# Patient Record
Sex: MALE | Race: BLACK OR AFRICAN AMERICAN | Employment: UNEMPLOYED
[De-identification: names, ages, dates, MRNs, and addresses within clinical notes are randomized per-mention and may not be internally consistent; named-entity substitution may affect disease eponyms.]

---

## 2017-01-04 ENCOUNTER — OFFICE VISIT (OUTPATIENT)
Dept: PODIATRY | Facility: CLINIC | Age: 67
End: 2017-01-04

## 2017-01-04 VITALS
SYSTOLIC BLOOD PRESSURE: 142 MMHG | TEMPERATURE: 97.6 F | WEIGHT: 201 LBS | HEART RATE: 65 BPM | DIASTOLIC BLOOD PRESSURE: 66 MMHG | BODY MASS INDEX: 28.14 KG/M2 | HEIGHT: 71 IN

## 2017-01-04 DIAGNOSIS — B35.1 ONYCHOMYCOSIS: ICD-10-CM

## 2017-01-04 DIAGNOSIS — M79.672 PAIN IN BOTH FEET: ICD-10-CM

## 2017-01-04 DIAGNOSIS — M79.671 PAIN IN BOTH FEET: ICD-10-CM

## 2017-01-04 DIAGNOSIS — L84 PRE-ULCERATIVE CORN OR CALLOUS: ICD-10-CM

## 2017-01-04 DIAGNOSIS — E11.40 TYPE 2 DIABETES MELLITUS WITH DIABETIC NEUROPATHY, WITHOUT LONG-TERM CURRENT USE OF INSULIN (HCC): Primary | ICD-10-CM

## 2017-01-04 DIAGNOSIS — Z87.2 HEALED ULCER OF RIGHT FOOT ON EXAMINATION: ICD-10-CM

## 2017-01-04 PROCEDURE — 11056 PARNG/CUTG B9 HYPRKR LES 2-4: CPT | Performed by: PODIATRIST

## 2017-01-04 PROCEDURE — 11721 DEBRIDE NAIL 6 OR MORE: CPT | Performed by: PODIATRIST

## 2017-05-24 ENCOUNTER — PROCEDURE VISIT (OUTPATIENT)
Dept: PODIATRY | Age: 67
End: 2017-05-24
Payer: MEDICARE

## 2017-05-24 VITALS
TEMPERATURE: 97.9 F | WEIGHT: 199 LBS | SYSTOLIC BLOOD PRESSURE: 127 MMHG | HEIGHT: 71 IN | DIASTOLIC BLOOD PRESSURE: 66 MMHG | BODY MASS INDEX: 27.86 KG/M2 | HEART RATE: 69 BPM

## 2017-05-24 DIAGNOSIS — B35.1 ONYCHOMYCOSIS: Primary | ICD-10-CM

## 2017-05-24 DIAGNOSIS — E11.40 TYPE 2 DIABETES MELLITUS WITH DIABETIC NEUROPATHY, WITHOUT LONG-TERM CURRENT USE OF INSULIN (HCC): ICD-10-CM

## 2017-05-24 DIAGNOSIS — I73.9 PAD (PERIPHERAL ARTERY DISEASE) (HCC): ICD-10-CM

## 2017-05-24 DIAGNOSIS — M79.671 PAIN IN BOTH FEET: ICD-10-CM

## 2017-05-24 DIAGNOSIS — M79.672 PAIN IN BOTH FEET: ICD-10-CM

## 2017-05-24 DIAGNOSIS — L84 PRE-ULCERATIVE CORN OR CALLOUS: ICD-10-CM

## 2017-05-24 PROCEDURE — 99213 OFFICE O/P EST LOW 20 MIN: CPT

## 2017-05-24 PROCEDURE — 11056 PARNG/CUTG B9 HYPRKR LES 2-4: CPT | Performed by: PODIATRIST

## 2017-05-24 PROCEDURE — 11721 DEBRIDE NAIL 6 OR MORE: CPT | Performed by: PODIATRIST

## 2017-05-24 PROCEDURE — G0463 HOSPITAL OUTPT CLINIC VISIT: HCPCS

## 2017-05-24 PROCEDURE — 99213 OFFICE O/P EST LOW 20 MIN: CPT | Performed by: PODIATRIST

## 2017-08-30 ENCOUNTER — OFFICE VISIT (OUTPATIENT)
Dept: PODIATRY | Age: 67
End: 2017-08-30
Payer: MEDICARE

## 2017-08-30 VITALS
DIASTOLIC BLOOD PRESSURE: 75 MMHG | WEIGHT: 199 LBS | HEART RATE: 71 BPM | SYSTOLIC BLOOD PRESSURE: 124 MMHG | TEMPERATURE: 97.5 F | HEIGHT: 71 IN | BODY MASS INDEX: 27.86 KG/M2

## 2017-08-30 DIAGNOSIS — L84 CALLUS: ICD-10-CM

## 2017-08-30 DIAGNOSIS — M79.671 PAIN IN BOTH FEET: ICD-10-CM

## 2017-08-30 DIAGNOSIS — B35.1 ONYCHOMYCOSIS: Primary | ICD-10-CM

## 2017-08-30 DIAGNOSIS — M79.672 PAIN IN BOTH FEET: ICD-10-CM

## 2017-08-30 DIAGNOSIS — E11.40 TYPE 2 DIABETES MELLITUS WITH DIABETIC NEUROPATHY, UNSPECIFIED LONG TERM INSULIN USE STATUS: ICD-10-CM

## 2017-08-30 PROCEDURE — 11056 PARNG/CUTG B9 HYPRKR LES 2-4: CPT | Performed by: PODIATRIST

## 2017-08-30 PROCEDURE — 4040F PNEUMOC VAC/ADMIN/RCVD: CPT | Performed by: PODIATRIST

## 2017-08-30 PROCEDURE — G8427 DOCREV CUR MEDS BY ELIG CLIN: HCPCS | Performed by: PODIATRIST

## 2017-08-30 PROCEDURE — 1123F ACP DISCUSS/DSCN MKR DOCD: CPT | Performed by: PODIATRIST

## 2017-08-30 PROCEDURE — 11721 DEBRIDE NAIL 6 OR MORE: CPT | Performed by: PODIATRIST

## 2017-08-30 PROCEDURE — 99213 OFFICE O/P EST LOW 20 MIN: CPT

## 2017-08-30 PROCEDURE — 3017F COLORECTAL CA SCREEN DOC REV: CPT | Performed by: PODIATRIST

## 2017-08-30 PROCEDURE — 1036F TOBACCO NON-USER: CPT | Performed by: PODIATRIST

## 2017-08-30 PROCEDURE — 3046F HEMOGLOBIN A1C LEVEL >9.0%: CPT | Performed by: PODIATRIST

## 2017-08-30 PROCEDURE — G8419 CALC BMI OUT NRM PARAM NOF/U: HCPCS | Performed by: PODIATRIST

## 2017-12-06 ENCOUNTER — OFFICE VISIT (OUTPATIENT)
Dept: PODIATRY | Age: 67
End: 2017-12-06
Payer: COMMERCIAL

## 2017-12-06 VITALS
SYSTOLIC BLOOD PRESSURE: 138 MMHG | DIASTOLIC BLOOD PRESSURE: 68 MMHG | BODY MASS INDEX: 27.58 KG/M2 | HEART RATE: 66 BPM | WEIGHT: 197 LBS | HEIGHT: 71 IN

## 2017-12-06 DIAGNOSIS — E11.40 TYPE 2 DIABETES MELLITUS WITH DIABETIC NEUROPATHY, UNSPECIFIED LONG TERM INSULIN USE STATUS: Primary | ICD-10-CM

## 2017-12-06 DIAGNOSIS — E11.21 CONTROLLED TYPE 2 DIABETES MELLITUS WITH DIABETIC NEPHROPATHY, WITHOUT LONG-TERM CURRENT USE OF INSULIN (HCC): ICD-10-CM

## 2017-12-06 DIAGNOSIS — I73.9 PAD (PERIPHERAL ARTERY DISEASE) (HCC): ICD-10-CM

## 2017-12-06 DIAGNOSIS — L97.512 TOE ULCER, RIGHT, WITH FAT LAYER EXPOSED (HCC): ICD-10-CM

## 2017-12-06 PROCEDURE — 99213 OFFICE O/P EST LOW 20 MIN: CPT | Performed by: PODIATRIST

## 2017-12-06 RX ORDER — DOXYCYCLINE HYCLATE 100 MG
100 TABLET ORAL 2 TIMES DAILY
Qty: 20 TABLET | Refills: 0 | Status: ON HOLD | OUTPATIENT
Start: 2017-12-06 | End: 2017-12-20 | Stop reason: HOSPADM

## 2017-12-06 RX ORDER — DOXYCYCLINE HYCLATE 100 MG
100 TABLET ORAL 2 TIMES DAILY
Qty: 20 TABLET | Refills: 0 | Status: SHIPPED | OUTPATIENT
Start: 2017-12-06 | End: 2017-12-06 | Stop reason: SDUPTHER

## 2017-12-06 NOTE — PROGRESS NOTES
Patient instructed to remove shoes and socks, instructed to sit in exam chair. Current PCP name is Krystal Cruz and date of last visit 9/2017. Do you have a follow up visit scheduled? Yes or no    If yes the date is 12/12/2017    Diabetic visit information    Blood pressure (Control is BP <140/90)  BP Readings from Last 3 Encounters:   08/30/17 124/75   05/24/17 127/66   01/04/17 (!) 142/66       BP taken with correct size cuff? - Yes   Repeated if > 140/90 Yes      Tobacco use:  Patient  reports that he has never smoked. He has never used smokeless tobacco.  If Smoker - Cessation materials given? - Yes       Diabetic Health Maintenance Items due  Diabetes Management   Topic Date Due    Diabetic retinal exam  07/16/1960    Diabetic hemoglobin A1C test  08/23/2013    Diabetic microalbuminuria test  12/15/2016    Lipid screen  12/15/2016       Diabetic retinal exam done in last year? - Yes   If No: remind patient that it is due and they should schedule an exam    Medications  Is patient taking any medications for diabetes? -   Yes  Have blood sugars been controlled? Fasting blood sugars under 120   -   Yes   Random home sugars or today's POCT glucose is under 180 -   No   []  If No to the above then patient should schedule appt with PCP.      Diabetic Plan    A1C Plan  Lab Results   Component Value Date    LABA1C 8.0 (H) 08/23/2012      []  If A1C over 8 and last result >3 months ago - Order A1C and refer to PCP   []  If last A1C over 6 months ago - Order A1C and refer to PCP for follow up   []  If elevated blood sugars > 180 - refer to PCP for follow up    []  Blood sugar controlled - A1C under 8 and last check was < 6 months      Cholesterol Plan   Lab Results   Component Value Date    LDLCHOLESTEROL 46 12/15/2015      []  If LDL > 100 and last result >3 months ago - order Fasting lipids and refer to PCP for follow up   []  If LDL < 100 and over 1 year ago - Order Fasting lipids and refer to PCP for follow up   [] LDL is controlled. LDL < 100 and checked within the last year     Blood Pressure  BP Readings from Last 3 Encounters:   08/30/17 124/75   05/24/17 127/66   01/04/17 (!) 142/66      []  If SBP >140 mmhg - refer to PCP for follow up   []  If DBP > 90 mmhg - refer to PCP for follow up   [] BP is controlled <140/90     Order labs as PCP ordered.   (ie: Lipids, A1C, CMP)

## 2017-12-06 NOTE — PROGRESS NOTES
Martha Campuzano is a 69 y.o. male presenting for diabetic foot exam. Pt states that he started a new job two weeks ago. He states that he noticed a few weeks ago that he got wounds on his toes on the right foot. He states that he has been having drainage in his shoes. Denies n/v/f/c. No other complaints at this time. Objective:   Vascular: DP and PT pulses non palpable bilaterally. Monophasic DP and PT on right. Biphasic DP and PT on left. Hair growth absent to the level of the digits, jeanne. CFT <5 seconds digits 1-5, jeanne. PVR/PPG 9/26/16: Left MEI: 1.1 Right MEI: 1.09    Neurological: Light touch sensation decreased to the digits, Bilateral.       Musculoskeletal: Muscle strength 5/5, Right. Prior partial amputation tuft of right distal phalanx of great toe. Hammer digit deformity of 2-5 bilaterally.       Integument:  Open wound noted on right 2nd and 3rd digits. The wounds probe to periosteum of digits. The wounds are covered with black necrotic/fibrotic tissue. No purulent drainage noted. There is mild diffuse edema of the right foot and ankle. Assessment:     1. Type 2 diabetes mellitus with diabetic neuropathy, unspecified long term insulin use status [facility])  [facility] Vascular Surgery, Silke Cali MD, [facility]    External Referral To Home Health   2. PAD (peripheral artery disease) [facility])  [facility] Vascular Surgery, Silke Cali MD, [facility]    doxycycline hyclate (VIBRA-TABS) 100 MG tablet    External Referral To Home Health    DISCONTINUED: doxycycline hyclate (VIBRA-TABS) 100 MG tablet   3. Controlled type 2 diabetes mellitus with diabetic nephropathy, without long-term current use of insulin [facility])  External Referral To Home Health   4.  Toe ulcer, right, with fat layer exposed ([address]  XR FOOT RIGHT (MIN 3 VIEWS)    [facility], Silke Cali MD, [facility]    doxycycline hyclate (VIBRA-TABS) 100 MG tablet    External Referral To Home Health    DISCONTINUED: doxycycline hyclate (VIBRA-TABS) 100 MG tablet     Plan:   · Patient seen and evaluated. · No debridement of wounds. · Instructed on the importance of good control of blood glucose levels for vascular, neurologic, and overall health. · Dressing of betadine and DSD applied. · surg shoe dispensed  · RXDoxy 10 days  · Urgent vasc surg referral and order for PVRs  · XR of right foot before next visit. · Home care ordered for betadine and DSD daily. · Discussed the severity of the situation and that he may loose these toes and may require amputations. · Patient mentions he has to go to work but he will try to wear the surgical shoe at home. · Offered a work note. He states he is going to work and doesn't need a work note. · RTC 1 week for follow up. Electronically signed by Clif Valerio DPM on 12/6/2017 at 2:55 PM  I performed a history and physical examination of the patient and discussed management with the resident. I reviewed the residents note and agree with the documented findings and plan of care. Any areas of disagreement are noted on the chart. I was personally present for the key portions of any procedures. I have documented in the chart those procedures where I was not present during the key portions. I have reviewed the Podiatry Resident progress note. I agree with the chief complaint, past medical history, past surgical history, allergies, medications, social and family history as documented unless otherwise noted below. Documentation of the HPI, Physical Exam and Medical Decision Making performed by medical students or scribes is based on my personal performance of the HPI, PE and MDM. I have personally evaluated this patient and have completed at least one if not all key elements of the E/M (history, physical exam, and MDM). Additional findings are as noted. Chaitanya Shakeel Morin D.P.M.

## 2017-12-11 ENCOUNTER — HOSPITAL ENCOUNTER (OUTPATIENT)
Dept: VASCULAR LAB | Age: 67
Discharge: HOME | DRG: 256 | End: 2017-12-11
Payer: MEDICARE

## 2017-12-11 ENCOUNTER — HOSPITAL ENCOUNTER (OUTPATIENT)
Dept: GENERAL RADIOLOGY | Age: 67
Discharge: HOME | DRG: 256 | End: 2017-12-11
Payer: MEDICARE

## 2017-12-11 ENCOUNTER — HOSPITAL ENCOUNTER (OUTPATIENT)
Age: 67
Discharge: HOME | End: 2017-12-11
Payer: COMMERCIAL

## 2017-12-11 DIAGNOSIS — L97.512 TOE ULCER, RIGHT, WITH FAT LAYER EXPOSED (HCC): ICD-10-CM

## 2017-12-11 DIAGNOSIS — L97.521 TOE ULCER, LEFT, LIMITED TO BREAKDOWN OF SKIN (HCC): ICD-10-CM

## 2017-12-11 DIAGNOSIS — E11.21 CONTROLLED TYPE 2 DIABETES MELLITUS WITH DIABETIC NEPHROPATHY, WITHOUT LONG-TERM CURRENT USE OF INSULIN (HCC): ICD-10-CM

## 2017-12-11 DIAGNOSIS — I73.9 PAD (PERIPHERAL ARTERY DISEASE) (HCC): ICD-10-CM

## 2017-12-11 DIAGNOSIS — E11.40 TYPE 2 DIABETES MELLITUS WITH DIABETIC NEUROPATHY, UNSPECIFIED LONG TERM INSULIN USE STATUS: ICD-10-CM

## 2017-12-11 DIAGNOSIS — E11.40 TYPE 2 DIABETES MELLITUS WITH DIABETIC NEUROPATHY, UNSPECIFIED LONG TERM INSULIN USE STATUS: Primary | ICD-10-CM

## 2017-12-11 PROCEDURE — 93923 UPR/LXTR ART STDY 3+ LVLS: CPT

## 2017-12-11 PROCEDURE — 73630 X-RAY EXAM OF FOOT: CPT

## 2017-12-12 ENCOUNTER — TELEPHONE (OUTPATIENT)
Dept: PODIATRY | Age: 67
End: 2017-12-12

## 2017-12-12 NOTE — TELEPHONE ENCOUNTER
Lolly Cardozo's called states that she needs more information regarding the pt's wounds and if they Diabetic . Please up date office note.

## 2017-12-13 ENCOUNTER — OFFICE VISIT (OUTPATIENT)
Dept: PODIATRY | Age: 67
End: 2017-12-13
Payer: COMMERCIAL

## 2017-12-13 ENCOUNTER — HOSPITAL ENCOUNTER (INPATIENT)
Age: 67
LOS: 8 days | Discharge: HOME HEALTH | DRG: 256 | End: 2017-12-21
Attending: EMERGENCY MEDICINE | Admitting: INTERNAL MEDICINE
Payer: MEDICARE

## 2017-12-13 VITALS
DIASTOLIC BLOOD PRESSURE: 82 MMHG | BODY MASS INDEX: 27.16 KG/M2 | SYSTOLIC BLOOD PRESSURE: 172 MMHG | HEIGHT: 71 IN | HEART RATE: 98 BPM | WEIGHT: 194 LBS | TEMPERATURE: 99.6 F

## 2017-12-13 DIAGNOSIS — E11.40 TYPE 2 DIABETES MELLITUS WITH DIABETIC NEUROPATHY, UNSPECIFIED LONG TERM INSULIN USE STATUS: Primary | ICD-10-CM

## 2017-12-13 DIAGNOSIS — I73.9 PAD (PERIPHERAL ARTERY DISEASE) (HCC): ICD-10-CM

## 2017-12-13 DIAGNOSIS — M86.9 OSTEOMYELITIS OF RIGHT FOOT, UNSPECIFIED TYPE (HCC): ICD-10-CM

## 2017-12-13 DIAGNOSIS — I96 WET GANGRENE (HCC): ICD-10-CM

## 2017-12-13 DIAGNOSIS — L97.512 TOE ULCER, RIGHT, WITH FAT LAYER EXPOSED (HCC): ICD-10-CM

## 2017-12-13 DIAGNOSIS — I96 GANGRENE OF RIGHT FOOT (HCC): Primary | ICD-10-CM

## 2017-12-13 PROBLEM — I10 HYPERTENSION: Status: ACTIVE | Noted: 2017-12-13

## 2017-12-13 PROBLEM — E11.52 DIABETIC GANGRENE (HCC): Status: ACTIVE | Noted: 2017-12-13

## 2017-12-13 PROBLEM — E11.59 TYPE 2 DIABETES MELLITUS WITH CIRCULATORY DISORDER (HCC): Status: ACTIVE | Noted: 2017-12-13

## 2017-12-13 PROBLEM — E87.1 HYPONATREMIA: Status: ACTIVE | Noted: 2017-12-13

## 2017-12-13 PROBLEM — D50.9 MICROCYTIC ANEMIA: Status: ACTIVE | Noted: 2017-12-13

## 2017-12-13 LAB
ABSOLUTE EOS #: <0.03 K/UL (ref 0–0.44)
ABSOLUTE IMMATURE GRANULOCYTE: 0.13 K/UL (ref 0–0.3)
ABSOLUTE LYMPH #: 1.2 K/UL (ref 1.1–3.7)
ABSOLUTE MONO #: 1.16 K/UL (ref 0.1–1.2)
ALBUMIN SERPL-MCNC: 3.7 G/DL (ref 3.5–5.2)
ALBUMIN/GLOBULIN RATIO: 0.8 (ref 1–2.5)
ALP BLD-CCNC: 81 U/L (ref 40–129)
ALT SERPL-CCNC: 38 U/L (ref 5–41)
ANION GAP SERPL CALCULATED.3IONS-SCNC: 15 MMOL/L (ref 9–17)
AST SERPL-CCNC: 29 U/L
BASOPHILS # BLD: 0 % (ref 0–2)
BASOPHILS ABSOLUTE: 0.03 K/UL (ref 0–0.2)
BILIRUB SERPL-MCNC: 0.43 MG/DL (ref 0.3–1.2)
BILIRUBIN DIRECT: 0.14 MG/DL
BILIRUBIN, INDIRECT: 0.29 MG/DL (ref 0–1)
BUN BLDV-MCNC: 16 MG/DL (ref 8–23)
BUN/CREAT BLD: ABNORMAL (ref 9–20)
C-REACTIVE PROTEIN: 169.2 MG/L (ref 0–5)
CALCIUM SERPL-MCNC: 8.9 MG/DL (ref 8.6–10.4)
CHLORIDE BLD-SCNC: 92 MMOL/L (ref 98–107)
CO2: 24 MMOL/L (ref 20–31)
CREAT SERPL-MCNC: 1.09 MG/DL (ref 0.7–1.2)
DIFFERENTIAL TYPE: ABNORMAL
EOSINOPHILS RELATIVE PERCENT: 0 % (ref 1–4)
FERRITIN: 121 UG/L (ref 30–400)
GFR AFRICAN AMERICAN: >60 ML/MIN
GFR NON-AFRICAN AMERICAN: >60 ML/MIN
GFR SERPL CREATININE-BSD FRML MDRD: ABNORMAL ML/MIN/{1.73_M2}
GFR SERPL CREATININE-BSD FRML MDRD: ABNORMAL ML/MIN/{1.73_M2}
GLOBULIN: ABNORMAL G/DL (ref 1.5–3.8)
GLUCOSE BLD-MCNC: 106 MG/DL (ref 75–110)
GLUCOSE BLD-MCNC: 63 MG/DL (ref 75–110)
GLUCOSE BLD-MCNC: 66 MG/DL (ref 70–99)
GLUCOSE BLD-MCNC: 98 MG/DL (ref 75–110)
HCT VFR BLD CALC: 36.8 % (ref 40.7–50.3)
HEMOGLOBIN: 11.6 G/DL (ref 13–17)
IMMATURE GRANULOCYTES: 1 %
IRON SATURATION: 6 % (ref 20–55)
IRON: 15 UG/DL (ref 59–158)
LACTIC ACID, WHOLE BLOOD: 2.5 MMOL/L (ref 0.7–2.1)
LACTIC ACID: ABNORMAL MMOL/L
LYMPHOCYTES # BLD: 7 % (ref 24–43)
MCH RBC QN AUTO: 24.6 PG (ref 25.2–33.5)
MCHC RBC AUTO-ENTMCNC: 31.5 G/DL (ref 28.4–34.8)
MCV RBC AUTO: 78.1 FL (ref 82.6–102.9)
MONOCYTES # BLD: 7 % (ref 3–12)
PDW BLD-RTO: 15 % (ref 11.8–14.4)
PLATELET # BLD: 518 K/UL (ref 138–453)
PLATELET ESTIMATE: ABNORMAL
PMV BLD AUTO: 8.7 FL (ref 8.1–13.5)
POTASSIUM SERPL-SCNC: 3.8 MMOL/L (ref 3.7–5.3)
RBC # BLD: 4.71 M/UL (ref 4.21–5.77)
RBC # BLD: ABNORMAL 10*6/UL
SEDIMENTATION RATE, ERYTHROCYTE: 44 MM (ref 0–10)
SEG NEUTROPHILS: 85 % (ref 36–65)
SEGMENTED NEUTROPHILS ABSOLUTE COUNT: 15.36 K/UL (ref 1.5–8.1)
SODIUM BLD-SCNC: 131 MMOL/L (ref 135–144)
TOTAL IRON BINDING CAPACITY: 250 UG/DL (ref 250–450)
TOTAL PROTEIN: 8.2 G/DL (ref 6.4–8.3)
TSH SERPL DL<=0.05 MIU/L-ACNC: 2.03 MIU/L (ref 0.3–5)
UNSATURATED IRON BINDING CAPACITY: 235 UG/DL (ref 112–347)
WBC # BLD: 17.9 K/UL (ref 3.5–11.3)
WBC # BLD: ABNORMAL 10*3/UL

## 2017-12-13 PROCEDURE — 2580000003 HC RX 258: Performed by: STUDENT IN AN ORGANIZED HEALTH CARE EDUCATION/TRAINING PROGRAM

## 2017-12-13 PROCEDURE — 83605 ASSAY OF LACTIC ACID: CPT

## 2017-12-13 PROCEDURE — 85651 RBC SED RATE NONAUTOMATED: CPT

## 2017-12-13 PROCEDURE — 83540 ASSAY OF IRON: CPT

## 2017-12-13 PROCEDURE — 80048 BASIC METABOLIC PNL TOTAL CA: CPT

## 2017-12-13 PROCEDURE — 85025 COMPLETE CBC W/AUTO DIFF WBC: CPT

## 2017-12-13 PROCEDURE — 84443 ASSAY THYROID STIM HORMONE: CPT

## 2017-12-13 PROCEDURE — 80076 HEPATIC FUNCTION PANEL: CPT

## 2017-12-13 PROCEDURE — 99213 OFFICE O/P EST LOW 20 MIN: CPT | Performed by: PODIATRIST

## 2017-12-13 PROCEDURE — 83550 IRON BINDING TEST: CPT

## 2017-12-13 PROCEDURE — 6370000000 HC RX 637 (ALT 250 FOR IP): Performed by: STUDENT IN AN ORGANIZED HEALTH CARE EDUCATION/TRAINING PROGRAM

## 2017-12-13 PROCEDURE — 82947 ASSAY GLUCOSE BLOOD QUANT: CPT

## 2017-12-13 PROCEDURE — 86140 C-REACTIVE PROTEIN: CPT

## 2017-12-13 PROCEDURE — 2580000003 HC RX 258: Performed by: EMERGENCY MEDICINE

## 2017-12-13 PROCEDURE — 82728 ASSAY OF FERRITIN: CPT

## 2017-12-13 PROCEDURE — 6360000002 HC RX W HCPCS: Performed by: STUDENT IN AN ORGANIZED HEALTH CARE EDUCATION/TRAINING PROGRAM

## 2017-12-13 PROCEDURE — 2060000000 HC ICU INTERMEDIATE R&B

## 2017-12-13 PROCEDURE — 6360000002 HC RX W HCPCS: Performed by: EMERGENCY MEDICINE

## 2017-12-13 PROCEDURE — 99284 EMERGENCY DEPT VISIT MOD MDM: CPT

## 2017-12-13 PROCEDURE — 83036 HEMOGLOBIN GLYCOSYLATED A1C: CPT

## 2017-12-13 PROCEDURE — 36415 COLL VENOUS BLD VENIPUNCTURE: CPT

## 2017-12-13 RX ORDER — INSULIN GLARGINE 100 [IU]/ML
10 INJECTION, SOLUTION SUBCUTANEOUS NIGHTLY
Status: DISCONTINUED | OUTPATIENT
Start: 2017-12-13 | End: 2017-12-13

## 2017-12-13 RX ORDER — HALOPERIDOL DECANOATE 100 MG/ML
25 INJECTION INTRAMUSCULAR
Status: DISCONTINUED | OUTPATIENT
Start: 2017-12-13 | End: 2017-12-13 | Stop reason: ALTCHOICE

## 2017-12-13 RX ORDER — SODIUM CHLORIDE 0.9 % (FLUSH) 0.9 %
10 SYRINGE (ML) INJECTION EVERY 12 HOURS SCHEDULED
Status: DISCONTINUED | OUTPATIENT
Start: 2017-12-13 | End: 2017-12-21 | Stop reason: HOSPADM

## 2017-12-13 RX ORDER — ATORVASTATIN CALCIUM 20 MG/1
20 TABLET, FILM COATED ORAL DAILY
Status: DISCONTINUED | OUTPATIENT
Start: 2017-12-13 | End: 2017-12-19

## 2017-12-13 RX ORDER — POTASSIUM CHLORIDE 20 MEQ/1
40 TABLET, EXTENDED RELEASE ORAL PRN
Status: DISCONTINUED | OUTPATIENT
Start: 2017-12-13 | End: 2017-12-16

## 2017-12-13 RX ORDER — POTASSIUM CHLORIDE 7.45 MG/ML
10 INJECTION INTRAVENOUS PRN
Status: DISCONTINUED | OUTPATIENT
Start: 2017-12-13 | End: 2017-12-16

## 2017-12-13 RX ORDER — SODIUM CHLORIDE 0.9 % (FLUSH) 0.9 %
10 SYRINGE (ML) INJECTION PRN
Status: CANCELLED | OUTPATIENT
Start: 2017-12-13

## 2017-12-13 RX ORDER — ACETAMINOPHEN 325 MG/1
650 TABLET ORAL EVERY 4 HOURS PRN
Status: CANCELLED | OUTPATIENT
Start: 2017-12-13

## 2017-12-13 RX ORDER — ONDANSETRON 4 MG/1
4 TABLET, FILM COATED ORAL EVERY 8 HOURS PRN
Status: DISCONTINUED | OUTPATIENT
Start: 2017-12-13 | End: 2017-12-21 | Stop reason: HOSPADM

## 2017-12-13 RX ORDER — ONDANSETRON 2 MG/ML
4 INJECTION INTRAMUSCULAR; INTRAVENOUS EVERY 6 HOURS PRN
Status: DISCONTINUED | OUTPATIENT
Start: 2017-12-13 | End: 2017-12-13

## 2017-12-13 RX ORDER — DEXTROSE MONOHYDRATE 25 G/50ML
12.5 INJECTION, SOLUTION INTRAVENOUS PRN
Status: DISCONTINUED | OUTPATIENT
Start: 2017-12-13 | End: 2017-12-21 | Stop reason: HOSPADM

## 2017-12-13 RX ORDER — ACETAMINOPHEN 650 MG
TABLET, EXTENDED RELEASE ORAL PRN
Status: DISCONTINUED | OUTPATIENT
Start: 2017-12-13 | End: 2017-12-21 | Stop reason: HOSPADM

## 2017-12-13 RX ORDER — DEXTROSE MONOHYDRATE 50 MG/ML
100 INJECTION, SOLUTION INTRAVENOUS PRN
Status: DISCONTINUED | OUTPATIENT
Start: 2017-12-13 | End: 2017-12-21 | Stop reason: HOSPADM

## 2017-12-13 RX ORDER — POTASSIUM CHLORIDE 20MEQ/15ML
40 LIQUID (ML) ORAL PRN
Status: DISCONTINUED | OUTPATIENT
Start: 2017-12-13 | End: 2017-12-16

## 2017-12-13 RX ORDER — SODIUM CHLORIDE 0.9 % (FLUSH) 0.9 %
10 SYRINGE (ML) INJECTION PRN
Status: DISCONTINUED | OUTPATIENT
Start: 2017-12-13 | End: 2017-12-21 | Stop reason: HOSPADM

## 2017-12-13 RX ORDER — ACETAMINOPHEN 325 MG/1
650 TABLET ORAL EVERY 4 HOURS PRN
Status: DISCONTINUED | OUTPATIENT
Start: 2017-12-13 | End: 2017-12-21 | Stop reason: HOSPADM

## 2017-12-13 RX ORDER — AMMONIUM LACTATE 12 G/100G
CREAM TOPICAL PRN
Status: DISCONTINUED | OUTPATIENT
Start: 2017-12-13 | End: 2017-12-21 | Stop reason: HOSPADM

## 2017-12-13 RX ORDER — NICOTINE POLACRILEX 4 MG
15 LOZENGE BUCCAL PRN
Status: DISCONTINUED | OUTPATIENT
Start: 2017-12-13 | End: 2017-12-21 | Stop reason: HOSPADM

## 2017-12-13 RX ORDER — ASPIRIN 81 MG/1
81 TABLET ORAL DAILY
Status: DISCONTINUED | OUTPATIENT
Start: 2017-12-13 | End: 2017-12-21 | Stop reason: HOSPADM

## 2017-12-13 RX ORDER — SODIUM CHLORIDE 9 MG/ML
INJECTION, SOLUTION INTRAVENOUS CONTINUOUS
Status: DISCONTINUED | OUTPATIENT
Start: 2017-12-13 | End: 2017-12-19

## 2017-12-13 RX ORDER — DOCUSATE SODIUM 100 MG/1
100 CAPSULE, LIQUID FILLED ORAL 2 TIMES DAILY PRN
Status: DISCONTINUED | OUTPATIENT
Start: 2017-12-13 | End: 2017-12-21 | Stop reason: HOSPADM

## 2017-12-13 RX ORDER — SODIUM CHLORIDE 0.9 % (FLUSH) 0.9 %
10 SYRINGE (ML) INJECTION EVERY 12 HOURS SCHEDULED
Status: CANCELLED | OUTPATIENT
Start: 2017-12-13

## 2017-12-13 RX ADMIN — DEXTROSE 15 G: 15 GEL ORAL at 21:13

## 2017-12-13 RX ADMIN — ASPIRIN 81 MG: 81 TABLET, COATED ORAL at 21:01

## 2017-12-13 RX ADMIN — TAZOBACTAM SODIUM AND PIPERACILLIN SODIUM 3.38 G: 375; 3 INJECTION, SOLUTION INTRAVENOUS at 21:01

## 2017-12-13 RX ADMIN — SODIUM CHLORIDE: 9 INJECTION, SOLUTION INTRAVENOUS at 21:01

## 2017-12-13 RX ADMIN — VANCOMYCIN HYDROCHLORIDE 1250 MG: 1 INJECTION, POWDER, LYOPHILIZED, FOR SOLUTION INTRAVENOUS at 16:04

## 2017-12-13 RX ADMIN — TAZOBACTAM SODIUM AND PIPERACILLIN SODIUM 3.38 G: 375; 3 INJECTION, SOLUTION INTRAVENOUS at 15:31

## 2017-12-13 RX ADMIN — ATORVASTATIN CALCIUM 20 MG: 20 TABLET, FILM COATED ORAL at 21:01

## 2017-12-13 ASSESSMENT — ENCOUNTER SYMPTOMS
CONSTIPATION: 0
DIARRHEA: 0
VOMITING: 0
EYE PAIN: 0
ABDOMINAL PAIN: 0
RHINORRHEA: 0
SHORTNESS OF BREATH: 0
COUGH: 0
NAUSEA: 0

## 2017-12-13 ASSESSMENT — PAIN SCALES - GENERAL: PAINLEVEL_OUTOF10: 8

## 2017-12-13 NOTE — ED NOTES
Pt resting on cot respirations even and unlabored, vanco infusing, waiting for bed placement on the floor, will continue to monitor     Daphney Zurita RN  12/13/17 0308

## 2017-12-13 NOTE — H&P
[facility]     Department of Internal Medicine - Staff Internal Medicine Service          ADMISSION NOTE/HISTORY AND PHYSICAL EXAMINATION       CHIEF COMPLAINT:    Chief Complaint   Patient presents with    Foot Pain     Pt to ED with c/o right foot wound, pt home nurse came by yesterday and changed dressing, today pt was seen in podiatry clinic and told to go to ED for gangrene    Wound Check       HISTORY OBTAIN FROM:  Patient     HISTORY OF PRESENT ILLNESS:      The patient is a pleasant 69 y.o. male with significant past medical history of DM2, HTN presented with gangrenous digits of the right foot and ischemic changes to digit on the left foot. Patient states he was sent to the ED after visit in podiatry clinic for worsening of wounds on his right foot. Patient was placed on doxycycline last week per podiatry. Patient was having wounds dressed per home care. Patient denies any nausea, vomiting, fever, chills, shortness of breath, or chest pain. Medical History:   Past Medical History:   Diagnosis Date    Hypertension     MRSA (methicillin resistant staph aureus) culture positive 07/01/2016    foot    Type II or unspecified type diabetes mellitus without mention of complication, not stated as uncontrolled        SURGICAL HISTORY:  History reviewed. No pertinent surgical history. MEDS:  Prior to Admission medications    Medication Sig Start Date End Date Taking? Authorizing Provider   doxycycline hyclate (VIBRA-TABS) 100 MG tablet Take 1 tablet by mouth 2 times daily for 10 days 12/6/17 12/16/17  Mark Dickerson DPM   ammonium lactate (AMLACTIN) 12 % cream APPLY TOPICALLY TO THE AFFECTED AREA AS NEEDED 11/21/16   Michael Jones DPM   urea (CARMOL) 10 % cream Apply topically as needed. 8/17/16   Diego Clark DPM   Ciclopirox (LOPROX) 0.77 % gel Apply to feet daily.  8/17/16   Diego Clark DPM   Gauze Pads & Dressings (KERLIX GAUZE ROLL LARGE) MIS 1 each by file     Social History Narrative    No narrative on file       FAMILY HISTORY:   Family History   Problem Relation Age of Onset    Arthritis Mother     Diabetes Father        REVIEW OF SYSTEMS:  Constitutional: Negative for Fever, chills  Eyes: Negative for visual changes, diplopia  ENT: Negative for mouth sores, sore throat. Cardiovascular: Negative for lightheadedness ,chest pain, palpitations   Respiratory:Negative for Shortness of breath,cough or wheezing. Gastrointestinal: Negative for nausea/vomiting, change in bowel habits, abdominal pain  Genitourinary:Negative for change in bladder habits, dysuria, hematuria. Musculoskeletal: Negative for joint pain   Neurological: Negative for headache, change in muscle strength numbness/tingling  Psychiatric: negative for change in mood, affect  Skin: Positive for wounds right foot  Hematology oncology complete and negative    PHYSICAL EXAM:  BP (!) 172/77   Pulse 99   Temp 97.9 °F (36.6 °C) (Oral)   Resp 16   Ht 5' 10.87\" (1.8 m)   Wt 195 lb (88.5 kg)   SpO2 97%   BMI 27.30 kg/m²    Wt Readings from Last 3 Encounters:   12/13/17 195 lb (88.5 kg)   12/13/17 194 lb (88 kg)   12/06/17 197 lb (89.4 kg)     General appearance: awake, alert, cooperative  HEENT: Head: Normocephalic, no lesions, without obvious abnormality. Lungs: clear to auscultation bilaterally  Heart: regular rate and rhythm, S1, S2 normal, audible systolic mumor  Abdomen: soft, non-tender; bowel sounds normal; no masses,  no organomegaly  Extremities: Gangrenous digits right digits 2 and 3, left digit 2. Pedal pulses non-palpable   Neurological:  Awake, alert, oriented to name, place and time. Cranial nerves II-XII are grossly intact. Sensation diminished BLE.   Eye no icterus no redness  Psych-normal affect   Skin no rash  Muscular skeletal no synovitis        LABS:  CBC:   Recent Labs      12/13/17   1457   WBC  17.9*   RBC  4.71   HGB  11.6*   HCT  36.8*   MCV  78.1*   RDW  15.0*   PLT and examined the patient and the key elements of all parts of the encounter have been performed by me. I agree with the assessment, plan and orders as documented by the resident with additions . Treatment plan Discussed with nursing staff in detail , all questions answered . Ned Zhu MD   12/14/2017   1:38 PM    Please note that this chart was generated using voice recognition Dragon dictation software. Although every effort was made to ensure the accuracy of this automated transcription, some errors in transcription may have occurred.

## 2017-12-13 NOTE — ED NOTES
Pt given blanket, watching tv, zosyn infusing, denies needs at this time     Daphney Zurita RN  12/13/17 5970

## 2017-12-13 NOTE — CONSULTS
Outpatient Prescriptions:     doxycycline hyclate (VIBRA-TABS) 100 MG tablet, Take 1 tablet by mouth 2 times daily for 10 days, Disp: 20 tablet, Rfl: 0    ammonium lactate (AMLACTIN) 12 % cream, APPLY TOPICALLY TO THE AFFECTED AREA AS NEEDED, Disp: 140 g, Rfl: 0    urea (CARMOL) 10 % cream, Apply topically as needed. , Disp: 1 Tube, Rfl: 3    Ciclopirox (LOPROX) 0.77 % gel, Apply to feet daily. , Disp: 1 Tube, Rfl: 3    Gauze Pads & Dressings (KERLIX GAUZE ROLL LARGE) MISC, 1 each by Does not apply route daily for 7 days, Disp: 7 each, Rfl: 0    Gauze Pads & Dressings (GAUZE DRESSING) 4\"X4\" PADS, 1 each by Does not apply route daily, Disp: 10 each, Rfl: 0    povidone-iodine (BETADINE) 10 % external solution, Apply topically as needed. , Disp: 1 Bottle, Rfl: 0    Offloading Shoe MISC, by Does not apply route Please dispense 1 surgical offloading shoe Right foot, Disp: 1 each, Rfl: 0    ondansetron (ZOFRAN) 4 MG tablet, Take 4 mg by mouth every 8 hours as needed for Nausea or Vomiting, Disp: , Rfl:     haloperidol decanoate (HALDOL DECANOATE) 100 MG/ML injection, , Disp: , Rfl: 0    aspirin EC 81 MG EC tablet, , Disp: , Rfl:     LIPITOR 20 MG tablet, , Disp: , Rfl:     diltiazem (CARDIZEM CD) 300 MG ER capsule, , Disp: , Rfl:     docusate sodium (COLACE) 100 MG capsule, , Disp: , Rfl:     glimepiride (AMARYL) 4 MG tablet, , Disp: , Rfl:     glipiZIDE (GLUCOTROL) 10 MG tablet, , Disp: , Rfl:     TRUETRACK TEST strip, , Disp: , Rfl:     lisinopril-hydrochlorothiazide (PRINZIDE;ZESTORETIC) 20-12.5 MG per tablet, , Disp: , Rfl:     metformin (GLUCOPHAGE) 1000 MG tablet, , Disp: , Rfl:     REVIEW OF SYSTEMS:      General: Energy level normal for pt. HEENT: Denies sore throat  Cardiovascular: Denies chest pain  Pulmonary: Denies cough  GI:  Denies abdominal pain  : Denies polyuria, dysuria, hematuria  Endocrine: Reports diabetes   Hem/Onc: Denies anemia, h/o bleeding or clotting problems.    Neurological: Denies h/o CVA, TIA  Skin: Reports wounds to the right foot that have not healed well  Musculoskeletal: Denies muscle, joint, back pain      PHYSICAL EXAM:    VITALS:  BP (!) 172/77   Pulse 99   Temp 97.9 °F (36.6 °C) (Oral)   Resp 16   Ht 5' 10.87\" (1.8 m)   Wt 195 lb (88.5 kg)   SpO2 97%   BMI 27.30 kg/m²   INTAKE/OUTPUT:   No intake or output data in the 24 hours ending 12/13/17 1834      CONSTITUTIONAL:  Alert and oriented, no acute distress  HEAD: normocephalic, atraumatic  EYES: sclera non icteric  ENT: Mucus membranes moist  NECK:  supple, symmetrical, trachea midline   LUNGS:  Good air movement bilaterally  CARDIOVASCULAR: Regular rate and rhythm  ABDOMEN: soft, non tender, non distended  MUSCULOSKELETAL:  normal muscle tone  SKIN: dry gangrenous 2nd and 3rd digits of the foot without any discharge or abscess noted, surrounding cellulitis of the forefoot and ankle noted that is slightly tender to palpation and warm to the touch, no abscess noted on exam  Extremities: palpable bilateral femoral pulses, doppler signals to the popliteal arteries bilaterally and PT/DP bilaterally but no palpable popliteal, DP or PT bilaterally. NEUROLOGIC:no focal deficits  PSYCH: affect appropriate      Labs:   Lab Results   Component Value Date    WBC 17.9 12/13/2017    HGB 11.6 12/13/2017    HCT 36.8 12/13/2017    MCV 78.1 12/13/2017     12/13/2017     Lab Results   Component Value Date     12/13/2017    K 3.8 12/13/2017    CL 92 12/13/2017    CO2 24 12/13/2017    BUN 16 12/13/2017    CREATININE 1.09 12/13/2017    GLUCOSE 66 12/13/2017    GLUCOSE 132 12/29/2011    CALCIUM 8.9 12/13/2017           Impression:  Patient Active Problem List   Diagnosis    Diabetes mellitus ([facility]    High blood pressure    Onychomycosis    Diabetes ([facility]    Gangrene ([facility]       1.  Chronic peripheral vascular disease as evident with abnormal PVR's and non-palpable pulses in the bilateral lower extremities with non-healing

## 2017-12-13 NOTE — TELEPHONE ENCOUNTER
Tried calling. No answer. In the referral order it mentions the wound care instructions. I believe it was betadine to wounds with dry sterile dressing.

## 2017-12-13 NOTE — ED PROVIDER NOTES
[facility]  ED  Emergency Department Encounter  Emergency Medicine Resident     Pt Name: Irma Solis  MRN: [id number]  [place] 1950  Date of evaluation: 12/13/17  PCP:  Christoper Scheuermann, MD    [address] [place] [address]       Chief Complaint   Patient presents with    Foot Pain     Pt to ED with c/o right foot wound, pt home nurse came by yesterday and changed dressing, today pt was seen in podiatry clinic and told to go to ED for gangrene    Wound Check       HISTORY OF PRESENT ILLNESS  (Location/Symptom, Timing/Onset, Context/Setting, Quality, Duration, Modifying Factors, Severity.)      Irma Solis is a 69 y.o. male who presents with Wound to second and third toe. Patient states he saw his podiatrist today in the office and was sent to the Emergency Department due to concern of possible gangrene of the second and third toe on his right foot. Patient states he has a history of diabetic ulcers of the right foot and has been treated for wounds to the right 2nd and third toe. Patient states the wounds have been ongoing for the past week and he has a home health nurse that has changed the dressings. Patient denies any fevers, chest pain, shortness breath, nausea, or vomiting. Patient does have neuropathy of the right foot and denies any worsening of the numbness. Patient has been able to ambulate. PAST MEDICAL / SURGICAL / SOCIAL / FAMILY HISTORY      has a past medical history of Hypertension; MRSA (methicillin resistant staph aureus) culture positive; and Type II or unspecified type diabetes mellitus without mention of complication, not stated as uncontrolled. has no past surgical history on file. Social History     Social History    Marital status: Single     Spouse name: N/A    Number of children: N/A    Years of education: N/A     Occupational History    Not on file.      Social History Main Topics    Smoking status: Never Smoker    Smokeless tobacco: Never Used   [employer] Provider, MD   glipiZIDE (GLUCOTROL) 10 MG tablet  11/4/11   Historical Provider, MD Bharath Pimentel [address] TEST strip  11/4/11   Historical Provider, MD   lisinopril-hydrochlorothiazide (PRINZIDE;ZESTORETIC) 20-12.5 MG per tablet  9/6/11   Historical Provider, MD   metformin (GLUCOPHAGE) 1000 MG tablet  11/4/11   Historical Provider, MD       REVIEW OF SYSTEMS    (2-9 systems for level 4, 10 or more for level 5)      Review of Systems   Constitutional: Negative for chills, fatigue and fever. HENT: Negative for congestion and rhinorrhea. Eyes: Negative for pain and visual disturbance. Respiratory: Negative for cough and shortness of breath. Cardiovascular: Negative for chest pain and palpitations. Gastrointestinal: Negative for abdominal pain, constipation, diarrhea, nausea and vomiting. Genitourinary: Negative for difficulty urinating and dysuria. Musculoskeletal: Negative for gait problem and neck pain. Skin: Positive for wound. Negative for rash. Right foot toe wounds   Neurological: Negative for weakness and numbness. PHYSICAL EXAM   (up to 7 for level 4, 8 or more for level 5)      INITIAL VITALS:   BP (!) 172/77   Pulse 99   Temp 97.9 °F (36.6 °C) (Oral)   Resp 16   Ht 5' 10.87\" (1.8 m)   Wt 195 lb (88.5 kg)   SpO2 97%   BMI 27.30 kg/m²     Physical Exam   Constitutional: He is oriented to person, place, and time. He appears well-developed and well-nourished. HENT:   Head: Normocephalic and atraumatic. Mouth/Throat: Oropharynx is clear and moist.   Eyes: Conjunctivae and EOM are normal. Pupils are equal, round, and reactive to light. Cardiovascular: Normal rate, regular rhythm, normal heart sounds and intact distal pulses. Exam reveals no gallop and no friction rub. No murmur heard. Pulses:       Dorsalis pedis pulses are 2+ on the right side, and 2+ on the left side. Posterior tibial pulses are 2+ on the right side, and 2+ on the left side.    Pulmonary/Chest: has been paged for admission. 5:28 PM: Spoke to internal medicine resident. Patient comes in for admission. We'll place bridging orders under Dr. Shruthi Jose. 5:35 PM: Vascular surgery resident in ED to assess patient. 5:50 PM: Internal medicine resident in ED to assess patient. PROCEDURES:  None    CONSULTS:  IP CONSULT TO PODIATRY  IP CONSULT TO VASCULAR SURGERY  PHARMACY TO DOSE VANCOMYCIN  IP CONSULT TO PRIMARY CARE PROVIDER  IP CONSULT TO PODIATRY  IP CONSULT TO PRIMARY CARE PROVIDER  IP CONSULT TO INTERNAL MEDICINE    CRITICAL CARE:  None    FINAL IMPRESSION      1. Gangrene of right foot ([address]    2. Osteomyelitis of right foot, unspecified type ([address]          DISPOSITION / PLAN     DISPOSITION Decision to Admit  Patient signed out to Dr. Adriana Key at 6:00 PM    PATIENT REFERRED TO:  No follow-up provider specified.     DISCHARGE MEDICATIONS:  New Prescriptions    No medications on file       Vadim Postin,   Emergency Medicine Resident    (Please note that portions of this note were completed with a voice recognition program.  Efforts were made to edit the dictations but occasionally words are mis-transcribed.)      Vadim Diop DO  12/13/17 [id number]

## 2017-12-13 NOTE — ED NOTES
Pt asking to eat, informed cannot eat at this time, waiting for lab results     Arsh Hudson, RN  12/13/17 4114

## 2017-12-13 NOTE — PROGRESS NOTES
Patient instructed to remove shoes and socks, instructed to sit in exam chair. Current PCP name is Dagmar Justice MD  and date of last visit 12/13/2017. Do you have a follow up visit scheduled?   Yes or no    If yes the date is 1/12/2018
applied. · Reviewed imaging and vascular studies. · Discussed the severity of the situation and that he may loose these toes and may require amputations. · Sent directly to the ER in a wheelchair by our clinic staff. Patient needs evaluation and likely admission. Recommend medical admission for IV antibiotics and STAT vascular consult. · Patient is open to going to the ER and staying in the hospital as he does not want to lose his foot. · RTC 1 week for follow up. Electronically signed by Skyler Styles DPM on 12/13/2017 at 1:46 PM    I performed a history and physical examination of the patient and discussed management with the resident. I reviewed the residents note and agree with the documented findings and plan of care. Any areas of disagreement are noted on the chart. I was personally present for the key portions of any procedures. I have documented in the chart those procedures where I was not present during the key portions. I have reviewed the Podiatry Resident progress note. I agree with the chief complaint, past medical history, past surgical history, allergies, medications, social and family history as documented unless otherwise noted below. Documentation of the HPI, Physical Exam and Medical Decision Making performed by medical students or scribes is based on my personal performance of the HPI, PE and MDM. I have personally evaluated this patient and have completed at least one if not all key elements of the E/M (history, physical exam, and MDM). Additional findings are as noted. Ranker, Chase,D.P.M.

## 2017-12-13 NOTE — ED PROVIDER NOTES
FACULTY SIGN-OUT  ADDENDUM       Patient: Chad Posadas   MRN: [id number]  PCP:  Ayo Narvaez MD  The patient's initial evaluation and plan have been discussed with the prior provider who initially evaluated the patient. Nursing Notes, Past Medical Hx, Past Surgical Hx, Social Hx, Allergies, and Family Hx were all reviewed. Pertinent Comments: The patient is a 69 y.o. male taken in signout with Sent from podiatry clinic with osteomyelitis however they also feel there may be a significant vascular component to this.     They're asking for admission as well as vascular consultation  We are awaiting admission    ED COURSE      The patient was given the following medications:  Orders Placed This Encounter   Medications    piperacillin-tazobactam (ZOSYN) 3.375 g in dextrose 50 mL IVPB (premix)    vancomycin (VANCOCIN) 1,250 mg in dextrose 5 % 250 mL IVPB       RECENT VITALS:   BP: (!) 172/77  Pulse: 99  Resp: 16  Temp: 97.9 °F (36.6 °C) SpO2: 97 %    (Please note that portions of this note were completed with a voice recognition program.  Efforts were made to edit the dictations but occasionally words are mis-transcribed.)    MD Michael Verdugo  Attending Emergency Medicine Physician       Marley Colunga MD  12/13/17 9337

## 2017-12-13 NOTE — ED PROVIDER NOTES
[facility]  ED  Emergency Department  Emergency Medicine Resident Sign-out     Care of Tania Narvaez was assumed from Dr. Jerson Johnston and is being seen for Foot Pain (Pt to ED with c/o right foot wound, pt home nurse came by yesterday and changed dressing, today pt was seen in podiatry clinic and told to go to ED for gangrene) and Wound Check  . The patient's initial evaluation and plan have been discussed with the prior provider who initially evaluated the patient. EMERGENCY DEPARTMENT COURSE / MEDICAL DECISION MAKING:       MEDICATIONS GIVEN:  Orders Placed This Encounter   Medications    piperacillin-tazobactam (ZOSYN) 3.375 g in dextrose 50 mL IVPB (premix)    vancomycin (VANCOCIN) 1,250 mg in dextrose 5 % 250 mL IVPB    vancomycin (VANCOCIN) intermittent dosing (placeholder)    vancomycin (VANCOCIN) 1,500 mg in dextrose 5 % 250 mL IVPB       LABS / RADIOLOGY:     Labs Reviewed   CBC WITH AUTO DIFFERENTIAL - Abnormal; Notable for the following:        Result Value    WBC 17.9 (*)     Hemoglobin 11.6 (*)     Hematocrit 36.8 (*)     MCV 78.1 (*)     MCH 24.6 (*)     RDW 15.0 (*)     Platelets 481 (*)     Seg Neutrophils 85 (*)     Lymphocytes 7 (*)     Eosinophils % 0 (*)     Immature Granulocytes 1 (*)     Segs Absolute 15.36 (*)     All other components within normal limits   BASIC METABOLIC PANEL - Abnormal; Notable for the following:     Glucose 66 (*)     Sodium 131 (*)     Chloride 92 (*)     All other components within normal limits   SEDIMENTATION RATE - Abnormal; Notable for the following:     Sed Rate 44 (*)     All other components within normal limits   C-REACTIVE PROTEIN - Abnormal; Notable for the following:     .2 (*)     All other components within normal limits   LACTIC ACID, PLASMA       Xr Foot Right (min 3 Views)    Result Date: 12/11/2017  EXAMINATION: 3 VIEWS OF THE RIGHT FOOT 12/11/2017 10:33 am COMPARISON: None.  HISTORY: ORDERING SYSTEM PROVIDED HISTORY: Toe ulcer, right, with fat layer exposed [facility]) TECHNOLOGIST PROVIDED HISTORY: Reason for exam:->wounds to digits 2 and 3 FINDINGS: Anatomic alignment. Partial destruction of the 1st distal phalanx is stable. New bony destruction of the 2nd and 3rd distal phalanges. No fracture. No dislocation. 1. New bony destruction involving the 2nd and 3rd distal phalanges suggesting osteomyelitis 2. Stable partial destruction of the 1st distal phalanx The findings were sent to the Radiology Results [address] at 12:03 pm on 12/11/2017to be communicated to a licensed caregiver. RECENT VITALS:     Temp: 97.9 °F (36.6 °C),  Pulse: 99, Resp: 16, BP: (!) 172/77, SpO2: 97 %    This patient is a 69 y.o. Male with R foot wound, here per podiatry recs ? wet gangrene. Vascular surgery consult. IM admit. Vancomycin infusing. OUTSTANDING TASKS / RECOMMENDATIONS:    1. Awaiting bed on floor     FINAL IMPRESSION:     1. Gangrene of right foot ([address]    2.  Osteomyelitis of right foot, unspecified type [facility])        DISPOSITION:         DISPOSITION:  []  Discharge   []  Transfer -    [x]  Admission    []  Against Medical Advice   []  Eloped   FOLLOW-UP: Kit Ramirez MD  [address] [place] [id number] [facility], [address] [place] [id number] [phone]           DISCHARGE MEDICATIONS: New Prescriptions    No medications on file          Madilyn Holter, MD  Emergency Medicine Resident  [facility]       Madilyn Holter, MD  Resident  12/13/17 0951

## 2017-12-14 ENCOUNTER — APPOINTMENT (OUTPATIENT)
Dept: CARDIAC CATH/INVASIVE PROCEDURES | Age: 67
DRG: 256 | End: 2017-12-14
Payer: MEDICARE

## 2017-12-14 LAB
ABSOLUTE EOS #: 0.08 K/UL (ref 0–0.44)
ABSOLUTE IMMATURE GRANULOCYTE: 0.08 K/UL (ref 0–0.3)
ABSOLUTE LYMPH #: 1.11 K/UL (ref 1.1–3.7)
ABSOLUTE MONO #: 1.01 K/UL (ref 0.1–1.2)
ANION GAP SERPL CALCULATED.3IONS-SCNC: 14 MMOL/L (ref 9–17)
BASOPHILS # BLD: 0 % (ref 0–2)
BASOPHILS ABSOLUTE: <0.03 K/UL (ref 0–0.2)
BUN BLDV-MCNC: 17 MG/DL (ref 8–23)
BUN/CREAT BLD: ABNORMAL (ref 9–20)
C-REACTIVE PROTEIN: 158.9 MG/L (ref 0–5)
CALCIUM SERPL-MCNC: 8 MG/DL (ref 8.6–10.4)
CHLORIDE BLD-SCNC: 97 MMOL/L (ref 98–107)
CHOLESTEROL, FASTING: 86 MG/DL
CHOLESTEROL/HDL RATIO: 1.5
CO2: 23 MMOL/L (ref 20–31)
CREAT SERPL-MCNC: 1.18 MG/DL (ref 0.7–1.2)
DIFFERENTIAL TYPE: ABNORMAL
EOSINOPHILS RELATIVE PERCENT: 1 % (ref 1–4)
ESTIMATED AVERAGE GLUCOSE: 189 MG/DL
GFR AFRICAN AMERICAN: >60 ML/MIN
GFR NON-AFRICAN AMERICAN: >60 ML/MIN
GFR SERPL CREATININE-BSD FRML MDRD: ABNORMAL ML/MIN/{1.73_M2}
GFR SERPL CREATININE-BSD FRML MDRD: ABNORMAL ML/MIN/{1.73_M2}
GLUCOSE BLD-MCNC: 114 MG/DL (ref 75–110)
GLUCOSE BLD-MCNC: 130 MG/DL (ref 70–99)
GLUCOSE BLD-MCNC: 179 MG/DL (ref 75–110)
GLUCOSE BLD-MCNC: 48 MG/DL (ref 75–110)
GLUCOSE BLD-MCNC: 52 MG/DL (ref 75–110)
GLUCOSE BLD-MCNC: 57 MG/DL (ref 75–110)
GLUCOSE BLD-MCNC: 73 MG/DL (ref 75–110)
GLUCOSE BLD-MCNC: 75 MG/DL (ref 75–110)
GLUCOSE BLD-MCNC: 75 MG/DL (ref 75–110)
GLUCOSE BLD-MCNC: 84 MG/DL (ref 75–110)
HBA1C MFR BLD: 8.2 % (ref 4–6)
HCT VFR BLD CALC: 31.3 % (ref 40.7–50.3)
HDLC SERPL-MCNC: 58 MG/DL
HEMOGLOBIN: 9.9 G/DL (ref 13–17)
IMMATURE GRANULOCYTES: 1 %
LDL CHOLESTEROL: 22 MG/DL (ref 0–130)
LYMPHOCYTES # BLD: 8 % (ref 24–43)
MCH RBC QN AUTO: 24.7 PG (ref 25.2–33.5)
MCHC RBC AUTO-ENTMCNC: 31.6 G/DL (ref 28.4–34.8)
MCV RBC AUTO: 78.1 FL (ref 82.6–102.9)
MONOCYTES # BLD: 7 % (ref 3–12)
PDW BLD-RTO: 15 % (ref 11.8–14.4)
PLATELET # BLD: 410 K/UL (ref 138–453)
PLATELET ESTIMATE: ABNORMAL
PMV BLD AUTO: 9.2 FL (ref 8.1–13.5)
POTASSIUM SERPL-SCNC: 4 MMOL/L (ref 3.7–5.3)
RBC # BLD: 4.01 M/UL (ref 4.21–5.77)
RBC # BLD: ABNORMAL 10*6/UL
SEG NEUTROPHILS: 83 % (ref 36–65)
SEGMENTED NEUTROPHILS ABSOLUTE COUNT: 11.29 K/UL (ref 1.5–8.1)
SODIUM BLD-SCNC: 134 MMOL/L (ref 135–144)
TRIGLYCERIDE, FASTING: 30 MG/DL
VLDLC SERPL CALC-MCNC: NORMAL MG/DL (ref 1–30)
WBC # BLD: 13.6 K/UL (ref 3.5–11.3)
WBC # BLD: ABNORMAL 10*3/UL

## 2017-12-14 PROCEDURE — 36415 COLL VENOUS BLD VENIPUNCTURE: CPT

## 2017-12-14 PROCEDURE — 85025 COMPLETE CBC W/AUTO DIFF WBC: CPT

## 2017-12-14 PROCEDURE — 2060000000 HC ICU INTERMEDIATE R&B

## 2017-12-14 PROCEDURE — 6360000002 HC RX W HCPCS: Performed by: STUDENT IN AN ORGANIZED HEALTH CARE EDUCATION/TRAINING PROGRAM

## 2017-12-14 PROCEDURE — C1887 CATHETER, GUIDING: HCPCS

## 2017-12-14 PROCEDURE — 36247 INS CATH ABD/L-EXT ART 3RD: CPT

## 2017-12-14 PROCEDURE — B41D1ZZ FLUOROSCOPY OF AORTA AND BILATERAL LOWER EXTREMITY ARTERIES USING LOW OSMOLAR CONTRAST: ICD-10-PCS | Performed by: SURGERY

## 2017-12-14 PROCEDURE — 86140 C-REACTIVE PROTEIN: CPT

## 2017-12-14 PROCEDURE — C1769 GUIDE WIRE: HCPCS

## 2017-12-14 PROCEDURE — 75716 ARTERY X-RAYS ARMS/LEGS: CPT

## 2017-12-14 PROCEDURE — 2580000003 HC RX 258: Performed by: STUDENT IN AN ORGANIZED HEALTH CARE EDUCATION/TRAINING PROGRAM

## 2017-12-14 PROCEDURE — 6370000000 HC RX 637 (ALT 250 FOR IP): Performed by: STUDENT IN AN ORGANIZED HEALTH CARE EDUCATION/TRAINING PROGRAM

## 2017-12-14 PROCEDURE — 6360000002 HC RX W HCPCS: Performed by: EMERGENCY MEDICINE

## 2017-12-14 PROCEDURE — 99223 1ST HOSP IP/OBS HIGH 75: CPT | Performed by: INTERNAL MEDICINE

## 2017-12-14 PROCEDURE — C1760 CLOSURE DEV, VASC: HCPCS

## 2017-12-14 PROCEDURE — 2580000003 HC RX 258: Performed by: EMERGENCY MEDICINE

## 2017-12-14 PROCEDURE — 82947 ASSAY GLUCOSE BLOOD QUANT: CPT

## 2017-12-14 PROCEDURE — C1894 INTRO/SHEATH, NON-LASER: HCPCS

## 2017-12-14 PROCEDURE — 80048 BASIC METABOLIC PNL TOTAL CA: CPT

## 2017-12-14 PROCEDURE — 80061 LIPID PANEL: CPT

## 2017-12-14 PROCEDURE — 75774 ARTERY X-RAY EACH VESSEL: CPT

## 2017-12-14 PROCEDURE — 2580000003 HC RX 258: Performed by: HOSPITALIST

## 2017-12-14 PROCEDURE — 94762 N-INVAS EAR/PLS OXIMTRY CONT: CPT

## 2017-12-14 RX ORDER — DEXTROSE AND SODIUM CHLORIDE 5; .9 G/100ML; G/100ML
INJECTION, SOLUTION INTRAVENOUS CONTINUOUS
Status: DISCONTINUED | OUTPATIENT
Start: 2017-12-14 | End: 2017-12-15

## 2017-12-14 RX ADMIN — DEXTROSE MONOHYDRATE 12.5 G: 25 INJECTION, SOLUTION INTRAVENOUS at 06:16

## 2017-12-14 RX ADMIN — VANCOMYCIN HYDROCHLORIDE 1500 MG: 1 INJECTION, POWDER, LYOPHILIZED, FOR SOLUTION INTRAVENOUS at 22:30

## 2017-12-14 RX ADMIN — TAZOBACTAM SODIUM AND PIPERACILLIN SODIUM 3.38 G: 375; 3 INJECTION, SOLUTION INTRAVENOUS at 12:51

## 2017-12-14 RX ADMIN — DEXTROSE MONOHYDRATE 12.5 G: 25 INJECTION, SOLUTION INTRAVENOUS at 12:45

## 2017-12-14 RX ADMIN — TAZOBACTAM SODIUM AND PIPERACILLIN SODIUM 3.38 G: 375; 3 INJECTION, SOLUTION INTRAVENOUS at 21:03

## 2017-12-14 RX ADMIN — TAZOBACTAM SODIUM AND PIPERACILLIN SODIUM 3.38 G: 375; 3 INJECTION, SOLUTION INTRAVENOUS at 06:19

## 2017-12-14 RX ADMIN — DEXTROSE MONOHYDRATE 12.5 G: 25 INJECTION, SOLUTION INTRAVENOUS at 04:00

## 2017-12-14 RX ADMIN — ASPIRIN 81 MG: 81 TABLET, COATED ORAL at 09:47

## 2017-12-14 RX ADMIN — DEXTROSE AND SODIUM CHLORIDE: 5; 900 INJECTION, SOLUTION INTRAVENOUS at 21:03

## 2017-12-14 RX ADMIN — DEXTROSE MONOHYDRATE 12.5 G: 25 INJECTION, SOLUTION INTRAVENOUS at 08:02

## 2017-12-14 RX ADMIN — DEXTROSE AND SODIUM CHLORIDE: 5; 900 INJECTION, SOLUTION INTRAVENOUS at 07:26

## 2017-12-14 RX ADMIN — ATORVASTATIN CALCIUM 20 MG: 20 TABLET, FILM COATED ORAL at 09:47

## 2017-12-14 ASSESSMENT — PAIN SCALES - GENERAL: PAINLEVEL_OUTOF10: 0

## 2017-12-14 NOTE — CONSULTS
Intravenous Q8H    insulin lispro  0-6 Units Subcutaneous TID WC    insulin lispro  0-3 Units Subcutaneous Nightly       Social History:     Social History     Social History    Marital status: Single     Spouse name: N/A    Number of children: N/A    Years of education: N/A     Occupational History    Not on file. Social History Main Topics    Smoking status: Never Smoker    Smokeless tobacco: Never Used    Alcohol use No    Drug use: No    Sexual activity: Not on file     Other Topics Concern    Not on file     Social History Narrative    No narrative on file       Family History:     Family History   Problem Relation Age of Onset    Arthritis Mother     Diabetes Father         Allergies:   Review of patient's allergies indicates no known allergies. Review of Systems:   Constitutional: No fevers, chills, systemic complaints  Head: No headaches  Eyes: No vision changes  ENT: No sore throat or runny nose. No hearing loss. Cardiovascular: No chest pain or palpitations. No dyspnea on exertion  Lung: No shortness of breath or cough. No sputum production  Abdomen: No nausea, vomiting, diarrhea. No abdominal pain or cramping  Genitourinary: No increased urinary frequency, or dysuria. Musculoskeletal: + Right foot: 2nd and 3rd digits appeared black and malodorous. There was no drainage at the site. No pain on palpation. +Left foot: the tip of the 2nd digit appeared black and malodorous. Negative for drainage. Negative for pain on palpation. Hematologic: No bruising or bleeding  Neurologic: No headache, weakness, numbness, or tingling. Skin: no rashes    Physical Examination :   Patient Vitals for the past 8 hrs:   BP Temp Temp src Pulse Resp SpO2   17 0346 (!) 129/48 98.8 °F (37.1 °C) Oral 78 13 97 %     Temp (24hrs), Av.8 °F (37.1 °C), Min:97.9 °F (36.6 °C), Max:99.7 °F (37.6 °C)      General Appearance: Awake, alert, in no apparent distress. Pleasant.    Mental status: good mentation  Head:  Normocephalic, no trauma  ENT: sclera anicteric. Moist mucosa. No thrush or oral lesions. Poor dentition  Neck: Supple, without lymphadenopathy. Pulmonary: Clear to auscultation, without appreciable wheezes, rales, or rhonchi. Cardiovascular: Regular rate and rhythm without murmurs. Abdomen: Soft, non tender, non distended. Normoactive bowel sounds. Extremities: Warm, well perfused. Right LE edema. + Right foot: 2nd and 3rd digits appeared black and malodorous. There was no drainage at the site. No pain on palpation. +Left foot: the tip of the 2nd digit appeared black and malodorous. Negative for drainage. Negative for pain on palpation. Neurologic: Decreased sensation in distal lower extremity digits. Skin: No rashes or other lesions. IV site w/o significant erythema    Medical Decision Making:   Labs    CBC with Differential: Recent Labs      12/13/17   1457  12/14/17   0828   WBC  17.9*  13.6*   HGB  11.6*  9.9*   HCT  36.8*  31.3*   PLT  518*  410   LYMPHOPCT  7*  8*   MONOPCT  7  7       BMP: Recent Labs      12/13/17   1457  12/14/17   0828   NA  131*  134*   K  3.8  4.0   CL  92*  97*   CO2  24  23   BUN  16  17   CREATININE  1.09  1.18       Hepatic Function Panel: Recent Labs      12/13/17   1457   PROT  8.2   LABALBU  3.7   BILIDIR  0.14   IBILI  0.29   BILITOT  0.43   ALKPHOS  81   ALT  38   AST  29       No results for input(s): RPR in the last 72 hours. No results for input(s): HIV in the last 72 hours. No results for input(s): BC in the last 72 hours. Lab Results   Component Value Date    RBC 4.01 12/14/2017    WBC 13.6 12/14/2017       Lab Results   Component Value Date    CREATININE 1.18 12/14/2017    GLUCOSE 130 12/14/2017    GLUCOSE 132 12/29/2011     XR Right foot 12/11/2017  Impression   1. New bony destruction involving the 2nd and 3rd distal phalanges suggesting   osteomyelitis   2.  Stable partial destruction of the 1st distal phalanx           Lower extremity Doppler 12/11/2017  Summary        Bilateral lower extremity ABIs and PVRs (with toe pressures ) were    performed for the evaluation of peripheral vascular disease. Study    demonstrates:        ABNORMAL        Right:    Mild to moderate vascular insufficiency at rest.        Left:    Mild to moderate vascular insufficiency at rest.     Lower extremity Doppler 9/26/2016   Summary        Normal PVR at rest of the bilateral lower extremity.    Normal PVR with exercise of the bilateral lower extremity.    Normal arterial PPG waveforms of the bilateral digits.    Severe small vessel and/or vasospastic disease of the left 2,3 digits. .................................................................................................................................... Thank you for allowing us to participate in the care of this patient. Please do not hesitate to call with questions.     Silvina Dee MD  12/14/2017

## 2017-12-14 NOTE — PROGRESS NOTES
Pt A&O. Answering questions appropriately. Pt's blood glucose 63. Pt given 8 oz of orange juice and 1 tube of glutose gel. Will continue to monitor. 2031: Pt's blood glucose 106 at this time. Pt A&O. Will recheck at 0000.    0406: Pt's blood glucose 46. Pt alert and oriented. 12.5 grams  Dextrose 50% solution given IV.  Will recheck blood glucose

## 2017-12-14 NOTE — PROGRESS NOTES
Nutrition Assessment    Type and Reason for Visit: Initial, Positive Nutrition Screen (Pressure Ulcer/Wound)    Nutrition Recommendations: Continue NPO - as able suggest start of a Carb Controlled diet with oral supplements as/if needed. Monitor for plan of care. Malnutrition Assessment:  · Malnutrition Status: Insufficient data    Nutrition Diagnosis:   · Problem: Increased nutrient needs  · Etiology: related to Increased demand for energy/nutrients due to healing     Signs and symptoms:  as evidenced by Presence of wounds    Nutrition Assessment:  · Subjective Assessment: Admitted with c/o foot pain - pt with dry gangrene and cellulitis to 2nd/3rd digit to right foot & 3rd digit to left foot. Pt NPO currently for angiogram to be completed. · Nutrition-Focused Physical Findings: +distended abdomen. · Wound Type: Dry gangrene to 2nd/3rd toes on right foot  · Current Nutrition Therapies:  · Oral Diet Orders: NPO   · Oral Diet intake: NPO  · Anthropometric Measures:  · Ht: 5' 10.87\" (180 cm)   · Current Body Wt: 195 lb (88.5 kg)  · Admission Body Wt: 195 lb (88.5 kg)  · Ideal Body Wt: 171 lb (77.6 kg), % Ideal Body 114%  · BMI Classification: BMI 25.0 - 29.9 Overweight  · Comparative Standards (Estimated Nutrition Needs):  · Estimated Daily Total Kcal: 5604-5194 kcal  · Estimated Daily Protein (g):  gm protein    Estimated Intake vs Estimated Needs: Intake Less Than Needs    Nutrition Risk Level: Moderate    Nutrition Interventions:   Continue NPO - As able suggest start of a Carb Controlled diet with ONS as/if needed. Continued Inpatient Monitoring, Education Not Indicated    Nutrition Evaluation:   · Evaluation: Goals set   · Goals: Meet % of estimated nutrient needs. · Monitoring: NPO Status, Diet Progression, Skin Integrity, Wound Healing, Fluid Balance, Weight, Pertinent Labs    See Adult Nutrition Doc Flowsheet for more detail.      Electronically signed by Raj Flynn RD, LD on

## 2017-12-14 NOTE — PLAN OF CARE
Problem: Nutrition  Goal: Optimal nutrition therapy  Outcome: Ongoing  Nutrition Problem: Increased nutrient needs  Intervention: Food and/or Nutrient Delivery: Continue NPO - As able suggest start of a Carb Controlled diet with ONS as/if needed. Nutritional Goals: Meet % of estimated nutrient needs.

## 2017-12-14 NOTE — PROGRESS NOTES
Physical Therapy  DATE: 2017    NAME: Rosalino Salvador  MRN: [id number]   : 1950    Patient not seen this date for Physical Therapy due to:  [] Blood transfusion in progress  [] Hemodialysis  []  Patient Declined  [] Spine Precautions   [] Strict Bedrest  [x] Surgery/ Procedure- cath lab today to perform LE angiogram and possible intervention depending on results. Pt currently with dry gangrene of 2nd and 3rd toes on R foot. Will evaluate tomorrow after surgical intervention. [] Testing      [] Other        [] PT being discontinued at this time. Patient independent. No further needs. [] PT being discontinued at this time as the patient has been transferred to palliative care. No further needs.     Danica Bedolla, PT

## 2017-12-14 NOTE — PROGRESS NOTES
[facility]  Occupational Therapy Not Seen Note    Patient not available for Occupational Therapy due to:    [] Testing:    [] Hemodialysis    [] Blood Transfusion in Progress    []Refusal by Patient:    [x] Surgery/Procedure: LE angiogram to be performed today with possible intervention. 2nd and 3rd digit dry gangrene and cellulitis of R foot. [] Strict Bedrest    [] Sedation    [] Spine Precautions     [] Pt being transferred to palliative care at this time. Spoke with pt/family and OT services to be defered. [] Pt independent with functional mobility and functional tasks.  Pt with no OT acute care needs at this time, will defer OT eval.    [] Other    Next Scheduled Treatment: [facility] 12/15    Kye Lopez, S/OT

## 2017-12-14 NOTE — PROGRESS NOTES
Smoking Cessation - topics covered   []  Health Risks  []  Benefits of Quitting   []  Smoking Cessation  [x]  Patient has no history of tobacco use  []  Patient is former smoker. [x]  No need for tobacco cessation education. []  Booklet given  []  Patient verbalizes understanding. []  Patient denies need for tobacco cessation education.   Jewels Denny  8:41 AM

## 2017-12-14 NOTE — PROGRESS NOTES
Results   Component Value Date    ALKPHOS 81 12/13/2017    ALT 38 12/13/2017    AST 29 12/13/2017    PROT 8.2 12/13/2017    BILITOT 0.43 12/13/2017    BILIDIR 0.14 12/13/2017    IBILI 0.29 12/13/2017    LABALBU 3.7 12/13/2017         ASSESSMENT     1. Chronic peripheral vascular disease. Abnormal PVR's and non-palpable b/l DP/TPs with non-healing wounds. Dry gangrene of 2nd and 3rd digit of right foot. Doppler signals present on b/l DP/PT/AT. PLAN    1. Continue supportive care per primary  2. Will proceed with angiogram today, 12/14. Consent to be obtained. 3. Pain control and wound care per primary team.  4. Post op orders to follow      Electronically signed by Ale Serrano  on 12/14/2017 at 6:16 AM       RESIDENT ADDENDUM:    Patient was seen and examined with medical student. I agree with interval history and physical as documented. I agree with the assessment and plan as noted with my revisions. Evaluated with team this morning. Patient reports some pain in the RLE but not worse than yesterday. Afebrile. Hemodynamically stable. NPO since midnight for cath lab today. RLE with significant dry gangrene to right foot 2nd and 3rd digits. The digits are demarcating without any drainage. The forefoot and ankle has evidence cellulitis with associated swelling but no crepitus. Left foot with distal aspect on the dorsal side of the 3rd digit turning black. No palpable pulses b/l but with doppler signals to PT/DP bilaterally. Will plan for cath lab today to perform LE angiogram and possible intervention depending on results. The risks benefits and alternatives were discussed with the patient. Perioperative preparation was discussed and all questions were answered. He expresses understanding of the intervention and consent was obtained with RN as witness. Pain control and wound care per primary team. Post operative orders to follow.        Electronically signed by Maged Hui DO on 12/14/2017 at

## 2017-12-14 NOTE — PROGRESS NOTES
symmetric air entry  Heart[de-identified] normal rate, regular rhythm, normal S1, S2, no murmurs, rubs, clicks or gallops  Abdomen:  soft, nontender, nondistended, no masses or organomegaly  Extremities: Gangrenous digits right digits 2 and 3, left digit 2. Pedal pulses non-palpable       Medications:   Scheduled Medications   vancomycin (VANCOCIN) intermittent dosing (placeholder)   Other RX Placeholder    vancomycin  1,500 mg Intravenous Q24H    aspirin EC  81 mg Oral Daily    atorvastatin  20 mg Oral Daily    sodium chloride flush  10 mL Intravenous 2 times per day    piperacillin-tazobactam  3.375 g Intravenous Q8H    insulin lispro  0-6 Units Subcutaneous TID WC    insulin lispro  0-3 Units Subcutaneous Nightly       PRN Medications  ammonium lactate  PRN   docusate sodium 100 mg BID PRN   ondansetron 4 mg Q8H PRN   povidone-iodine  PRN   sodium chloride flush 10 mL PRN   acetaminophen 650 mg Q4H PRN   magnesium hydroxide 30 mL Daily PRN   glucose 15 g PRN   dextrose 12.5 g PRN   glucagon (rDNA) 1 mg PRN   dextrose 100 mL/hr PRN   potassium chloride 40 mEq PRN   Or     potassium chloride 40 mEq PRN   Or     potassium chloride 10 mEq PRN       Diagnostic Labs and Imaging    CBC:  Recent Labs      12/13/17   1457   WBC  17.9*   HGB  11.6*   PLT  518*     BMP: Recent Labs      12/13/17   1457   NA  131*   K  3.8   CL  92*   CO2  24   BUN  16   CREATININE  1.09   GLUCOSE  66*     Hepatic: Recent Labs      12/13/17   1457   AST  29   ALT  38   BILITOT  0.43   ALKPHOS  81     INR: No results for input(s): INR in the last 72 hours. Troponin: No results for input(s): TROPONINI in the last 72 hours. Assessment and Plan:   Principal Problem:    Diabetic gangrene ([address]  Active Problems:    Type 2 diabetes mellitus with circulatory disorder (HCC)    Hypertension    Hyponatremia    Microcytic anemia    -IV D5W 100ml/hr.  Stop D5 if blood sugar exceeds 250.   - NPO for vascular procedure today   -Low dose insulin sliding scale. Glucose checks [id number].  -Resume home blood pressure medications  -Vascular- Will plan to take patient to the cath lab tomorrow afternoon for angiogram of the RLE and possible endovascular intervention vs surgical options. Recommendations to follow angiogram today. -ID consult-  Vancomycin 1.5gm q24hr, Zosyn 4.5 q6hrs.     Wander Rosas, PGY-1  Internal Medicine Department  [place], [place], [place]

## 2017-12-14 NOTE — FLOWSHEET NOTE
Visit:  Initial rounding visit with patient who is lying in bed in well lit room. Assessment:  Receptive to 's presence, greeted with smile. Anxious about surgery for \"having two toes taken off\". Frustrated with his 3 daughters who do not stay in touch with him very well. Intervention:  Listening presence as patient talked about surgery and his family. Patient said he has a [place] and Orthodox patience tradition.  provided prayer. Conversation about family led to conversation about advance directives. Patient said he would make his sister his MPOA.  explained documents, helped patient complete them and with proper witnesses patient signed documents.  provided copy to patient and placed one in patient's chart. Patient thanked  for the \"help\". Plan:  Remain available for further spiritual or emotional support for patient as needed while hospitalized. 12/14/17 1045   Encounter Summary   Services provided to: Patient   Referral/Consult From: [facility] Family members   Place of [address] [facility]   Continue Visiting (12/14)   Complexity of Encounter Moderate   Length of Encounter 1 hour   Spiritual Assessment Completed Yes   Routine   Type Initial   Assessment Anxious; Approachable   Intervention Explored feelings, thoughts, concerns; Active listening;Discussed illness/injury and it's impact   Outcome Expressed feelings/needs/concerns;Comfort;Expressed gratitude   Spiritual/Druze   Type Spiritual support   Intervention Prayer   Advance Directives (For Healthcare)   Healthcare Directive Yes, patient has an advance directive for healthcare treatment   Copy in Chart Yes, copy in chart   Advance Directives Documents given;Documents explained;Living will completed; Healthcare power of attornery completed   Healthcare Agent Appointed Adult siblings   Healthcare Agent's Name John  Agent's Phone

## 2017-12-15 ENCOUNTER — ANESTHESIA EVENT (OUTPATIENT)
Dept: OPERATING ROOM | Age: 67
DRG: 256 | End: 2017-12-15
Payer: MEDICARE

## 2017-12-15 ENCOUNTER — APPOINTMENT (OUTPATIENT)
Dept: GENERAL RADIOLOGY | Age: 67
DRG: 256 | End: 2017-12-15
Payer: MEDICARE

## 2017-12-15 LAB
ABSOLUTE EOS #: 0.25 K/UL (ref 0–0.44)
ABSOLUTE IMMATURE GRANULOCYTE: 0.05 K/UL (ref 0–0.3)
ABSOLUTE LYMPH #: 1.46 K/UL (ref 1.1–3.7)
ABSOLUTE MONO #: 0.78 K/UL (ref 0.1–1.2)
ANION GAP SERPL CALCULATED.3IONS-SCNC: 14 MMOL/L (ref 9–17)
BASOPHILS # BLD: 1 % (ref 0–2)
BASOPHILS ABSOLUTE: 0.06 K/UL (ref 0–0.2)
BUN BLDV-MCNC: 12 MG/DL (ref 8–23)
BUN/CREAT BLD: ABNORMAL (ref 9–20)
C-REACTIVE PROTEIN: 94.2 MG/L (ref 0–5)
CALCIUM SERPL-MCNC: 7.6 MG/DL (ref 8.6–10.4)
CHLORIDE BLD-SCNC: 99 MMOL/L (ref 98–107)
CO2: 21 MMOL/L (ref 20–31)
CREAT SERPL-MCNC: 1.22 MG/DL (ref 0.7–1.2)
DIFFERENTIAL TYPE: ABNORMAL
EOSINOPHILS RELATIVE PERCENT: 2 % (ref 1–4)
GFR AFRICAN AMERICAN: >60 ML/MIN
GFR NON-AFRICAN AMERICAN: 59 ML/MIN
GFR SERPL CREATININE-BSD FRML MDRD: ABNORMAL ML/MIN/{1.73_M2}
GFR SERPL CREATININE-BSD FRML MDRD: ABNORMAL ML/MIN/{1.73_M2}
GLUCOSE BLD-MCNC: 139 MG/DL (ref 75–110)
GLUCOSE BLD-MCNC: 155 MG/DL (ref 75–110)
GLUCOSE BLD-MCNC: 175 MG/DL (ref 75–110)
GLUCOSE BLD-MCNC: 206 MG/DL (ref 75–110)
GLUCOSE BLD-MCNC: 241 MG/DL (ref 75–110)
GLUCOSE BLD-MCNC: 244 MG/DL (ref 75–110)
GLUCOSE BLD-MCNC: 248 MG/DL (ref 70–99)
GLUCOSE BLD-MCNC: 250 MG/DL (ref 75–110)
GLUCOSE BLD-MCNC: 263 MG/DL (ref 75–110)
HCT VFR BLD CALC: 32.7 % (ref 40.7–50.3)
HEMOGLOBIN: 10.1 G/DL (ref 13–17)
IMMATURE GRANULOCYTES: 0 %
LYMPHOCYTES # BLD: 11 % (ref 24–43)
MCH RBC QN AUTO: 24.6 PG (ref 25.2–33.5)
MCHC RBC AUTO-ENTMCNC: 30.9 G/DL (ref 28.4–34.8)
MCV RBC AUTO: 79.8 FL (ref 82.6–102.9)
MONOCYTES # BLD: 6 % (ref 3–12)
PDW BLD-RTO: 14.9 % (ref 11.8–14.4)
PLATELET # BLD: 452 K/UL (ref 138–453)
PLATELET ESTIMATE: ABNORMAL
PMV BLD AUTO: 9.3 FL (ref 8.1–13.5)
POTASSIUM SERPL-SCNC: 4.3 MMOL/L (ref 3.7–5.3)
RBC # BLD: 4.1 M/UL (ref 4.21–5.77)
RBC # BLD: ABNORMAL 10*6/UL
SEG NEUTROPHILS: 80 % (ref 36–65)
SEGMENTED NEUTROPHILS ABSOLUTE COUNT: 10.26 K/UL (ref 1.5–8.1)
SODIUM BLD-SCNC: 134 MMOL/L (ref 135–144)
WBC # BLD: 12.9 K/UL (ref 3.5–11.3)
WBC # BLD: ABNORMAL 10*3/UL

## 2017-12-15 PROCEDURE — 6360000002 HC RX W HCPCS: Performed by: STUDENT IN AN ORGANIZED HEALTH CARE EDUCATION/TRAINING PROGRAM

## 2017-12-15 PROCEDURE — 36415 COLL VENOUS BLD VENIPUNCTURE: CPT

## 2017-12-15 PROCEDURE — 99232 SBSQ HOSP IP/OBS MODERATE 35: CPT | Performed by: INTERNAL MEDICINE

## 2017-12-15 PROCEDURE — 97530 THERAPEUTIC ACTIVITIES: CPT

## 2017-12-15 PROCEDURE — G8988 SELF CARE GOAL STATUS: HCPCS

## 2017-12-15 PROCEDURE — 80048 BASIC METABOLIC PNL TOTAL CA: CPT

## 2017-12-15 PROCEDURE — 82947 ASSAY GLUCOSE BLOOD QUANT: CPT

## 2017-12-15 PROCEDURE — G8978 MOBILITY CURRENT STATUS: HCPCS

## 2017-12-15 PROCEDURE — 0QBQ0ZZ EXCISION OF RIGHT TOE PHALANX, OPEN APPROACH: ICD-10-PCS | Performed by: PODIATRIST

## 2017-12-15 PROCEDURE — 6370000000 HC RX 637 (ALT 250 FOR IP): Performed by: STUDENT IN AN ORGANIZED HEALTH CARE EDUCATION/TRAINING PROGRAM

## 2017-12-15 PROCEDURE — 99233 SBSQ HOSP IP/OBS HIGH 50: CPT | Performed by: INTERNAL MEDICINE

## 2017-12-15 PROCEDURE — 2060000000 HC ICU INTERMEDIATE R&B

## 2017-12-15 PROCEDURE — G8979 MOBILITY GOAL STATUS: HCPCS

## 2017-12-15 PROCEDURE — G8987 SELF CARE CURRENT STATUS: HCPCS

## 2017-12-15 PROCEDURE — 2580000003 HC RX 258: Performed by: HOSPITALIST

## 2017-12-15 PROCEDURE — 94762 N-INVAS EAR/PLS OXIMTRY CONT: CPT

## 2017-12-15 PROCEDURE — 73630 X-RAY EXAM OF FOOT: CPT

## 2017-12-15 PROCEDURE — 6370000000 HC RX 637 (ALT 250 FOR IP): Performed by: HOSPITALIST

## 2017-12-15 PROCEDURE — 85025 COMPLETE CBC W/AUTO DIFF WBC: CPT

## 2017-12-15 PROCEDURE — 97535 SELF CARE MNGMENT TRAINING: CPT

## 2017-12-15 PROCEDURE — 86140 C-REACTIVE PROTEIN: CPT

## 2017-12-15 PROCEDURE — 97166 OT EVAL MOD COMPLEX 45 MIN: CPT

## 2017-12-15 PROCEDURE — 2580000003 HC RX 258: Performed by: EMERGENCY MEDICINE

## 2017-12-15 PROCEDURE — 6360000002 HC RX W HCPCS: Performed by: EMERGENCY MEDICINE

## 2017-12-15 PROCEDURE — 97162 PT EVAL MOD COMPLEX 30 MIN: CPT

## 2017-12-15 RX ORDER — INSULIN GLARGINE 100 [IU]/ML
10 INJECTION, SOLUTION SUBCUTANEOUS NIGHTLY
Status: CANCELLED | OUTPATIENT
Start: 2017-12-15

## 2017-12-15 RX ADMIN — SODIUM CHLORIDE: 9 INJECTION, SOLUTION INTRAVENOUS at 03:00

## 2017-12-15 RX ADMIN — VANCOMYCIN HYDROCHLORIDE 1500 MG: 1 INJECTION, POWDER, LYOPHILIZED, FOR SOLUTION INTRAVENOUS at 22:27

## 2017-12-15 RX ADMIN — TAZOBACTAM SODIUM AND PIPERACILLIN SODIUM 3.38 G: 375; 3 INJECTION, SOLUTION INTRAVENOUS at 21:43

## 2017-12-15 RX ADMIN — INSULIN LISPRO 2 UNITS: 100 INJECTION, SOLUTION INTRAVENOUS; SUBCUTANEOUS at 21:43

## 2017-12-15 RX ADMIN — TAZOBACTAM SODIUM AND PIPERACILLIN SODIUM 3.38 G: 375; 3 INJECTION, SOLUTION INTRAVENOUS at 05:58

## 2017-12-15 RX ADMIN — INSULIN LISPRO 2 UNITS: 100 INJECTION, SOLUTION INTRAVENOUS; SUBCUTANEOUS at 04:30

## 2017-12-15 RX ADMIN — INSULIN LISPRO 4 UNITS: 100 INJECTION, SOLUTION INTRAVENOUS; SUBCUTANEOUS at 08:55

## 2017-12-15 RX ADMIN — TAZOBACTAM SODIUM AND PIPERACILLIN SODIUM 3.38 G: 375; 3 INJECTION, SOLUTION INTRAVENOUS at 14:09

## 2017-12-15 RX ADMIN — ASPIRIN 81 MG: 81 TABLET, COATED ORAL at 08:54

## 2017-12-15 RX ADMIN — ATORVASTATIN CALCIUM 20 MG: 20 TABLET, FILM COATED ORAL at 08:54

## 2017-12-15 RX ADMIN — INSULIN LISPRO 4 UNITS: 100 INJECTION, SOLUTION INTRAVENOUS; SUBCUTANEOUS at 09:04

## 2017-12-15 ASSESSMENT — PAIN SCALES - GENERAL: PAINLEVEL_OUTOF10: 0

## 2017-12-15 NOTE — PLAN OF CARE
Problem: Risk for Impaired Skin Integrity  Goal: Tissue integrity - skin and mucous membranes  Structural intactness and normal physiological function of skin and  mucous membranes. Intervention: SKIN ASSESSMENT  Pt skin assessed. Wounds documented on doc flowsheet. Pt repositions self and linens remain clean and dry. Will continue to monitor. Problem: Falls - Risk of  Goal: Absence of falls  Outcome: Ongoing  Pt remains free from falls. Pt bed locked in lowest position, fall sign posted, and non skid socks are on. Pt call light within reach and bed alarm on for additional safety. Will continue to monitor.

## 2017-12-15 NOTE — OP NOTE
[address], [facility], [address] [facility] [address] REPORT    Patient: Makeda Potter  : 1950  Sex: Male  Attending: Anderson Easley MD  Dictated by: Gm Garcia D.O. Date Dictated: 2017  Date of Surgery: 2017    SURGEON: Anderson Easley MD    ASSISTANT: Gm Garcia DO, PGY-2    PREOPERATIVE DIAGNOSIS: Dry gangrene to 2nd/3rd digits of right foot     POSTOPERATIVE DIAGNOSIS: Same    PROCEDURE:   1. Ultrasound guided access of the left common femoral artery  2. Selective catheterization of the left superficial femoral artery   3. Left lower extremity angiogram with run-off to the foot  4. Abdominal aortogram  5. Right lower extremity angiogram     ANESTHESIA: Local    ESTIMATED BLOOD LOSS: 10cc    INDICATIONS:    This is a 69year old male admitted to the hospital on 2017 due to pain in his feet and gangrene of his 2nd and 3rd digits on the right foot. The patient states this has been going on for several weeks but denied any history of non-healing wounds on that foot prior to this occurrence. The patient has a long standing history of diabetes with neuropathy. The patient did have PVRs and MEI's performed and ABIs were normal, PVRs with abnormal biphasic waveforms bilaterally. Given the wounds/gangrene to the right foot and non-palpable pulses the decision was made to proceed to the catheterization suite to evaluate for arterial insufficiency and peripheral vascular disease. The patient was agreeable and consent was obtained with RN as a witness. DESCRIPTION OF PROCEDURE:       The patient was brought to the catheterization suite, placed in the supine position. Bilateral groin sites were prepped and draped in the standard sterile fashion. A time-out was performed and agreed upon. The patient was given sedation with Versed and fentanyl. The left common femoral artery was evaluated under ultrasound and was compressible.   It was acssessed using micropuncture technique after local anesthesia had been delivered. At this time we up-sized to a 5-Azerbaijani sheath over a wire a catheter was brought into the abdominal aorta over a wire and over the aortic bifurcation. Contrast was delivered demonstrating a patent common iliac, external iliac, common femoral, superficial femoral and profunda. Next, we selectively catheterized the right superficial femoral artery and parked the catheter there. Using contrast, we completed the right lower extremity angiogram with runoff to the foot. In addition to the patent femoral arteries the patient had patent popliteal arterial flow with in-line flow through the posterior tibial artery to the foot filling the plantar arch. The anterior tibial artery had severe proximal disease but reconstitutes at the ankle with collaterals from the posterior tibial and peroneal arteries. The wire was then removed and in order to limit the amount of contrast and assess the left lower extremity, we directly administered contrast to the sheath in the left common femoral artery and followed the run off to the foot in sequence. On the left side the patient had a patent left common iliac, external iliac, common femoral, superficial femoral, profunda. Additional contrast was administered and we were able to capture a clear view of patent flow through the popliteal to the tibial peroneal trunk which appeared to have adequate flow to the ankle; however due to the patient's kidney function we opted to hold off on any additional contrast. Once the angiogram was completed, the catheter was  removed, and the 5-Azerbaijani sheath was exchanged for the Minx closure device and the artery was successfully closed without any evidence of continued bleeding in the groin. The patient was taken to the recovery room in stable condition. Dr. Rom Asencio was present and participating for the entire case.      Plan:     Although the patient had significant disease to the anterior tibial artery on the right side, the patient had very good flow through the posterior tibial artery and the flow in the distal anterior tibial artery reconstitutes providing adequate blood flow to the right foot. We would recommend proceeding with podiatry surgery as needed and feel he does have adequate blood flow to the foot to heal a surgery. Thank you for involving us in the care of this patient.      -----------------------------------------------    Va Story DO, PGY-2  Pager#: [phone] [address] [place], [place].

## 2017-12-15 NOTE — PROGRESS NOTES
frequency, or dysuria. Musculoskeletal: + Right foot: 2nd and 3rd digits appeared black and malodorous. There was no drainage at the site. No pain on palpation. +Left foot: the tip of the 2nd digit appeared black and malodorous. Negative for drainage. Negative for pain on palpation. Hematologic: No bruising or bleeding  Neurologic: No headache, weakness, numbness, or tingling. Skin: no rashes    Physical Examination : 12/15/2017     Patient Vitals for the past 8 hrs:   BP Temp Temp src Pulse Resp   12/15/17 0425 (!) 137/57 98 °F (36.7 °C) Oral 77 22     Temp (24hrs), Av.9 °F (36.6 °C), Min:97.6 °F (36.4 °C), Max:98.1 °F (36.7 °C)      General Appearance: Awake, alert, in no apparent distress. Mental status: Good mentation  Head:  Normocephalic, no trauma  ENT: sclera anicteric. Moist mucosa. No thrush or oral lesions. Poor dentition  Neck: Supple, no cervical lymphadenopathy  Pulmonary: Clear lung fields on auscultation, No wheezes, rales, or rhonchi. Cardiovascular: RRR, no murmurs  Abdomen: Soft, non tender, non distended. +BS   Extremities: Warm, decreased perfusion. Right LE edema. + Right foot: 2nd and 3rd digits appeared black and malodorous. There was no drainage at the site. No pain on palpation. +Left foot: the tip of the 2nd digit appeared black and malodorous. Negative for drainage. Negative for pain on palpation. Neurologic: Decreased sensation in distal lower extremity digits. Skin: No rashes or other lesions.   IV site w/o significant erythema    Medical Decision Making:   Labs    CBC with Differential:   Recent Labs      17   1457  17   0828   WBC  17.9*  13.6*   HGB  11.6*  9.9*   HCT  36.8*  31.3*   PLT  518*  410   LYMPHOPCT  7*  8*   MONOPCT  7  7       BMP:   Recent Labs      17   1457  17   0828   NA  131*  134*   K  3.8  4.0   CL  92*  97*   CO2  24  23   BUN  16  17   CREATININE  1.09  1.18       Hepatic Function Panel:   Recent Labs      17

## 2017-12-15 NOTE — PROGRESS NOTES
Vascular Surgery Progress Note            PATIENT NAME: Micahel Sadler     TODAY'S DATE: 12/15/2017, 7:02 AM    SUBJECTIVE:    Patient seen and examined. Resting comfortably. Denies F/N/V/C. Denies any distal extremity pain or numbness. Afebrile. VSS. OBJECTIVE:   Vitals:  BP (!) 137/57   Pulse 77   Temp 98 °F (36.7 °C) (Oral)   Resp 22   Ht 5' 10.87\" (1.8 m)   Wt 195 lb (88.5 kg)   SpO2 100%   BMI 27.30 kg/m²      INTAKE/OUTPUT:      Intake/Output Summary (Last 24 hours) at 12/15/17 6302  Last data filed at 12/15/17 7432   Gross per 24 hour   Intake             2529 ml   Output              650 ml   Net             1879 ml        General: AAOx3, well appearing male, appears stated age  Lungs: Patient breathing comfortably  Heart: S1S2 present, normal rate and rhythm  Abdomen: Soft, non-distended, nontender, normal bowel sounds  Extremity: Dry gangrenous 2nd and 3rd digits on right foot. No discharge or abscess observed. Surrounding cellulitis forefoot and ankle. DP/TPs doppler b/l. Popliteal and femoral pulses palpable b/l.  Left 3rd digit - dorsal aspect turning black   Inguinal: Left groin without any hematoma or swelling, palpable femoral pulse    Data:  CBC with Differential:    Lab Results   Component Value Date    WBC 13.6 12/14/2017    RBC 4.01 12/14/2017    HGB 9.9 12/14/2017    HCT 31.3 12/14/2017     12/14/2017    MCV 78.1 12/14/2017    MCH 24.7 12/14/2017    MCHC 31.6 12/14/2017    RDW 15.0 12/14/2017    LYMPHOPCT 8 12/14/2017    MONOPCT 7 12/14/2017    BASOPCT 0 12/14/2017    MONOSABS 1.01 12/14/2017    LYMPHSABS 1.11 12/14/2017    EOSABS 0.08 12/14/2017    BASOSABS <0.03 12/14/2017    DIFFTYPE NOT REPORTED 12/14/2017     BMP:    Lab Results   Component Value Date     12/14/2017    K 4.0 12/14/2017    CL 97 12/14/2017    CO2 23 12/14/2017    BUN 17 12/14/2017    LABALBU 3.7 12/13/2017    CREATININE 1.18 12/14/2017    CALCIUM 8.0 12/14/2017    GFRAA >60 12/14/2017    LABGLOM >60 12/14/2017    GLUCOSE 130 12/14/2017    GLUCOSE 132 12/29/2011     Hepatic Function Panel:    Lab Results   Component Value Date    ALKPHOS 81 12/13/2017    ALT 38 12/13/2017    AST 29 12/13/2017    PROT 8.2 12/13/2017    BILITOT 0.43 12/13/2017    BILIDIR 0.14 12/13/2017    IBILI 0.29 12/13/2017    LABALBU 3.7 12/13/2017         ASSESSMENT     1. Dry gangrene of 2nd and 3rd digit of right foot. 2. 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 Appears to have adequate blood flow to the feet that should assist in healing from any podiatry surgeries in the near future. PLAN    1. Continue supportive care per primary  2. From a vascular standpoint, after evaluating angiogram, it appears the patient has adequate flow to the foot to heal from podiatry procedures/surgeries/amputations. That said it is likely he has small vessel disease secondary to diabetes and would benefit from lifestyle modification moving forward as well as wound care/podiatry surgery. No bypass surgery indicated at this time. Would continue to monitor and told patient to follow up as needed if any concerning symptoms develop. Dr. Miranda Pore information in the follow up information in the chart and we are available should he need anything vascular related. Vascular surgery to sign off. Thank you for involving us in this patient's care.    3. Pain control and wound care per primary team.      Electronically signed by Clifford Jimenes DO  on 12/15/2017 at 7:02 AM

## 2017-12-15 NOTE — PROGRESS NOTES
Time   Individual Concurrent Group Co-treatment   Time In 1020         Time Out 1045         Minutes 25         Timed Code Treatment Minutes: [address] [place], PT

## 2017-12-15 NOTE — CONSULTS
CALCIUM 7.6 (L) 12/15/2017       Lab Results   Component Value Date    CRP 94.2 (H) 12/15/2017         Imaging:   MRI Foot Right W WO Contrast 12/15/17: pending    XR Foot Right 12/15/17:  Impression   1. Extensive soft tissue gas throughout the forefoot primarily about the 2nd   and 3rd digits.  Gas gangrene or necrotizing infection is not excluded on the   basis of this study. 2. Stable partial destruction of the distal phalanx of the 1st digit with   soft tissue swelling of the right great toe an overlying ulceration. Osteomyelitis is difficult to exclude. 3. Interval amputation of the 2nd and 3rd digits at the level of the DIP   joints. 4. Stable mild nonspecific cortical thickening along the 4th metatarsal   diaphysis. ASSESSMENT:     Patient is a 69 y.o. male with    - Wet gangrene Right 2nd and 3rd digits s/p partial toe amputations at bedside   - Early evidence dry gangrene noted to Left 2nd digit   - PAD s/p angio per Dr. Keyshawn Peck (DOS 12/14/17)   - Diabetes mellitus with peripheral neuropathy    Patient Active Problem List   Diagnosis    Diabetes mellitus ([address]    High blood pressure    Onychomycosis    Diabetic gangrene ([address]    Type 2 diabetes mellitus with circulatory disorder (HCC)    Hypertension    Hyponatremia    Microcytic anemia    Gangrene of right foot (HCC)    MRSA (methicillin resistant Staphylococcus aureus) infection    Osteomyelitis of right foot ([address]       PLAN:     Patient examined and evaluated. Discussed with patient signs of wet gangrene to Right 2nd & 3rd digits - patient agreeable to amputation in OR tomorrow   Scheduled for Saturday 12/16 to follow another AM case ? 9AM start time   NPO after midnight    AC held in AM   Writer to julio and consent prior to surgery   Verbal consent obtained from patient to perform bedside debridement of all gangrenous tissue Right 2nd & 3rd digits   No analgesia due to neuropathy   Sharp excisional debridement performed

## 2017-12-15 NOTE — PROGRESS NOTES
auscultation, no wheezes, rales or rhonchi, symmetric air entry  Heart[de-identified] normal rate, regular rhythm, normal S1, S2, no murmurs, rubs, clicks or gallops  Abdomen:  soft, nontender, nondistended, no masses or organomegaly  Extremities: Gangrenous digits right digits 2 and 3, left digit 2. Pedal pulses audible on doppler       Medications:   Scheduled Medications   insulin lispro  0-12 Units Subcutaneous TID WC    insulin lispro  0-6 Units Subcutaneous Nightly    vancomycin (VANCOCIN) intermittent dosing (placeholder)   Other RX Placeholder    vancomycin  1,500 mg Intravenous Q24H    aspirin EC  81 mg Oral Daily    atorvastatin  20 mg Oral Daily    sodium chloride flush  10 mL Intravenous 2 times per day    piperacillin-tazobactam  3.375 g Intravenous Q8H       PRN Medications    ammonium lactate  PRN   docusate sodium 100 mg BID PRN   ondansetron 4 mg Q8H PRN   povidone-iodine  PRN   sodium chloride flush 10 mL PRN   acetaminophen 650 mg Q4H PRN   magnesium hydroxide 30 mL Daily PRN   glucose 15 g PRN   dextrose 12.5 g PRN   glucagon (rDNA) 1 mg PRN   dextrose 100 mL/hr PRN   potassium chloride 40 mEq PRN   Or     potassium chloride 40 mEq PRN   Or     potassium chloride 10 mEq PRN       Diagnostic Labs and Imaging    CBC:  Recent Labs      12/13/17   1457  12/14/17   0828   WBC  17.9*  13.6*   HGB  11.6*  9.9*   PLT  518*  410     BMP:   Recent Labs      12/13/17   1457  12/14/17   0828   NA  131*  134*   K  3.8  4.0   CL  92*  97*   CO2  24  23   BUN  16  17   CREATININE  1.09  1.18   GLUCOSE  66*  130*     Hepatic:   Recent Labs      12/13/17   1457   AST  29   ALT  38   BILITOT  0.43   ALKPHOS  81     INR: No results for input(s): INR in the last 72 hours. Troponin: No results for input(s): TROPONINI in the last 72 hours.     Assessment and Plan:   Principal Problem:    Diabetic gangrene ([facility]  Active Problems:    Type 2 diabetes mellitus with circulatory disorder (HCC)    Hypertension    Hyponatremia

## 2017-12-15 NOTE — PROGRESS NOTES
Occupational Therapy   Occupational Therapy Initial Assessment  Date: 12/15/2017   Patient Name: Thais Calix  MRN: [id number]     : 1950    Patient Diagnosis(es): The primary encounter diagnosis was Gangrene of right foot ([facility] A diagnosis of Osteomyelitis of right foot, unspecified type [facility]) was also pertinent to this visit. has a past medical history of Hypertension; MRSA (methicillin resistant staph aureus) culture positive; and Type II or unspecified type diabetes mellitus without mention of complication, not stated as uncontrolled. has no past surgical history on file. Restrictions  Restrictions/Precautions  Restrictions/Precautions: Fall Risk  Required Braces or Orthoses?: No  Position Activity Restriction  Other position/activity restrictions: Up with assistance    Subjective   General  Chart Reviewed: No  Patient assessed for rehabilitation services?: Yes  Family / Caregiver Present: No  General Comment  Comments: RN ok'd for therapy this date. Pt agreeable to participate in session and pleasant/cooperative throughout.   Pain Assessment  Patient Currently in Pain: Denies  Pain Assessment: 0-10  Pain Level: 0     Social/Functional History  Social/Functional History  Lives With: Alone  Type of Home: House  Home Layout: Two level, Bed/Bath upstairs, Laundry in basement  Home Access: Stairs to enter with rails  Entrance Stairs - Number of Steps: 5 steps  Entrance Stairs - Rails: Right  Bathroom Shower/Tub: Tub/Shower unit  Bathroom Toilet: Standard  Bathroom Equipment: Grab bars in shower, Shower chair  Bathroom Accessibility: Accessible  Home Equipment:  (Pt reports no DME)  ADL Assistance: Independent  Homemaking Assistance: Independent  Homemaking Responsibilities: Yes  Meal Prep Responsibility: Primary  Laundry Responsibility: Primary  Cleaning Responsibility: Primary  Bill Paying/Finance Responsibility: Primary  Shopping Responsibility: Primary  Health Care Management: Primary  Ambulation transfers/functional mobility prior to a safe discharge home. Prognosis: Good  Decision Making: Low Complexity  Patient Education: OT Services/OT POC, Safety Awareness/Fall Prevention, Importance of EOB/OOB Activity and Active Participation in ADLs, Home Safety, good return  REQUIRES OT FOLLOW UP: Yes  Activity Tolerance  Activity Tolerance: Patient Tolerated treatment well  Safety Devices  Safety Devices in place: Yes  Type of devices: Call light within reach;Nurse notified; Left in bed  Restraints  Initially in place: No         Plan   Plan  Times per week: 3-4x/wk  Times per day: Daily    G-Code  OT G-codes  Functional Assessment Tool Used: BARTHEL INDEX  Score: 15/20  Functional Limitation: Self care  Self Care Current Status (): At least 20 percent but less than 40 percent impaired, limited or restricted  Self Care Goal Status (): At least 1 percent but less than 20 percent impaired, limited or restricted    Goals  Short term goals  Time Frame for Short term goals: Pt will, by discharge:  Short term goal 1: perform functional transfers/functional mobility independently  Short term goal 2: demo 20+ minutes standing tolerance for increased participation in ADLs  Short term goal 3: perform ADL tasks independently  Short term goal 4: independently demo safety awareness throughout ADL performance and functional transfers/functional mobility     Therapy Time   Individual Concurrent Group Co-treatment   Time In          Time Out 1428         Minutes 35            Discharge recommendations discussed with patient during initial evaluation.     LEEANN Barakat/L

## 2017-12-16 ENCOUNTER — ANESTHESIA (OUTPATIENT)
Dept: OPERATING ROOM | Age: 67
DRG: 256 | End: 2017-12-16
Payer: MEDICARE

## 2017-12-16 VITALS — SYSTOLIC BLOOD PRESSURE: 128 MMHG | OXYGEN SATURATION: 98 % | TEMPERATURE: 98 F | DIASTOLIC BLOOD PRESSURE: 52 MMHG

## 2017-12-16 LAB
ABSOLUTE EOS #: 0.28 K/UL (ref 0–0.44)
ABSOLUTE IMMATURE GRANULOCYTE: 0.07 K/UL (ref 0–0.3)
ABSOLUTE LYMPH #: 1.18 K/UL (ref 1.1–3.7)
ABSOLUTE MONO #: 0.66 K/UL (ref 0.1–1.2)
ANION GAP SERPL CALCULATED.3IONS-SCNC: 11 MMOL/L (ref 9–17)
ANION GAP SERPL CALCULATED.3IONS-SCNC: 12 MMOL/L (ref 9–17)
BASOPHILS # BLD: 1 % (ref 0–2)
BASOPHILS ABSOLUTE: 0.04 K/UL (ref 0–0.2)
BUN BLDV-MCNC: 12 MG/DL (ref 8–23)
BUN BLDV-MCNC: 8 MG/DL (ref 8–23)
BUN/CREAT BLD: ABNORMAL (ref 9–20)
BUN/CREAT BLD: ABNORMAL (ref 9–20)
CALCIUM SERPL-MCNC: 5.4 MG/DL (ref 8.6–10.4)
CALCIUM SERPL-MCNC: 7.9 MG/DL (ref 8.6–10.4)
CHLORIDE BLD-SCNC: 100 MMOL/L (ref 98–107)
CHLORIDE BLD-SCNC: 111 MMOL/L (ref 98–107)
CO2: 18 MMOL/L (ref 20–31)
CO2: 22 MMOL/L (ref 20–31)
CREAT SERPL-MCNC: 0.73 MG/DL (ref 0.7–1.2)
CREAT SERPL-MCNC: 1.14 MG/DL (ref 0.7–1.2)
DIFFERENTIAL TYPE: ABNORMAL
EOSINOPHILS RELATIVE PERCENT: 3 % (ref 1–4)
GFR AFRICAN AMERICAN: >60 ML/MIN
GFR AFRICAN AMERICAN: >60 ML/MIN
GFR NON-AFRICAN AMERICAN: >60 ML/MIN
GFR NON-AFRICAN AMERICAN: >60 ML/MIN
GFR SERPL CREATININE-BSD FRML MDRD: ABNORMAL ML/MIN/{1.73_M2}
GLUCOSE BLD-MCNC: 103 MG/DL (ref 70–99)
GLUCOSE BLD-MCNC: 124 MG/DL (ref 75–110)
GLUCOSE BLD-MCNC: 124 MG/DL (ref 75–110)
GLUCOSE BLD-MCNC: 136 MG/DL (ref 75–110)
GLUCOSE BLD-MCNC: 157 MG/DL (ref 75–110)
GLUCOSE BLD-MCNC: 175 MG/DL (ref 70–99)
GLUCOSE BLD-MCNC: 185 MG/DL (ref 75–110)
GLUCOSE BLD-MCNC: 198 MG/DL (ref 75–110)
GLUCOSE BLD-MCNC: 198 MG/DL (ref 75–110)
HCT VFR BLD CALC: 24.1 % (ref 40.7–50.3)
HCT VFR BLD CALC: 33.7 % (ref 40.7–50.3)
HEMOGLOBIN: 10.1 G/DL (ref 13–17)
HEMOGLOBIN: 7.8 G/DL (ref 13–17)
IMMATURE GRANULOCYTES: 1 %
LYMPHOCYTES # BLD: 14 % (ref 24–43)
MCH RBC QN AUTO: 24.4 PG (ref 25.2–33.5)
MCHC RBC AUTO-ENTMCNC: 32.4 G/DL (ref 28.4–34.8)
MCV RBC AUTO: 75.3 FL (ref 82.6–102.9)
MONOCYTES # BLD: 8 % (ref 3–12)
PDW BLD-RTO: 14.6 % (ref 11.8–14.4)
PLATELET # BLD: 332 K/UL (ref 138–453)
PLATELET ESTIMATE: ABNORMAL
PMV BLD AUTO: 9.3 FL (ref 8.1–13.5)
POTASSIUM SERPL-SCNC: 3.2 MMOL/L (ref 3.7–5.3)
POTASSIUM SERPL-SCNC: 4.2 MMOL/L (ref 3.7–5.3)
RBC # BLD: 3.2 M/UL (ref 4.21–5.77)
RBC # BLD: ABNORMAL 10*6/UL
SEG NEUTROPHILS: 73 % (ref 36–65)
SEGMENTED NEUTROPHILS ABSOLUTE COUNT: 6.09 K/UL (ref 1.5–8.1)
SODIUM BLD-SCNC: 133 MMOL/L (ref 135–144)
SODIUM BLD-SCNC: 141 MMOL/L (ref 135–144)
VANCOMYCIN TROUGH DATE LAST DOSE: ABNORMAL
VANCOMYCIN TROUGH DOSE AMOUNT: ABNORMAL
VANCOMYCIN TROUGH TIME LAST DOSE: ABNORMAL
VANCOMYCIN TROUGH: 6.6 UG/ML (ref 10–20)
WBC # BLD: 8.3 K/UL (ref 3.5–11.3)
WBC # BLD: ABNORMAL 10*3/UL

## 2017-12-16 PROCEDURE — 99232 SBSQ HOSP IP/OBS MODERATE 35: CPT | Performed by: INTERNAL MEDICINE

## 2017-12-16 PROCEDURE — 6360000002 HC RX W HCPCS: Performed by: STUDENT IN AN ORGANIZED HEALTH CARE EDUCATION/TRAINING PROGRAM

## 2017-12-16 PROCEDURE — 6360000002 HC RX W HCPCS: Performed by: EMERGENCY MEDICINE

## 2017-12-16 PROCEDURE — 85018 HEMOGLOBIN: CPT

## 2017-12-16 PROCEDURE — 2580000003 HC RX 258: Performed by: PODIATRIST

## 2017-12-16 PROCEDURE — 2580000003 HC RX 258: Performed by: STUDENT IN AN ORGANIZED HEALTH CARE EDUCATION/TRAINING PROGRAM

## 2017-12-16 PROCEDURE — 80048 BASIC METABOLIC PNL TOTAL CA: CPT

## 2017-12-16 PROCEDURE — 2580000003 HC RX 258: Performed by: EMERGENCY MEDICINE

## 2017-12-16 PROCEDURE — 3600000004 HC SURGERY LEVEL 4 BASE: Performed by: PODIATRIST

## 2017-12-16 PROCEDURE — 3700000000 HC ANESTHESIA ATTENDED CARE: Performed by: PODIATRIST

## 2017-12-16 PROCEDURE — 6360000002 HC RX W HCPCS: Performed by: NURSE ANESTHETIST, CERTIFIED REGISTERED

## 2017-12-16 PROCEDURE — 80202 ASSAY OF VANCOMYCIN: CPT

## 2017-12-16 PROCEDURE — 36415 COLL VENOUS BLD VENIPUNCTURE: CPT

## 2017-12-16 PROCEDURE — A6266 IMPREG GAUZE NO H20/SAL/YARD: HCPCS | Performed by: PODIATRIST

## 2017-12-16 PROCEDURE — 85014 HEMATOCRIT: CPT

## 2017-12-16 PROCEDURE — 7100000000 HC PACU RECOVERY - FIRST 15 MIN: Performed by: PODIATRIST

## 2017-12-16 PROCEDURE — 87070 CULTURE OTHR SPECIMN AEROBIC: CPT

## 2017-12-16 PROCEDURE — 3700000001 HC ADD 15 MINUTES (ANESTHESIA): Performed by: PODIATRIST

## 2017-12-16 PROCEDURE — 1200000000 HC SEMI PRIVATE

## 2017-12-16 PROCEDURE — 85025 COMPLETE CBC W/AUTO DIFF WBC: CPT

## 2017-12-16 PROCEDURE — A6446 CONFORM BAND S W>=3" <5"/YD: HCPCS | Performed by: PODIATRIST

## 2017-12-16 PROCEDURE — 2500000003 HC RX 250 WO HCPCS: Performed by: PODIATRIST

## 2017-12-16 PROCEDURE — 87076 CULTURE ANAEROBE IDENT EACH: CPT

## 2017-12-16 PROCEDURE — 0Y6T0Z0 DETACHMENT AT RIGHT 3RD TOE, COMPLETE, OPEN APPROACH: ICD-10-PCS | Performed by: PODIATRIST

## 2017-12-16 PROCEDURE — 86403 PARTICLE AGGLUT ANTBDY SCRN: CPT

## 2017-12-16 PROCEDURE — 87075 CULTR BACTERIA EXCEPT BLOOD: CPT

## 2017-12-16 PROCEDURE — 2720000010 HC SURG SUPPLY STERILE: Performed by: PODIATRIST

## 2017-12-16 PROCEDURE — 82947 ASSAY GLUCOSE BLOOD QUANT: CPT

## 2017-12-16 PROCEDURE — 2580000003 HC RX 258: Performed by: NURSE ANESTHETIST, CERTIFIED REGISTERED

## 2017-12-16 PROCEDURE — 7100000001 HC PACU RECOVERY - ADDTL 15 MIN: Performed by: PODIATRIST

## 2017-12-16 PROCEDURE — 99233 SBSQ HOSP IP/OBS HIGH 50: CPT | Performed by: INTERNAL MEDICINE

## 2017-12-16 PROCEDURE — 0Y6R0Z0 DETACHMENT AT RIGHT 2ND TOE, COMPLETE, OPEN APPROACH: ICD-10-PCS | Performed by: PODIATRIST

## 2017-12-16 PROCEDURE — 88311 DECALCIFY TISSUE: CPT

## 2017-12-16 PROCEDURE — 88304 TISSUE EXAM BY PATHOLOGIST: CPT

## 2017-12-16 PROCEDURE — 6360000002 HC RX W HCPCS: Performed by: PODIATRIST

## 2017-12-16 PROCEDURE — 3600000014 HC SURGERY LEVEL 4 ADDTL 15MIN: Performed by: PODIATRIST

## 2017-12-16 PROCEDURE — 87205 SMEAR GRAM STAIN: CPT

## 2017-12-16 PROCEDURE — 6370000000 HC RX 637 (ALT 250 FOR IP): Performed by: STUDENT IN AN ORGANIZED HEALTH CARE EDUCATION/TRAINING PROGRAM

## 2017-12-16 PROCEDURE — 87176 TISSUE HOMOGENIZATION CULTR: CPT

## 2017-12-16 RX ORDER — FENTANYL CITRATE 50 UG/ML
25 INJECTION, SOLUTION INTRAMUSCULAR; INTRAVENOUS EVERY 5 MIN PRN
Status: DISCONTINUED | OUTPATIENT
Start: 2017-12-16 | End: 2017-12-16 | Stop reason: HOSPADM

## 2017-12-16 RX ORDER — MIDAZOLAM HYDROCHLORIDE 1 MG/ML
INJECTION INTRAMUSCULAR; INTRAVENOUS PRN
Status: DISCONTINUED | OUTPATIENT
Start: 2017-12-16 | End: 2017-12-16 | Stop reason: SDUPTHER

## 2017-12-16 RX ORDER — POTASSIUM CHLORIDE 20 MEQ/1
40 TABLET, EXTENDED RELEASE ORAL ONCE
Status: COMPLETED | OUTPATIENT
Start: 2017-12-16 | End: 2017-12-16

## 2017-12-16 RX ORDER — PROPOFOL 10 MG/ML
INJECTION, EMULSION INTRAVENOUS CONTINUOUS PRN
Status: DISCONTINUED | OUTPATIENT
Start: 2017-12-16 | End: 2017-12-16 | Stop reason: SDUPTHER

## 2017-12-16 RX ORDER — FENTANYL CITRATE 50 UG/ML
INJECTION, SOLUTION INTRAMUSCULAR; INTRAVENOUS PRN
Status: DISCONTINUED | OUTPATIENT
Start: 2017-12-16 | End: 2017-12-16 | Stop reason: SDUPTHER

## 2017-12-16 RX ORDER — BUPIVACAINE HYDROCHLORIDE 5 MG/ML
INJECTION, SOLUTION EPIDURAL; INTRACAUDAL PRN
Status: DISCONTINUED | OUTPATIENT
Start: 2017-12-16 | End: 2017-12-16 | Stop reason: HOSPADM

## 2017-12-16 RX ORDER — FENTANYL CITRATE 50 UG/ML
50 INJECTION, SOLUTION INTRAMUSCULAR; INTRAVENOUS EVERY 5 MIN PRN
Status: DISCONTINUED | OUTPATIENT
Start: 2017-12-16 | End: 2017-12-16 | Stop reason: HOSPADM

## 2017-12-16 RX ORDER — SODIUM CHLORIDE 9 MG/ML
INJECTION, SOLUTION INTRAVENOUS CONTINUOUS PRN
Status: DISCONTINUED | OUTPATIENT
Start: 2017-12-16 | End: 2017-12-16 | Stop reason: SDUPTHER

## 2017-12-16 RX ORDER — CALCIUM GLUCONATE 94 MG/ML
2 INJECTION, SOLUTION INTRAVENOUS ONCE
Status: DISCONTINUED | OUTPATIENT
Start: 2017-12-16 | End: 2017-12-16 | Stop reason: CLARIF

## 2017-12-16 RX ADMIN — SODIUM CHLORIDE: 9 INJECTION, SOLUTION INTRAVENOUS at 07:43

## 2017-12-16 RX ADMIN — IRON SUCROSE 500 MG: 20 INJECTION, SOLUTION INTRAVENOUS at 14:07

## 2017-12-16 RX ADMIN — SODIUM CHLORIDE: 9 INJECTION, SOLUTION INTRAVENOUS at 07:59

## 2017-12-16 RX ADMIN — TAZOBACTAM SODIUM AND PIPERACILLIN SODIUM 3.38 G: 375; 3 INJECTION, SOLUTION INTRAVENOUS at 21:24

## 2017-12-16 RX ADMIN — FENTANYL CITRATE 50 MCG: 50 INJECTION INTRAMUSCULAR; INTRAVENOUS at 07:51

## 2017-12-16 RX ADMIN — MIDAZOLAM HYDROCHLORIDE 2 MG: 1 INJECTION, SOLUTION INTRAMUSCULAR; INTRAVENOUS at 07:51

## 2017-12-16 RX ADMIN — TAZOBACTAM SODIUM AND PIPERACILLIN SODIUM 3.38 G: 375; 3 INJECTION, SOLUTION INTRAVENOUS at 05:13

## 2017-12-16 RX ADMIN — CALCIUM GLUCONATE 2 G: 94 INJECTION, SOLUTION INTRAVENOUS at 10:56

## 2017-12-16 RX ADMIN — POTASSIUM CHLORIDE 40 MEQ: 20 TABLET, EXTENDED RELEASE ORAL at 10:55

## 2017-12-16 RX ADMIN — INSULIN LISPRO 2 UNITS: 100 INJECTION, SOLUTION INTRAVENOUS; SUBCUTANEOUS at 16:43

## 2017-12-16 RX ADMIN — FENTANYL CITRATE 50 MCG: 50 INJECTION INTRAMUSCULAR; INTRAVENOUS at 08:00

## 2017-12-16 RX ADMIN — Medication 10 ML: at 20:49

## 2017-12-16 RX ADMIN — TAZOBACTAM SODIUM AND PIPERACILLIN SODIUM 3.38 G: 375; 3 INJECTION, SOLUTION INTRAVENOUS at 13:04

## 2017-12-16 RX ADMIN — PROPOFOL 50 MCG/KG/MIN: 10 INJECTION, EMULSION INTRAVENOUS at 07:52

## 2017-12-16 RX ADMIN — VANCOMYCIN HYDROCHLORIDE 1500 MG: 1 INJECTION, POWDER, LYOPHILIZED, FOR SOLUTION INTRAVENOUS at 21:24

## 2017-12-16 ASSESSMENT — PULMONARY FUNCTION TESTS
PIF_VALUE: 0
PIF_VALUE: 1
PIF_VALUE: 0

## 2017-12-16 ASSESSMENT — PAIN SCALES - GENERAL
PAINLEVEL_OUTOF10: 0
PAINLEVEL_OUTOF10: 0

## 2017-12-16 NOTE — PROGRESS NOTES
oriented, normal speech, no focal findings or movement disorder noted  Lungs:  clear to auscultation, no wheezes, rales or rhonchi, symmetric air entry  Heart[de-identified] normal rate, regular rhythm, normal S1, S2, no murmurs, rubs, clicks or gallops  Abdomen:  soft, nontender, nondistended, no masses or organomegaly  Extremities: Gangrenous digits right digits 2 and 3, left digit 2.  Pedal pulses audible on doppler       Medications:   Scheduled Medications   calcium gluconate IVPB  2 g Intravenous Once    [MAR Hold] insulin lispro  0-12 Units Subcutaneous TID WC    [MAR Hold] insulin lispro  0-6 Units Subcutaneous Nightly    [MAR Hold] sodium hypochlorite   Irrigation Daily    [MAR Hold] vancomycin (VANCOCIN) intermittent dosing (placeholder)   Other RX Placeholder    [MAR Hold] vancomycin  1,500 mg Intravenous Q24H    [MAR Hold] aspirin EC  81 mg Oral Daily    [MAR Hold] atorvastatin  20 mg Oral Daily    [MAR Hold] sodium chloride flush  10 mL Intravenous 2 times per day    [MAR Hold] piperacillin-tazobactam  3.375 g Intravenous Q8H       PRN Medications    fentanNYL 25 mcg Q5 Min PRN   fentanNYL 50 mcg Q5 Min PRN   bupivacaine (PF)  PRN   [MAR Hold] ammonium lactate  PRN   [MAR Hold] docusate sodium 100 mg BID PRN   [MAR Hold] ondansetron 4 mg Q8H PRN   [MAR Hold] povidone-iodine  PRN   [MAR Hold] sodium chloride flush 10 mL PRN   [MAR Hold] acetaminophen 650 mg Q4H PRN   [MAR Hold] magnesium hydroxide 30 mL Daily PRN   [MAR Hold] glucose 15 g PRN   [MAR Hold] dextrose 12.5 g PRN   [MAR Hold] glucagon (rDNA) 1 mg PRN   [MAR Hold] dextrose 100 mL/hr PRN   [MAR Hold] potassium chloride 40 mEq PRN   Or     [MAR Hold] potassium chloride 40 mEq PRN   Or     [MAR Hold] potassium chloride 10 mEq PRN       Diagnostic Labs and Imaging    CBC:  Recent Labs      12/14/17   0828  12/15/17   1650  12/16/17   0633   WBC  13.6*  12.9*  8.3   HGB  9.9*  10.1*  7.8*   PLT  410  452  332     BMP:   Recent Labs      12/14/17 0828 12/15/17   0921  12/16/17   0633   NA  134*  134*  141   K  4.0  4.3  3.2*   CL  97*  99  111*   CO2  23  21  18*   BUN  17  12  8   CREATININE  1.18  1.22*  0.73   GLUCOSE  130*  248*  103*     Hepatic:   Recent Labs      12/13/17   1457   AST  29   ALT  38   BILITOT  0.43   ALKPHOS  81     INR: No results for input(s): INR in the last 72 hours. Troponin: No results for input(s): TROPONINI in the last 72 hours. Assessment and Plan:   Principal Problem:    Diabetic gangrene ([facility]  Active Problems:    Type 2 diabetes mellitus with circulatory disorder (HCC)    Hypertension    Hyponatremia    Microcytic anemia    Gangrene of right foot (HCC)    MRSA (methicillin resistant Staphylococcus aureus) infection    Osteomyelitis of right foot (HCC)    -IV fluids 100ml/hr  -Medium dose insulin sliding scale.   -Continue home blood pressure medications  -Amputation of right digits 2 and 3 right foot today per podiatry   -Potassium 40m/eq replacement, Calcium 2gm replacement. F/up BMP after replacement   -Iron 500mg IV x 2  -ID -continue Vancomycin 1.5gm q24hr, Zosyn 3.3 q8hr. Wander Rosas, PGY-1  Internal Medicine Department  [place], [place], [place]      Attending Physician Statement  I have discussed the care of Alecia Shen, including pertinent history and exam findings with the resident. I have reviewed the key elements of all parts of the encounter with the resident. I have seen and examined the patient with the resident. I agree with the assessment and plan and status of the problem list as documented. He was seen after he returned from amputation of his second and third toes by podiatry. He was easily arousable on room air denies any shortness of breath and/or pain. His blood sugar is 136 this morning and potassium was 3.2 and he was getting replacement he was on IV fluids and cause he was nothing by mouth.   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 Will restart iron IV. We will recheck hemoglobin and if there is drop in hemoglobin and will follow-up hemoglobin and hematocrit. We will continue to monitor blood sugar and for now insulin sliding scale as he had prolonged hypoglycemia from oral hypoglycemic if his blood sugar is on the higher side once he started eating then will resume oral hypoglycemic medication.   On vancomycin and Zosyn and we will continue follow-up infectious disease and follow-up cultures sent with surgery      Ji Borden MD  12/16/2017 2:25 PM

## 2017-12-16 NOTE — PROGRESS NOTES
[facility] - Progress Note  Today's Date and Time: 12/16/2017, 11:53 AM    Impression :   1. Gangrene of 2nd and third toes of the right foot s/p amputation 12/15/17  2. Superficial Gangrene of distal aspect 2nd left toe. 3. MRSA infection  4. DM 2  5. PAD  6. HTN    Recommendations   1. Continue Vancomycin 1.5 gm q 24h  2. Continue Zosyn 3.3 g q8 h    Chief complaint/reason for consultation:   Management for gangrene on right and left toes    History of Present Illness:   INITIAL HISTORY:    Rosalino Salvador is a 69y.o.-year-old  male who was initially admitted on 12/13/2017. The patient presented to the ED with a complaint of leg swelling and gangrene of his toes. Two weeks ago, the patient noticed the swelling and toe changes. Since he already had an appointment scheduled with Podiatry on 12/13/2017 to follow up his right foot wound, he decided to wait until then to get his feet examined. The patient was sent to the ED directly from the clinic because of the extent of the apparent gangrene. On 12/6/17, the patient visited the Podiatric clinic for a Diabetic foot examination. A few days prior, the patient noticed wounds on his right foot and drainage in his shoes. He was given Doxycycline for 10 days and referred to Vascular Surgery for PVRs. A follow up visit was scheduled for 12/13/2017. On 12/11/17, arterial PVR showed mild-moderate vascular insufficiency at rest bilaterally. 12/11 foot XR showed new bony destruction of 2nd and 3rd toes with stable partial destruction of the 1st toe. In related Past HX: In 2015, the patient was admitted for left toe osteomyelitis and foot blisters, which were treated with Doxycycline and Cipro. In 2016, he was admitted under observation for right foot ulcerations. The wounds were debrided and cultured. The Wound cultures were positive for MRSA.  He was started on Doxycycline for 10 days and instructed vision  ENT: No sore throat or runny nose. No hearing loss. Cardiovascular: No chest pain or palpitations. No dyspnea on exertion  Lung: Denied SOB. Abdomen: No nausea, vomiting, diarrhea. No abdominal pain or cramping  Genitourinary: No increased urinary frequency, or dysuria. Musculoskeletal: Negative for drainage. Negative for pain on palpation. S/p amputation  Hematologic: No bruising or bleeding  Neurologic: No headache, weakness, numbness, or tingling. Skin: no rashes    Physical Examination : 2017     Patient Vitals for the past 8 hrs:   BP Temp Temp src Pulse Resp SpO2   17 1000 - - - 58 - -   17 0907 128/72 - - 67 14 99 %   17 0903 - - - 62 14 97 %   17 0900 131/72 98.2 °F (36.8 °C) Tympanic 64 13 98 %   17 0850 - - - 73 - -   17 0847 (!) 118/58 - - 72 15 97 %   17 0845 (!) 118/58 - - 76 16 95 %   17 0837 (!) 110/56 98.2 °F (36.8 °C) Tympanic 88 18 100 %     Temp (24hrs), Av.1 °F (36.7 °C), Min:97.1 °F (36.2 °C), Max:99.1 °F (37.3 °C)      General Appearance: Awake, alert, in no apparent distress. Mental status: Good mentation  Head:  Normocephalic, no trauma  ENT: sclera anicteric. Moist mucosa. No thrush or oral lesions. Poor dentition  Neck: Supple, no cervical lymphadenopathy  Pulmonary: Clear lung fields on auscultation, No wheezes, rales, or rhonchi. Cardiovascular: RRR, no murmurs  Abdomen: Soft, non tender, non distended. +BS   Extremities: Warm, decreased perfusion. Right LE edema. S/p right 2nd/3rd digit amputation- dressing in place. +Left foot: the tip of the 2nd digit appeared black and malodorous. Negative for drainage. Neurologic: Decreased sensation in distal lower extremity digits. Skin: No rashes or other lesions.   IV site w/o significant erythema    Medical Decision Making:   Labs    CBC with Differential:   Recent Labs      12/15/17   1650  17   0633   WBC  12.9*  8.3   HGB  10.1*  7.8*   HCT  32.7*  24.1*

## 2017-12-16 NOTE — ANESTHESIA PRE PROCEDURE
Provider, MD   glipiZIDE (GLUCOTROL) 10 MG tablet  11/4/11   Historical Provider, MD Bharath Long 1213 TEST strip  11/4/11   Historical Provider, MD   lisinopril-hydrochlorothiazide (PRINZIDE;ZESTORETIC) 20-12.5 MG per tablet  9/6/11   Historical Provider, MD   metformin (GLUCOPHAGE) 1000 MG tablet  11/4/11   Historical Provider, MD       Current medications:    Current Facility-Administered Medications   Medication Dose Route Frequency Provider Last Rate Last Dose    insulin lispro (HUMALOG) injection vial 0-12 Units  0-12 Units Subcutaneous TID WC Jordon Fonseca DPM   4 Units at 12/15/17 0855    insulin lispro (HUMALOG) injection vial 0-6 Units  0-6 Units Subcutaneous Nightly Jordon Fonseca DPM   2 Units at 12/15/17 2143    sodium hypochlorite (DAKINS) external solution   Irrigation Daily Al Eastern, DPM        vancomycin [facility]) intermittent dosing (placeholder)   Other RX Placeholder Henrique Diehl DO        vancomycin (VANCOCIN) 1,500 mg in dextrose 5 % 250 mL IVPB  1,500 mg Intravenous Q24H Henrique Diehl DO   Stopped at 12/16/17 0027    ammonium lactate (AMLACTIN) 12 % cream   Topical PRN Jordon Fonseca DPM        aspirin EC tablet 81 mg  81 mg Oral Daily ANGELA BarbaM   81 mg at 12/15/17 0854    docusate sodium (COLACE) capsule 100 mg  100 mg Oral BID PRN Jordon Fonseca DPM        atorvastatin (LIPITOR) tablet 20 mg  20 mg Oral Daily ANGELA BarbaM   20 mg at 12/15/17 0854    ondansetron (ZOFRAN) tablet 4 mg  4 mg Oral Q8H PRN Jordon Fonseca DPM        povidone-iodine (BETADINE) 10 % external solution   Topical PRN Jordon Fonseca DPM        sodium chloride flush 0.9 % injection 10 mL  10 mL Intravenous 2 times per day Jordon Fonseca DPM        sodium chloride flush 0.9 % injection 10 mL  10 mL Intravenous PRN Jordon Fonseca, DPM        acetaminophen (TYLENOL) tablet 650 mg  650 mg Oral Q4H PRN Jordon Fonseca DPM        magnesium hydroxide (MILK OF MAGNESIA) 400 MG/5ML suspension 30 mL 30 mL Oral Daily PRN Malika Ovlera, DPM        piperacillin-tazobactam (ZOSYN) 3.375 g in dextrose 50 mL IVPB (premix)  3.375 g Intravenous Q8H Malika Olvera, DPM   Stopped at 12/16/17 0602    glucose (GLUTOSE) 40 % oral gel 15 g  15 g Oral PRN Malika Olvera, DPM   15 g at 12/13/17 2113    dextrose 50 % solution 12.5 g  12.5 g Intravenous PRN Malika Olvera, DPM   12.5 g at 12/14/17 1245    glucagon (rDNA) injection 1 mg  1 mg Intramuscular PRN Malika Olvera, DPM        dextrose 5 % solution  100 mL/hr Intravenous PRN Malika Olvera DPM        0.9 % sodium chloride infusion   Intravenous Continuous Milton Rahman  mL/hr at 12/15/17 0300      potassium chloride (KLOR-CON M) extended release tablet 40 mEq  40 mEq Oral PRN Milton Rahman MD        Or    potassium chloride 20 MEQ/15ML (10%) oral solution 40 mEq  40 mEq Oral PRN Milton Rahman MD        Or    potassium chloride 10 mEq/100 mL IVPB (Peripheral Line)  10 mEq Intravenous PRN Milton Rahman MD           Allergies:  No Known Allergies    Problem List:    Patient Active Problem List   Diagnosis Code    Diabetes mellitus ([facility] [place] [address] E11.9    High blood pressure I10    Onychomycosis B35.1    Diabetic gangrene ([facility] [place] [address] E11.52    Type 2 diabetes mellitus with circulatory disorder (HCC) E11.59    Hypertension I10    Hyponatremia E87.1    Microcytic anemia D50.9    Gangrene of right foot ([facility]) I96    MRSA (methicillin resistant Staphylococcus aureus) infection A49.02    Osteomyelitis of right foot ([facility] [place] [address] M86.9       Past Medical History:        Diagnosis Date    Hypertension     MRSA (methicillin resistant staph aureus) culture positive 07/01/2016    foot    Type II or unspecified type diabetes mellitus without mention of complication, not stated as uncontrolled        Past Surgical History:  History reviewed. No pertinent surgical history.     Social History:    Social History   Substance Use Topics    Smoking status: Never Smoker    Smokeless tobacco: exam  breath sounds clear to auscultation                             Cardiovascular:    (+) hypertension:,                   Neuro/Psych:   Negative Neuro/Psych ROS              GI/Hepatic/Renal:        GERD: pt denies.        Endo/Other:    (+) Type II DM, , .                 Abdominal:           Vascular:                                        Anesthesia Plan      TIVA, MAC and general     ASA 3                                 Mich Barber CRNA   12/16/2017

## 2017-12-16 NOTE — PROGRESS NOTES
Report called to Carlos Sandoval on [facility]. Pt transferred to room 235 via bed with belongings.

## 2017-12-16 NOTE — PROGRESS NOTES
Dermatologic: Skin is noted to be warm, dry, and xerotic, Bilateral. Black necrotic tissue ensheathing Right 2nd and 3rd digit with brown purulent drainage. Hal mal odor. Probes approximately 2cm to bone beyond level of MPJ. No crepitus noted to midfoot. No proximal streaking or palpable cords. Left 2nd digit with hard dark discolored skin present to tip. Labs:  Lab Results   Component Value Date    WBC 8.3 12/16/2017    HGB 7.8 (L) 12/16/2017    HCT 24.1 (L) 12/16/2017     12/16/2017    LYMPHOPCT 14 (L) 12/16/2017    MONOPCT 8 12/16/2017    BASOPCT 1 12/16/2017     Lab Results   Component Value Date     12/16/2017    K 3.2 (L) 12/16/2017     (H) 12/16/2017    CO2 18 (L) 12/16/2017    BUN 8 12/16/2017    CREATININE 0.73 12/16/2017    GLUCOSE 103 (H) 12/16/2017    CALCIUM 5.4 (LL) 12/16/2017     Lab Results   Component Value Date    CALCIUM 5.4 (LL) 12/16/2017     Imaging:   XR Foot Right 12/15/17:  Impression   1. Extensive soft tissue gas throughout the forefoot primarily about the 2nd   and 3rd digits.  Gas gangrene or necrotizing infection is not excluded on the   basis of this study. 2. Stable partial destruction of the distal phalanx of the 1st digit with   soft tissue swelling of the right great toe an overlying ulceration. Osteomyelitis is difficult to exclude. 3. Interval amputation of the 2nd and 3rd digits at the level of the DIP   joints. 4. Stable mild nonspecific cortical thickening along the 4th metatarsal   diaphysis.        ASSESSMENT:     Patient is a 69 y.o. male with    - Wet gangrene Right 2nd and 3rd digits s/p partial toe amputations at bedside   - Early evidence dry gangrene noted to Left 2nd digit   - PAD s/p angio per Dr. Deepak Pearce (DOS 12/14/17)   - Diabetes mellitus with peripheral neuropathy   - Hypocalcemia    - Hypokalemia    Patient Active Problem List   Diagnosis    Diabetes mellitus ([address]    High blood pressure    Onychomycosis    [facility] [address]    Type 2 diabetes mellitus with circulatory disorder (HCC)    Hypertension    Hyponatremia    Microcytic anemia    Gangrene of right foot (HCC)    MRSA (methicillin resistant Staphylococcus aureus) infection    Osteomyelitis of right foot ([facility] [address]       PLAN:     Patient examined and evaluated. Labs and imaging reviewed  Appreciate vascular recs to proceed with toe amputations  IV vanc/zosyn per ID  Written surgical consent obtained to amputate Right 2nd and 3rd digits along with all remaining infected soft tissue and bone. Surgery scheduled for 7:40AM    AC held this AM   NPO since midnight confirmed   Right foot marked  Dressings left intact to Right foot in anticipation of surgery. Plan to obtain intra-op cultures. Medical management per IM  Discussed with Dr. Vy Ramos.     Electronically signed by Hanna Rhodes DPM on 12/16/2017 at 7:40 AM

## 2017-12-16 NOTE — PROGRESS NOTES
Physical Therapy  DATE: 2017    NAME: Eugenio Webb  MRN: [id number]   : 1950    Patient not seen this date for Physical Therapy due to:  [] Blood transfusion in progress  [] Hemodialysis  []  Patient Declined  [] Spine Precautions   [] Strict Bedrest  [x] Surgery/ Procedure  Rt 2nd and 3rd toe amputation  [] Testing      [] Other        [] PT being discontinued at this time. Patient independent. No further needs. [] PT being discontinued at this time as the patient has been transferred to palliative care. No further needs.     Daniel Miles, PT

## 2017-12-17 LAB
ABSOLUTE EOS #: 0.42 K/UL (ref 0–0.44)
ABSOLUTE IMMATURE GRANULOCYTE: 0.09 K/UL (ref 0–0.3)
ABSOLUTE LYMPH #: 1.7 K/UL (ref 1.1–3.7)
ABSOLUTE MONO #: 0.83 K/UL (ref 0.1–1.2)
ANION GAP SERPL CALCULATED.3IONS-SCNC: 13 MMOL/L (ref 9–17)
BASOPHILS # BLD: 1 % (ref 0–2)
BASOPHILS ABSOLUTE: 0.06 K/UL (ref 0–0.2)
BUN BLDV-MCNC: 14 MG/DL (ref 8–23)
BUN/CREAT BLD: ABNORMAL (ref 9–20)
CALCIUM SERPL-MCNC: 7.9 MG/DL (ref 8.6–10.4)
CHLORIDE BLD-SCNC: 99 MMOL/L (ref 98–107)
CO2: 22 MMOL/L (ref 20–31)
CREAT SERPL-MCNC: 1.01 MG/DL (ref 0.7–1.2)
DIFFERENTIAL TYPE: ABNORMAL
EOSINOPHILS RELATIVE PERCENT: 4 % (ref 1–4)
GFR AFRICAN AMERICAN: >60 ML/MIN
GFR NON-AFRICAN AMERICAN: >60 ML/MIN
GFR SERPL CREATININE-BSD FRML MDRD: ABNORMAL ML/MIN/{1.73_M2}
GFR SERPL CREATININE-BSD FRML MDRD: ABNORMAL ML/MIN/{1.73_M2}
GLUCOSE BLD-MCNC: 112 MG/DL (ref 75–110)
GLUCOSE BLD-MCNC: 144 MG/DL (ref 70–99)
GLUCOSE BLD-MCNC: 164 MG/DL (ref 75–110)
GLUCOSE BLD-MCNC: 169 MG/DL (ref 75–110)
GLUCOSE BLD-MCNC: 213 MG/DL (ref 75–110)
HCT VFR BLD CALC: 32.6 % (ref 40.7–50.3)
HEMOGLOBIN: 10.2 G/DL (ref 13–17)
IMMATURE GRANULOCYTES: 1 %
LYMPHOCYTES # BLD: 16 % (ref 24–43)
MCH RBC QN AUTO: 24.3 PG (ref 25.2–33.5)
MCHC RBC AUTO-ENTMCNC: 31.3 G/DL (ref 28.4–34.8)
MCV RBC AUTO: 77.6 FL (ref 82.6–102.9)
MONOCYTES # BLD: 8 % (ref 3–12)
PDW BLD-RTO: 14.9 % (ref 11.8–14.4)
PLATELET # BLD: 480 K/UL (ref 138–453)
PLATELET ESTIMATE: ABNORMAL
PMV BLD AUTO: 9.4 FL (ref 8.1–13.5)
POTASSIUM SERPL-SCNC: 4.3 MMOL/L (ref 3.7–5.3)
RBC # BLD: 4.2 M/UL (ref 4.21–5.77)
RBC # BLD: ABNORMAL 10*6/UL
SEG NEUTROPHILS: 70 % (ref 36–65)
SEGMENTED NEUTROPHILS ABSOLUTE COUNT: 7.86 K/UL (ref 1.5–8.1)
SODIUM BLD-SCNC: 134 MMOL/L (ref 135–144)
WBC # BLD: 11 K/UL (ref 3.5–11.3)
WBC # BLD: ABNORMAL 10*3/UL

## 2017-12-17 PROCEDURE — 1200000000 HC SEMI PRIVATE

## 2017-12-17 PROCEDURE — 6370000000 HC RX 637 (ALT 250 FOR IP): Performed by: STUDENT IN AN ORGANIZED HEALTH CARE EDUCATION/TRAINING PROGRAM

## 2017-12-17 PROCEDURE — 85025 COMPLETE CBC W/AUTO DIFF WBC: CPT

## 2017-12-17 PROCEDURE — 2580000003 HC RX 258: Performed by: STUDENT IN AN ORGANIZED HEALTH CARE EDUCATION/TRAINING PROGRAM

## 2017-12-17 PROCEDURE — 6360000002 HC RX W HCPCS: Performed by: STUDENT IN AN ORGANIZED HEALTH CARE EDUCATION/TRAINING PROGRAM

## 2017-12-17 PROCEDURE — 80048 BASIC METABOLIC PNL TOTAL CA: CPT

## 2017-12-17 PROCEDURE — 6360000002 HC RX W HCPCS: Performed by: EMERGENCY MEDICINE

## 2017-12-17 PROCEDURE — 82947 ASSAY GLUCOSE BLOOD QUANT: CPT

## 2017-12-17 PROCEDURE — 36415 COLL VENOUS BLD VENIPUNCTURE: CPT

## 2017-12-17 PROCEDURE — 99232 SBSQ HOSP IP/OBS MODERATE 35: CPT | Performed by: INTERNAL MEDICINE

## 2017-12-17 PROCEDURE — 94762 N-INVAS EAR/PLS OXIMTRY CONT: CPT

## 2017-12-17 PROCEDURE — 2580000003 HC RX 258: Performed by: EMERGENCY MEDICINE

## 2017-12-17 RX ORDER — LISINOPRIL AND HYDROCHLOROTHIAZIDE 20; 12.5 MG/1; MG/1
1 TABLET ORAL DAILY
Status: DISCONTINUED | OUTPATIENT
Start: 2017-12-17 | End: 2017-12-21 | Stop reason: HOSPADM

## 2017-12-17 RX ORDER — GLIMEPIRIDE 2 MG/1
2 TABLET ORAL
Status: DISCONTINUED | OUTPATIENT
Start: 2017-12-17 | End: 2017-12-20

## 2017-12-17 RX ADMIN — TAZOBACTAM SODIUM AND PIPERACILLIN SODIUM 3.38 G: 375; 3 INJECTION, SOLUTION INTRAVENOUS at 22:00

## 2017-12-17 RX ADMIN — Medication 10 ML: at 19:55

## 2017-12-17 RX ADMIN — VANCOMYCIN HYDROCHLORIDE 1250 MG: 1 INJECTION, POWDER, LYOPHILIZED, FOR SOLUTION INTRAVENOUS at 08:09

## 2017-12-17 RX ADMIN — TAZOBACTAM SODIUM AND PIPERACILLIN SODIUM 3.38 G: 375; 3 INJECTION, SOLUTION INTRAVENOUS at 07:03

## 2017-12-17 RX ADMIN — TAZOBACTAM SODIUM AND PIPERACILLIN SODIUM 3.38 G: 375; 3 INJECTION, SOLUTION INTRAVENOUS at 14:12

## 2017-12-17 RX ADMIN — IRON SUCROSE 500 MG: 20 INJECTION, SOLUTION INTRAVENOUS at 08:10

## 2017-12-17 RX ADMIN — VANCOMYCIN HYDROCHLORIDE 1250 MG: 1 INJECTION, POWDER, LYOPHILIZED, FOR SOLUTION INTRAVENOUS at 19:55

## 2017-12-17 RX ADMIN — GLIMEPIRIDE 2 MG: 2 TABLET ORAL at 11:49

## 2017-12-17 RX ADMIN — LISINOPRIL AND HYDROCHLOROTHIAZIDE 1 TABLET: 12.5; 2 TABLET ORAL at 11:49

## 2017-12-17 RX ADMIN — ATORVASTATIN CALCIUM 20 MG: 20 TABLET, FILM COATED ORAL at 08:09

## 2017-12-17 RX ADMIN — ASPIRIN 81 MG: 81 TABLET, COATED ORAL at 08:10

## 2017-12-17 ASSESSMENT — PAIN SCALES - GENERAL: PAINLEVEL_OUTOF10: 0

## 2017-12-17 NOTE — OP NOTE
was started on IV vancomycin and  Zosyn. Vascular was consulted and they performed an angioplasty on  Thursday evening, Thursday being the 12/14. So Podiatry scheduled him for  surgery today on the 16th and preop all risks, rewards and alternative  treatments were discussed in length prior to the patient signing the  consent form, which was witnessed by a nurse and placed in the chart. Labs  and imaging were reviewed. Antibiotics were already given on the floor. The right foot was marked. No guarantees were given or implied. OPERATIVE PROCEDURE:  The patient was brought to the operating room and  placed on the operating table in the supine position. Pneumatic ankle  tourniquet was placed on the right just in case. Following an IV sedation  of MAC, a preoperative local block was performed consisting of 20 mL of 1:1  mixture of 0.5% Marcaine plain and 1% lidocaine plain. The right foot was  then prepped, scrubbed and draped in the usual aseptic manner. I should  state the tourniquet was not inflated. The attention was then directed to  the right foot where utilizing #15-blade the second and third digits were  excised from the foot to the level of the metatarsophalangeal joint taking  care to maintain viable skin edges for closure down the road. The toes  were passed from operative field to be sent to pathology and microbiology. Residual necrotic tissue was removed with combination of rongeur and  15-blade pickups. The operative site was irrigated with 3 liters of  sterile saline under Pulsavac pressure. There was tracking noted to be 4  cm dorsal along the extensor tendons; however, no purulence was  exsanguinated along the dorsum of the foot. Mal odor was moderate. After  the debridement and irrigation, the operative site was packed with  quarter-inch iodoform gauze and covered with saline moistened gauze. Light  Ace was applied to secure the postop dressing.   The patient tolerated the  procedure well with capillary refill time less than 5 seconds to remaining  pedal digits. The vital signs were stable. I should note that throughout  the dissection, the tourniquet was not inflated and bleeding was minimal  less than 10 mL. The patient was transferred back to PACU where following  routine monitoring, he will be transferred to his inpatient bed where  Podiatry will see him in the morning and change his dressing.         April Samy    D: 12/16/2017 16:30:04       T: 12/16/2017 20:45:19     [email or web address]  Job#: [id number]     Doc#: [id number]    CC:

## 2017-12-17 NOTE — PROGRESS NOTES
Podiatry Inpatient Progress Note  POD#1 amputation R2&3 digits for wet gangrene and OM; early ischemia L2 digit    SUBJECTIVE:     Current Evaluation:  Patient seen and evaluated at bedside. Patient denies pain to Right foot. Asking if he is allowed to walk to heel in surgical shoe. Producing urine. Afebrile. WBC 11. Currently denies F/C/N/V.     HPI:  Chad Posadas is a 69 y.o. male diabetic admitted on 12/13 with dry gangrene to Right 2nd and 3rd digit and ischemic changes to Left foot notably Left 2nd digit. Patient established with podiatry in [facility] outpatient clinic; last seen on 12/13 at which point wounds were noted to be worsening. PVR PPG completed on 12/11 were abnormal with flat line waveforms. Vascular Dr. Leslie Young completed angio yesterday evening and today advised that patient had adequate perfusion for toe amputation. Patient has meanwhile been treated with IV antibiotics by ID. Today patient states he is amenable to amputation of digits. PHYSICAL EXAM:     Vitals:   Vitals:    12/17/17 0837   BP: (!) 157/66   Pulse: 63   Resp: 16   Temp: 98.3 °F (36.8 °C)   SpO2: 100%     General: AAO x 3 in NAD.      Lower Extremity Physical Exam:     Vascular: DP/PT pulses present on doppler, Bilateral. CFT <3 seconds to Right digits 1,4,5 and Left digits 1,3-5. Edema present to Left foot. No varicosities. Hair growth absent to lower extremities, Bilateral.     Neurological: Protective sensation absent at all 10 pedal sites as tested with [place] Monofilament 5.07, Bilateral.  Sharp/dull discrimination absent, Bilateral.     Musculoskeletal: Muscle strength 5/5 for all lower extremity muscle groups, Bilateral.  S/p amputation R2&3 digits.      Dermatologic: Skin is noted to be warm, dry, and xerotic, Bilateral. Surgical wound to Left foot with metatarsal heads 2&3 exposed. No purulence or mal odor. Wound base is fibrogranular with serosanguinous drainage. Wound edges appear viable. 2nd and 3rd digits    - Early evidence ischemia noted to Left 2nd digit   - PAD s/p angio per Dr. Jitendra Cabrera (DOS 12/14/17)   - Diabetes mellitus with peripheral neuropathy   - Hypocalcemia    - Hypokalemia    Patient Active Problem List   Diagnosis    Diabetes mellitus ([facility] [address]    High blood pressure    Onychomycosis    Diabetic gangrene ([facility] [address]    Type 2 diabetes mellitus with circulatory disorder (HCC)    Hypertension    Hyponatremia    Microcytic anemia    Gangrene of right foot (HCC)    MRSA (methicillin resistant Staphylococcus aureus) infection    Osteomyelitis of right foot ([facility] [address]       PLAN:     Patient examined and evaluated. Labs and imaging reviewed  Appreciate vascular recs to proceed with toe amputations  Appreciate ID recs, IV vanc/zosyn - will need abx recs for homegoing; intra-op cultures pending  Appreciate IM for medical management  Irrigated Left foot wound with >200cc sterile saline and packed with 1/4\" iodoform strips covered with saline WTD and LIGHT ACE  OK to bear weight to Left heel in surgical shoe  Tentative plan for wound vac application on Monday or Tuesday vs delay primary closure in OR   Discussed with Dr. Solange Napier.     Electronically signed by Darling June DPM on 12/17/2017 at 10:50 AM

## 2017-12-17 NOTE — PROGRESS NOTES
Pharmacy Vancomycin Consult     Vancomycin Day: 4  Current Dosin mg IV every 24 hours. Temp max:  100.2    Recent Labs      17   0633  17   1414   BUN  8  12       Recent Labs      17   0633  17   1414   CREATININE  0.73  1.14       Recent Labs      12/15/17   1650  17   0633   WBC  12.9*  8.3         Intake/Output Summary (Last 24 hours) at 17 2243  Last data filed at 17 1647   Gross per 24 hour   Intake 3525 ml   Output 1934 ml   Net 1591 ml       Culture Date      Source                       Results      Ht Readings from Last 1 Encounters:   17 5' 10.87\" (1.8 m)        Wt Readings from Last 1 Encounters:   17 195 lb (88.5 kg)         Body mass index is 27.3 kg/m². Estimated Creatinine Clearance: 65 mL/min (based on SCr of 1.14 mg/dL). Trough: 6.6    Assessment/Plan:  Change dosing regimen to 1250 mg IV every 12 hours. Pharmacy will continue to follow.     Gloria Espinoza [place]  2017 10:46 PM

## 2017-12-17 NOTE — PLAN OF CARE
Problem: Pain:  Goal: Pain level will decrease  Pain level will decrease  Outcome: Ongoing  Medicated for pain as needed

## 2017-12-17 NOTE — PROGRESS NOTES
instructed on dressing changes. On current examination, the patient was resting comfortably and not in acute distress. He denied any trauma to his feet. He denied any foot pain. Right foot: 2nd and 3rd digits appeared black and malodorous. There was no drainage at the site. No pain on palpation. Left foot: the tip of the 2nd digit appeared black and malodorous. Negative for drainage. Negative for pain on palpation. WBC 17.9 -->13.6    CURRENT EVALUATION : 12/17/2017    The patient was seen at bedside this morning. No acute distress. POD #1 s/p 2nd and 3rd digit amputation of the right foot. No post-op complications thus far. He denied any nausea/vomiting/ SOB, chest pain, fever/chills    Afebrile  VS stable    WBC 12.9 --> 8.3 -->11.0    Cultures:  Wound: No growth. Rare neutrophils    Discussion    The patient has progressive PAD and has been following with Podiatry for many years. Current admission with superficial dry gangrene of Lt 2nd toe and tissue necrosis and gangrene of Rt 2nd and third toes. The left second toe can be treated with local care. Vascular recommended to proceed with Rt foot toe amputations. Patient had right 2nd/3rd digit amputation per Podiatry on 12/16/17. Plan  · Continue Vancomycin 1.5 gm q 24h  · Continue Zosyn 3. 3 g q8 h       Discussed with patient, RN. I have personally reviewed the past medical history, past surgical history, medications, social history, and family history, and I have updated the database accordingly. Past Medical History:     Past Medical History:   Diagnosis Date    Hypertension     MRSA (methicillin resistant staph aureus) culture positive 07/01/2016    foot    Type II or unspecified type diabetes mellitus without mention of complication, not stated as uncontrolled        Past Surgical  History:   History reviewed. No pertinent surgical history.     Medications:      lisinopril-hydrochlorothiazide  1 tablet Oral Lower extremity Doppler 12/11/2017  Summary        Bilateral lower extremity ABIs and PVRs (with toe pressures ) were    performed for the evaluation of peripheral vascular disease. Study    demonstrates:        ABNORMAL        Right:    Mild to moderate vascular insufficiency at rest.        Left:    Mild to moderate vascular insufficiency at rest.     Lower extremity Doppler 9/26/2016   Summary        Normal PVR at rest of the bilateral lower extremity.    Normal PVR with exercise of the bilateral lower extremity.    Normal arterial PPG waveforms of the bilateral digits.    Severe small vessel and/or vasospastic disease of the left 2,3 digits. .................................................................................................................................... Thank you for allowing us to participate in the care of this patient. Please do not hesitate to call with questions.     Gregoria Posadas MD  12/17/2017

## 2017-12-18 PROBLEM — E11.52 TYPE 2 DIABETES MELLITUS WITH DIABETIC PERIPHERAL ANGIOPATHY AND GANGRENE, WITHOUT LONG-TERM CURRENT USE OF INSULIN (HCC): Status: ACTIVE | Noted: 2017-12-13

## 2017-12-18 LAB
GLUCOSE BLD-MCNC: 165 MG/DL (ref 75–110)
GLUCOSE BLD-MCNC: 278 MG/DL (ref 75–110)
HCT VFR BLD CALC: 36.5 % (ref 40.7–50.3)
HEMOGLOBIN: 11.4 G/DL (ref 13–17)
MCH RBC QN AUTO: 24.2 PG (ref 25.2–33.5)
MCHC RBC AUTO-ENTMCNC: 31.2 G/DL (ref 28.4–34.8)
MCV RBC AUTO: 77.3 FL (ref 82.6–102.9)
PDW BLD-RTO: 14.9 % (ref 11.8–14.4)
PLATELET # BLD: 526 K/UL (ref 138–453)
PMV BLD AUTO: 8.7 FL (ref 8.1–13.5)
RBC # BLD: 4.72 M/UL (ref 4.21–5.77)
WBC # BLD: 11.4 K/UL (ref 3.5–11.3)

## 2017-12-18 PROCEDURE — 6370000000 HC RX 637 (ALT 250 FOR IP): Performed by: STUDENT IN AN ORGANIZED HEALTH CARE EDUCATION/TRAINING PROGRAM

## 2017-12-18 PROCEDURE — 1200000000 HC SEMI PRIVATE

## 2017-12-18 PROCEDURE — 97535 SELF CARE MNGMENT TRAINING: CPT

## 2017-12-18 PROCEDURE — 99233 SBSQ HOSP IP/OBS HIGH 50: CPT | Performed by: INTERNAL MEDICINE

## 2017-12-18 PROCEDURE — 94762 N-INVAS EAR/PLS OXIMTRY CONT: CPT

## 2017-12-18 PROCEDURE — 85027 COMPLETE CBC AUTOMATED: CPT

## 2017-12-18 PROCEDURE — 6360000002 HC RX W HCPCS: Performed by: STUDENT IN AN ORGANIZED HEALTH CARE EDUCATION/TRAINING PROGRAM

## 2017-12-18 PROCEDURE — 97116 GAIT TRAINING THERAPY: CPT

## 2017-12-18 PROCEDURE — 99232 SBSQ HOSP IP/OBS MODERATE 35: CPT | Performed by: INTERNAL MEDICINE

## 2017-12-18 PROCEDURE — 2580000003 HC RX 258: Performed by: STUDENT IN AN ORGANIZED HEALTH CARE EDUCATION/TRAINING PROGRAM

## 2017-12-18 PROCEDURE — 2580000003 HC RX 258: Performed by: EMERGENCY MEDICINE

## 2017-12-18 PROCEDURE — 97110 THERAPEUTIC EXERCISES: CPT

## 2017-12-18 PROCEDURE — 36415 COLL VENOUS BLD VENIPUNCTURE: CPT

## 2017-12-18 PROCEDURE — 6360000002 HC RX W HCPCS: Performed by: EMERGENCY MEDICINE

## 2017-12-18 PROCEDURE — 76937 US GUIDE VASCULAR ACCESS: CPT

## 2017-12-18 PROCEDURE — 82947 ASSAY GLUCOSE BLOOD QUANT: CPT

## 2017-12-18 RX ORDER — DILTIAZEM HYDROCHLORIDE 120 MG/1
120 CAPSULE, COATED, EXTENDED RELEASE ORAL DAILY
Status: DISCONTINUED | OUTPATIENT
Start: 2017-12-18 | End: 2017-12-20

## 2017-12-18 RX ADMIN — INSULIN LISPRO 3 UNITS: 100 INJECTION, SOLUTION INTRAVENOUS; SUBCUTANEOUS at 22:05

## 2017-12-18 RX ADMIN — ATORVASTATIN CALCIUM 20 MG: 20 TABLET, FILM COATED ORAL at 08:30

## 2017-12-18 RX ADMIN — VANCOMYCIN HYDROCHLORIDE 1250 MG: 1 INJECTION, POWDER, LYOPHILIZED, FOR SOLUTION INTRAVENOUS at 08:33

## 2017-12-18 RX ADMIN — TAZOBACTAM SODIUM AND PIPERACILLIN SODIUM 3.38 G: 375; 3 INJECTION, SOLUTION INTRAVENOUS at 05:32

## 2017-12-18 RX ADMIN — VANCOMYCIN HYDROCHLORIDE 1250 MG: 1 INJECTION, POWDER, LYOPHILIZED, FOR SOLUTION INTRAVENOUS at 20:11

## 2017-12-18 RX ADMIN — DILTIAZEM HYDROCHLORIDE 120 MG: 120 CAPSULE, COATED, EXTENDED RELEASE ORAL at 08:33

## 2017-12-18 RX ADMIN — ASPIRIN 81 MG: 81 TABLET, COATED ORAL at 08:30

## 2017-12-18 RX ADMIN — Medication 10 ML: at 20:11

## 2017-12-18 RX ADMIN — LISINOPRIL AND HYDROCHLOROTHIAZIDE 1 TABLET: 12.5; 2 TABLET ORAL at 08:30

## 2017-12-18 RX ADMIN — GLIMEPIRIDE 2 MG: 2 TABLET ORAL at 08:30

## 2017-12-18 ASSESSMENT — PAIN SCALES - GENERAL
PAINLEVEL_OUTOF10: 0
PAINLEVEL_OUTOF10: 0

## 2017-12-18 ASSESSMENT — PAIN DESCRIPTION - FREQUENCY: FREQUENCY: INTERMITTENT

## 2017-12-18 ASSESSMENT — PAIN DESCRIPTION - PAIN TYPE: TYPE: SURGICAL PAIN

## 2017-12-18 ASSESSMENT — PAIN DESCRIPTION - PROGRESSION: CLINICAL_PROGRESSION: GRADUALLY IMPROVING

## 2017-12-18 ASSESSMENT — PAIN DESCRIPTION - LOCATION: LOCATION: FOOT

## 2017-12-18 NOTE — PROGRESS NOTES
[facility] - Progress Note  Today's Date and Time: 12/18/2017, 10:37 AM    Impression :   1. Gangrene of 2nd and third toes of the right foot s/p amputation 12/15/17  2. Superficial Gangrene of distal aspect 2nd left toe. 3. MRSA infection  4. DM 2  5. PAD  6. HTN    Recommendations   1. Continue Vancomycin 1.5 gm q 24h  2. Discontinue Zosyn 3.3 g q8 h  3. Wound care    Chief complaint/reason for consultation:   Management for gangrene on right and left toes    History of Present Illness:     INITIAL HISTORY:    Yonis Esquivel is a 69y.o.-year-old  male who was initially admitted on 12/13/2017. The patient presented to the ED with a complaint of leg swelling and gangrene of his toes. Two weeks ago, the patient noticed the swelling and toe changes. Since he already had an appointment scheduled with Podiatry on 12/13/2017 to follow up his right foot wound, he decided to wait until then to get his feet examined. The patient was sent to the ED directly from the clinic because of the extent of the apparent gangrene. On 12/6/17, the patient visited the Podiatric clinic for a Diabetic foot examination. A few days prior, the patient noticed wounds on his right foot and drainage in his shoes. He was given Doxycycline for 10 days and referred to Vascular Surgery for PVRs. A follow up visit was scheduled for 12/13/2017. On 12/11/17, arterial PVR showed mild-moderate vascular insufficiency at rest bilaterally. 12/11 foot XR showed new bony destruction of 2nd and 3rd toes with stable partial destruction of the 1st toe. In related Past HX: In 2015, the patient was admitted for left toe osteomyelitis and foot blisters, which were treated with Doxycycline and Cipro. In 2016, he was admitted under observation for right foot ulcerations. The wounds were debrided and cultured. The Wound cultures were positive for MRSA.  He was started on Doxycycline for 10 days and instructed on dressing changes. On current examination, the patient was resting comfortably and not in acute distress. He denied any trauma to his feet. He denied any foot pain. Right foot: 2nd and 3rd digits appeared black and malodorous. There was no drainage at the site. No pain on palpation. Left foot: the tip of the 2nd digit appeared black and malodorous. Negative for drainage. Negative for pain on palpation. WBC 17.9 -->13.6    CURRENT EVALUATION : 12/18/2017    The patient was examined at bedside this morning. He was resting comfortably. NAD. Able to ambulate on his own. He denied pain, fever/chills/ SOB, diarrhea/abdominal pains. POD #2 s/p 2nd and 3rd digit amputation of the right foot. No post-op complications thus far. Afebrile overnight  VS remain stable    WBC 12.9 --> 8.3 -->11.0 -->11.4  Cr 1.14 --> 1.01    Cultures:  Wound: No growth in 1 day. Rare neutrophils    Per Podiatry, tentataive plan for wound vac on Monday or Tuesday vs delayed primary closure in OR. Discussion    The patient has progressive PAD and has been following with Podiatry for many years. Current admission with superficial dry gangrene of Lt 2nd toe and tissue necrosis and gangrene of Rt 2nd and third toes. The left second toe can be treated with local care. Vascular recommended to proceed with Rt foot toe amputations. Patient had right 2nd/3rd digit amputation per Podiatry on 12/16/17. Plan:  · Continue Vancomycin 1.5 gm q 24h  · Discontinue Zosyn 3.3 g q8 h  · Wound care         Discussed with patient, RN.    [id number]:      12-13-17: I have personally reviewed the past medical history, past surgical history, medications, social history, and family history, and I have updated the database accordingly.   Past Medical History:     Past Medical History:   Diagnosis Date    Hypertension     MRSA (methicillin resistant staph aureus) culture positive 07/01/2016    foot    Type II or unspecified type diabetes mellitus without mention of complication, not stated as uncontrolled        Past Surgical  History:     Past Surgical History:   Procedure Laterality Date    TOE AMPUTATION Right 12/16/2017    RIGHT 2ND AND 3RD DIGITS TOE AMPUTATION FOR WET GANGRENE performed by Hansel Albrecht DPM at [facility] OR       Medications:      diltiazem  120 mg Oral Daily    lisinopril-hydrochlorothiazide  1 tablet Oral Daily    glimepiride  2 mg Oral Daily with breakfast    vancomycin  1,250 mg Intravenous Q12H    aspirin EC  81 mg Oral Daily    atorvastatin  20 mg Oral Daily    sodium chloride flush  10 mL Intravenous 2 times per day    piperacillin-tazobactam  3.375 g Intravenous Q8H       Social History:     Social History     Social History    Marital status: Single     Spouse name: N/A    Number of children: N/A    Years of education: N/A     Occupational History    Not on file. Social History Main Topics    Smoking status: Never Smoker    Smokeless tobacco: Never Used    Alcohol use No    Drug use: No    Sexual activity: Not on file     Other Topics Concern    Not on file     Social History Narrative    No narrative on file       Family History:     Family History   Problem Relation Age of Onset    Arthritis Mother     Diabetes Father         Allergies:   Review of patient's allergies indicates no known allergies. Review of Systems Update: 12/18/2017   Constitutional: No chill or fever or systemic complaints  Head: Denied H/A  Eyes: No vision changes or blurry vision  ENT: No sore throat or runny nose. No hearing loss. Cardiovascular: No chest pain. No palpitations  Lung: Denied SOB, cough or sputum production  Abdomen: No nausea, vomiting, diarrhea. No abdominal pain or cramping  Genitourinary: No increased urinary frequency, or dysuria. Musculoskeletal: Negative for drainage. Negative for pain on palpation. S/p amputation. Right leg bandaged.   Hematologic: No last 72 hours. Lab Results   Component Value Date    RBC 4.72 12/18/2017    WBC 11.4 12/18/2017       Lab Results   Component Value Date    CREATININE 1.01 12/17/2017    GLUCOSE 144 12/17/2017    GLUCOSE 132 12/29/2011     XR Right foot 12/11/2017  Impression   1. New bony destruction involving the 2nd and 3rd distal phalanges suggesting   osteomyelitis   2. Stable partial destruction of the 1st distal phalanx           Lower extremity Doppler 12/11/2017  Summary        Bilateral lower extremity ABIs and PVRs (with toe pressures ) were    performed for the evaluation of peripheral vascular disease. Study    demonstrates:        ABNORMAL        Right:    Mild to moderate vascular insufficiency at rest.        Left:    Mild to moderate vascular insufficiency at rest.     Lower extremity Doppler 9/26/2016   Summary        Normal PVR at rest of the bilateral lower extremity.    Normal PVR with exercise of the bilateral lower extremity.    Normal arterial PPG waveforms of the bilateral digits.    Severe small vessel and/or vasospastic disease of the left 2,3 digits. .................................................................................................................................... Thank you for allowing us to participate in the care of this patient. Please do not hesitate to call with questions.     Meseret Shen MD  12/18/2017

## 2017-12-18 NOTE — PLAN OF CARE
Problem: Nutrition  Goal: Optimal nutrition therapy  Outcome: Ongoing  Nutrition Problem: Increased nutrient needs  Intervention: Food and/or Nutrient Delivery: Continue current diet  Nutritional Goals: Meet % of estimated nutrient needs.

## 2017-12-18 NOTE — PROGRESS NOTES
with vasculopathy. No crepitus noted to midfoot. No proximal streaking or palpable cords. Dark discoloration Left 2nd digit unchanged from previous exam.           Labs:  Lab Results   Component Value Date    WBC 11.4 (H) 12/18/2017    HGB 11.4 (L) 12/18/2017    HCT 36.5 (L) 12/18/2017     (H) 12/18/2017    LYMPHOPCT 16 (L) 12/17/2017    MONOPCT 8 12/17/2017    BASOPCT 1 12/17/2017     Lab Results   Component Value Date     (L) 12/17/2017    K 4.3 12/17/2017    CL 99 12/17/2017    CO2 22 12/17/2017    BUN 14 12/17/2017    CREATININE 1.01 12/17/2017    GLUCOSE 144 (H) 12/17/2017    CALCIUM 7.9 (L) 12/17/2017     Lab Results   Component Value Date    CALCIUM 7.9 (L) 12/17/2017     Imaging:   XR Foot Right 12/15/17:  Impression   1. Extensive soft tissue gas throughout the forefoot primarily about the 2nd   and 3rd digits.  Gas gangrene or necrotizing infection is not excluded on the   basis of this study. 2. Stable partial destruction of the distal phalanx of the 1st digit with   soft tissue swelling of the right great toe an overlying ulceration. Osteomyelitis is difficult to exclude. 3. Interval amputation of the 2nd and 3rd digits at the level of the DIP   joints. 4. Stable mild nonspecific cortical thickening along the 4th metatarsal   diaphysis. Cultures Intra-Op 12/16/17:   Anaerobic and Aerobic Culture [[id number]] (Abnormal) Collected: 12/16/17 1048   Order Status: Completed Specimen: Tissue from Bone Updated: 12/17/17 1612    Specimen Description . BONE . FOOT RIGHT FOOT 2ND AND 3RD DIGITS    Special Requests NOT REPORTED    Direct Exam RARE NEUTROPHILS (A)    Direct Exam NO BACTERIA SEEN    Culture NO GROWTH 1 DAY    Culture [employer] [address] [facility], [address] [place] [address] [phone]    Status Pending           ASSESSMENT:     Patient is a 69 y.o. male with    - POD#2 amputation Right 2nd and 3rd digits to level of MTPJ   - Wet gangrene Right 2nd and 3rd digits    - Early evidence ischemia noted to Left 2nd digit   - PAD s/p angio per Dr. Leslie Young (DOS 12/14/17)   - Diabetes mellitus with peripheral neuropathy   - Hypocalcemia    - Hypokalemia    Patient Active Problem List   Diagnosis    Diabetes mellitus ([facility]    High blood pressure    Onychomycosis    Diabetic gangrene ([facility]    Type 2 diabetes mellitus with circulatory disorder (HCC)    Hypertension    Hyponatremia    Microcytic anemia    Gangrene of right foot (HCC)    MRSA (methicillin resistant Staphylococcus aureus) infection    Osteomyelitis of right foot ([facility]       PLAN:     Patient examined and evaluated. Labs and imaging reviewed  Appreciate vascular recs to proceed with toe amputations  Appreciate ID recs, IV vanc - will need abx recs for homegoing; intra-op cultures pending  Appreciate IM for medical management  Irrigated Left foot wound with >200cc sterile saline and packed with 1/4\" iodoform strips covered with saline WTD and LIGHT ACE  OK to bear weight to Left heel in surgical shoe  Tentative plan for delayed primary closure in OR - scheduling will only give podiatry \"add-on end-of-day\"  Discussed with Dr. Gabriela Guthrie.     Electronically signed by Eboni Whitaker DPM on 12/18/2017 at 12:55 PM

## 2017-12-18 NOTE — PROGRESS NOTES
anemia    Gangrene of right foot (HCC)    MRSA (methicillin resistant Staphylococcus aureus) infection    Osteomyelitis of right foot ([address]    -Continue amaryl 2mg and monitor blood sugar qachs   -Continue prinzide 20-12.5mg QD, add cardizem 120mg QD  -Continue iron supplements   -Podiatry- delayed primary closure vs wound vac; awaiting recommendations  -ID- Vancomycin/Zosyn IV; awaiting final recommendations     Deepak Narayan, PGY-1  Internal Medicine [address], [facility]    Attending Physician Statement  I have discussed the care of Lizandro Terry, including pertinent history and exam findings with the resident. I have reviewed the key elements of all parts of the encounter with the resident. I have seen and examined the patient with the resident. I agree with the assessment and plan and status of the problem list as documented. As noted and chart reviewed. He was started on Amaryl 2 mg yesterday and his blood sugar has been stable on current dose and no episodes of hypoglycemia noted. He is continued on vancomycin and Zosyn per infectious disease. Seen by podiatry and plan for delayed primary closure in OR. His blood pressure was high yesterday and he was now on all his medication which she take at home and on.lisinopril plus hydrochlorothiazide and Cardizem.   We will continue to monitor blood sugar and adjust Amaryl currently he is off metformin        Verona Santos MD  12/18/2017 1:41 PM

## 2017-12-18 NOTE — PROGRESS NOTES
[address] [facility] [address]  Occupational Therapy Not Seen Note    Patient not available for Occupational Therapy due to:    [] Testing:    [] Hemodialysis    [] Blood Transfusion in Progress    []Refusal by Patient:    [] Surgery/Procedure:    [] Strict Bedrest    [] Sedation    [] Spine Precautions     [] Pt being transferred to palliative care at this time. Spoke with pt/family and OT services to be defered. [] Pt independent with functional mobility and functional tasks.  Pt with no OT acute care needs at this time, will defer OT eval.    [x] Other: Pt off the unit at the PT gym    Next Scheduled Treatment: Will check back PM as able or 12/19/2017    Signature: LEEANN [place]/HEATHER

## 2017-12-18 NOTE — PROGRESS NOTES
assistance  Ambulation  Ambulation?: Yes  Ambulation 1  Surface: level tile  Device: No Device  Assistance: Contact guard assistance;Stand by assistance  Quality of Gait: amb with surgical shoe, no LOB, decreased catherine  Distance: 300ft  Comments: required assist with IV pole  Stairs/Curb  Stairs?: Yes  Stairs  # Steps : 10  Stairs Height: 6\"  Rails: Right ascending  Device: No Device  Assistance: Stand by assistance  Comment: reciprocal     Balance  Posture: Good  Sitting - Static: Good  Sitting - Dynamic: Good  Standing - Static: Good  Standing - Dynamic: Good;-, stood at sink for ADLs with no LOB       Exercises:  Seated LE exercise program: Long Arc Quads, hip abduction/adduction, heel/toe raises, and marches. Reps: 20x with 2#  Rebounder x 20 tosses, SBA, no LOB  UBE x 5 minutes, standing, SBA  Completed ball toss and reached for cones outside GABRIELA without UE support, SBA with no LOB noted     Assessment   Body structures, Functions, Activity limitations: Decreased functional mobility ; Decreased endurance  Assessment: Pt displays no LOB during high-level balance activities. Demos ability to navigate 10 stairs with SBA. Should be safe to return home at discharge. Prognosis: Good  Activity Tolerance  Activity Tolerance: Patient Tolerated treatment well     Goals  Short term goals  Time Frame for Short term goals: 6 visits  Short term goal 1: Independent transfers  Short term goal 2: Independent ambulation without device 600 ft  Short term goal 3: Independent on 12 stairs with 1 rail. Short term goal 4: improve standing dynamic balance to Good. Patient Goals   Patient goals : Go home soon    Plan    Plan  Times per week: 5-6x/week  Specific instructions for Next Treatment: high balance, stairs  Current Treatment Recommendations: Balance Training, Stair training, Gait Training, Transfer Training, Functional Mobility Training  Safety Devices  Type of devices:  All fall risk precautions in place, Gait belt

## 2017-12-19 ENCOUNTER — ANESTHESIA (OUTPATIENT)
Dept: OPERATING ROOM | Age: 67
DRG: 256 | End: 2017-12-19
Payer: MEDICARE

## 2017-12-19 ENCOUNTER — ANESTHESIA EVENT (OUTPATIENT)
Dept: OPERATING ROOM | Age: 67
DRG: 256 | End: 2017-12-19
Payer: MEDICARE

## 2017-12-19 VITALS — DIASTOLIC BLOOD PRESSURE: 59 MMHG | OXYGEN SATURATION: 100 % | SYSTOLIC BLOOD PRESSURE: 122 MMHG

## 2017-12-19 LAB
ANION GAP SERPL CALCULATED.3IONS-SCNC: 13 MMOL/L (ref 9–17)
BUN BLDV-MCNC: 15 MG/DL (ref 8–23)
BUN/CREAT BLD: ABNORMAL (ref 9–20)
CALCIUM SERPL-MCNC: 8 MG/DL (ref 8.6–10.4)
CHLORIDE BLD-SCNC: 97 MMOL/L (ref 98–107)
CO2: 23 MMOL/L (ref 20–31)
CREAT SERPL-MCNC: 1.13 MG/DL (ref 0.7–1.2)
GFR AFRICAN AMERICAN: >60 ML/MIN
GFR NON-AFRICAN AMERICAN: >60 ML/MIN
GFR SERPL CREATININE-BSD FRML MDRD: ABNORMAL ML/MIN/{1.73_M2}
GFR SERPL CREATININE-BSD FRML MDRD: ABNORMAL ML/MIN/{1.73_M2}
GLUCOSE BLD-MCNC: 107 MG/DL (ref 75–110)
GLUCOSE BLD-MCNC: 128 MG/DL (ref 75–110)
GLUCOSE BLD-MCNC: 143 MG/DL (ref 75–110)
GLUCOSE BLD-MCNC: 158 MG/DL (ref 75–110)
GLUCOSE BLD-MCNC: 159 MG/DL (ref 75–110)
GLUCOSE BLD-MCNC: 167 MG/DL (ref 75–110)
GLUCOSE BLD-MCNC: 216 MG/DL (ref 75–110)
GLUCOSE BLD-MCNC: 268 MG/DL (ref 70–99)
GLUCOSE BLD-MCNC: 71 MG/DL (ref 75–110)
GLUCOSE BLD-MCNC: 89 MG/DL (ref 75–110)
GLUCOSE BLD-MCNC: 94 MG/DL (ref 75–110)
POTASSIUM SERPL-SCNC: 4 MMOL/L (ref 3.7–5.3)
SODIUM BLD-SCNC: 133 MMOL/L (ref 135–144)

## 2017-12-19 PROCEDURE — 1200000000 HC SEMI PRIVATE

## 2017-12-19 PROCEDURE — 7100000010 HC PHASE II RECOVERY - FIRST 15 MIN: Performed by: PODIATRIST

## 2017-12-19 PROCEDURE — 3700000001 HC ADD 15 MINUTES (ANESTHESIA): Performed by: PODIATRIST

## 2017-12-19 PROCEDURE — 6360000002 HC RX W HCPCS: Performed by: NURSE ANESTHETIST, CERTIFIED REGISTERED

## 2017-12-19 PROCEDURE — 99232 SBSQ HOSP IP/OBS MODERATE 35: CPT | Performed by: INTERNAL MEDICINE

## 2017-12-19 PROCEDURE — 2500000003 HC RX 250 WO HCPCS: Performed by: PODIATRIST

## 2017-12-19 PROCEDURE — 36415 COLL VENOUS BLD VENIPUNCTURE: CPT

## 2017-12-19 PROCEDURE — A6446 CONFORM BAND S W>=3" <5"/YD: HCPCS | Performed by: PODIATRIST

## 2017-12-19 PROCEDURE — A6403 STERILE GAUZE>16 <= 48 SQ IN: HCPCS | Performed by: PODIATRIST

## 2017-12-19 PROCEDURE — 3600000013 HC SURGERY LEVEL 3 ADDTL 15MIN: Performed by: PODIATRIST

## 2017-12-19 PROCEDURE — 82947 ASSAY GLUCOSE BLOOD QUANT: CPT

## 2017-12-19 PROCEDURE — 97110 THERAPEUTIC EXERCISES: CPT

## 2017-12-19 PROCEDURE — 2580000003 HC RX 258: Performed by: EMERGENCY MEDICINE

## 2017-12-19 PROCEDURE — 6360000002 HC RX W HCPCS: Performed by: EMERGENCY MEDICINE

## 2017-12-19 PROCEDURE — 7100000011 HC PHASE II RECOVERY - ADDTL 15 MIN: Performed by: PODIATRIST

## 2017-12-19 PROCEDURE — 2580000003 HC RX 258: Performed by: HOSPITALIST

## 2017-12-19 PROCEDURE — 80048 BASIC METABOLIC PNL TOTAL CA: CPT

## 2017-12-19 PROCEDURE — 94762 N-INVAS EAR/PLS OXIMTRY CONT: CPT

## 2017-12-19 PROCEDURE — 6370000000 HC RX 637 (ALT 250 FOR IP): Performed by: STUDENT IN AN ORGANIZED HEALTH CARE EDUCATION/TRAINING PROGRAM

## 2017-12-19 PROCEDURE — 0HQMXZZ REPAIR RIGHT FOOT SKIN, EXTERNAL APPROACH: ICD-10-PCS | Performed by: PODIATRIST

## 2017-12-19 PROCEDURE — 3600000003 HC SURGERY LEVEL 3 BASE: Performed by: PODIATRIST

## 2017-12-19 PROCEDURE — 0JBQ0ZZ EXCISION OF RIGHT FOOT SUBCUTANEOUS TISSUE AND FASCIA, OPEN APPROACH: ICD-10-PCS | Performed by: PODIATRIST

## 2017-12-19 PROCEDURE — A6450 LT COMPRES BAND >=5"/YD: HCPCS | Performed by: PODIATRIST

## 2017-12-19 PROCEDURE — 2500000003 HC RX 250 WO HCPCS: Performed by: NURSE ANESTHETIST, CERTIFIED REGISTERED

## 2017-12-19 PROCEDURE — 3700000000 HC ANESTHESIA ATTENDED CARE: Performed by: PODIATRIST

## 2017-12-19 PROCEDURE — 97116 GAIT TRAINING THERAPY: CPT

## 2017-12-19 RX ORDER — OXYCODONE HYDROCHLORIDE AND ACETAMINOPHEN 5; 325 MG/1; MG/1
2 TABLET ORAL PRN
Status: DISCONTINUED | OUTPATIENT
Start: 2017-12-19 | End: 2017-12-19 | Stop reason: HOSPADM

## 2017-12-19 RX ORDER — ATORVASTATIN CALCIUM 40 MG/1
40 TABLET, FILM COATED ORAL DAILY
Status: DISCONTINUED | OUTPATIENT
Start: 2017-12-20 | End: 2017-12-21 | Stop reason: HOSPADM

## 2017-12-19 RX ORDER — DIPHENHYDRAMINE HYDROCHLORIDE 50 MG/ML
12.5 INJECTION INTRAMUSCULAR; INTRAVENOUS
Status: DISCONTINUED | OUTPATIENT
Start: 2017-12-19 | End: 2017-12-19 | Stop reason: HOSPADM

## 2017-12-19 RX ORDER — MIDAZOLAM HYDROCHLORIDE 1 MG/ML
INJECTION INTRAMUSCULAR; INTRAVENOUS PRN
Status: DISCONTINUED | OUTPATIENT
Start: 2017-12-19 | End: 2017-12-19 | Stop reason: SDUPTHER

## 2017-12-19 RX ORDER — LABETALOL HYDROCHLORIDE 5 MG/ML
5 INJECTION, SOLUTION INTRAVENOUS EVERY 10 MIN PRN
Status: DISCONTINUED | OUTPATIENT
Start: 2017-12-19 | End: 2017-12-19 | Stop reason: HOSPADM

## 2017-12-19 RX ORDER — MEPERIDINE HYDROCHLORIDE 50 MG/ML
12.5 INJECTION INTRAMUSCULAR; INTRAVENOUS; SUBCUTANEOUS EVERY 5 MIN PRN
Status: DISCONTINUED | OUTPATIENT
Start: 2017-12-19 | End: 2017-12-19 | Stop reason: HOSPADM

## 2017-12-19 RX ORDER — GLYCOPYRROLATE 0.2 MG/ML
INJECTION INTRAMUSCULAR; INTRAVENOUS PRN
Status: DISCONTINUED | OUTPATIENT
Start: 2017-12-19 | End: 2017-12-19 | Stop reason: SDUPTHER

## 2017-12-19 RX ORDER — FENTANYL CITRATE 50 UG/ML
50 INJECTION, SOLUTION INTRAMUSCULAR; INTRAVENOUS EVERY 5 MIN PRN
Status: DISCONTINUED | OUTPATIENT
Start: 2017-12-19 | End: 2017-12-19 | Stop reason: HOSPADM

## 2017-12-19 RX ORDER — HYDRALAZINE HYDROCHLORIDE 20 MG/ML
5 INJECTION INTRAMUSCULAR; INTRAVENOUS EVERY 10 MIN PRN
Status: DISCONTINUED | OUTPATIENT
Start: 2017-12-19 | End: 2017-12-19 | Stop reason: HOSPADM

## 2017-12-19 RX ORDER — BUPIVACAINE HYDROCHLORIDE 5 MG/ML
INJECTION, SOLUTION EPIDURAL; INTRACAUDAL PRN
Status: DISCONTINUED | OUTPATIENT
Start: 2017-12-19 | End: 2017-12-19 | Stop reason: HOSPADM

## 2017-12-19 RX ORDER — KETAMINE HYDROCHLORIDE 100 MG/ML
INJECTION, SOLUTION INTRAMUSCULAR; INTRAVENOUS PRN
Status: DISCONTINUED | OUTPATIENT
Start: 2017-12-19 | End: 2017-12-19 | Stop reason: SDUPTHER

## 2017-12-19 RX ORDER — LIDOCAINE HYDROCHLORIDE 10 MG/ML
INJECTION, SOLUTION EPIDURAL; INFILTRATION; INTRACAUDAL; PERINEURAL PRN
Status: DISCONTINUED | OUTPATIENT
Start: 2017-12-19 | End: 2017-12-19 | Stop reason: SDUPTHER

## 2017-12-19 RX ORDER — OXYCODONE HYDROCHLORIDE AND ACETAMINOPHEN 5; 325 MG/1; MG/1
1 TABLET ORAL PRN
Status: DISCONTINUED | OUTPATIENT
Start: 2017-12-19 | End: 2017-12-19 | Stop reason: HOSPADM

## 2017-12-19 RX ORDER — ONDANSETRON 2 MG/ML
4 INJECTION INTRAMUSCULAR; INTRAVENOUS
Status: DISCONTINUED | OUTPATIENT
Start: 2017-12-19 | End: 2017-12-19 | Stop reason: HOSPADM

## 2017-12-19 RX ORDER — MORPHINE SULFATE 2 MG/ML
2 INJECTION, SOLUTION INTRAMUSCULAR; INTRAVENOUS EVERY 5 MIN PRN
Status: DISCONTINUED | OUTPATIENT
Start: 2017-12-19 | End: 2017-12-19 | Stop reason: HOSPADM

## 2017-12-19 RX ORDER — PROPOFOL 10 MG/ML
INJECTION, EMULSION INTRAVENOUS CONTINUOUS PRN
Status: DISCONTINUED | OUTPATIENT
Start: 2017-12-19 | End: 2017-12-19 | Stop reason: SDUPTHER

## 2017-12-19 RX ORDER — SODIUM CHLORIDE 9 MG/ML
INJECTION, SOLUTION INTRAVENOUS CONTINUOUS
Status: DISCONTINUED | OUTPATIENT
Start: 2017-12-19 | End: 2017-12-20

## 2017-12-19 RX ORDER — PROPOFOL 10 MG/ML
INJECTION, EMULSION INTRAVENOUS PRN
Status: DISCONTINUED | OUTPATIENT
Start: 2017-12-19 | End: 2017-12-19 | Stop reason: SDUPTHER

## 2017-12-19 RX ORDER — FENTANYL CITRATE 50 UG/ML
25 INJECTION, SOLUTION INTRAMUSCULAR; INTRAVENOUS EVERY 5 MIN PRN
Status: DISCONTINUED | OUTPATIENT
Start: 2017-12-19 | End: 2017-12-19 | Stop reason: HOSPADM

## 2017-12-19 RX ORDER — LIDOCAINE HYDROCHLORIDE 20 MG/ML
INJECTION, SOLUTION EPIDURAL; INFILTRATION; INTRACAUDAL; PERINEURAL PRN
Status: DISCONTINUED | OUTPATIENT
Start: 2017-12-19 | End: 2017-12-19 | Stop reason: HOSPADM

## 2017-12-19 RX ADMIN — KETAMINE HYDROCHLORIDE 25 MG: 100 INJECTION, SOLUTION, CONCENTRATE INTRAMUSCULAR; INTRAVENOUS at 18:38

## 2017-12-19 RX ADMIN — LISINOPRIL AND HYDROCHLOROTHIAZIDE 1 TABLET: 12.5; 2 TABLET ORAL at 08:07

## 2017-12-19 RX ADMIN — SODIUM CHLORIDE: 9 INJECTION, SOLUTION INTRAVENOUS at 06:40

## 2017-12-19 RX ADMIN — VANCOMYCIN HYDROCHLORIDE 1250 MG: 1 INJECTION, POWDER, LYOPHILIZED, FOR SOLUTION INTRAVENOUS at 08:07

## 2017-12-19 RX ADMIN — LIDOCAINE HYDROCHLORIDE 50 MG: 10 INJECTION, SOLUTION EPIDURAL; INFILTRATION; INTRACAUDAL; PERINEURAL at 18:38

## 2017-12-19 RX ADMIN — VANCOMYCIN HYDROCHLORIDE 1250 MG: 1 INJECTION, POWDER, LYOPHILIZED, FOR SOLUTION INTRAVENOUS at 21:00

## 2017-12-19 RX ADMIN — MIDAZOLAM HYDROCHLORIDE 2 MG: 1 INJECTION, SOLUTION INTRAMUSCULAR; INTRAVENOUS at 18:34

## 2017-12-19 RX ADMIN — GLYCOPYRROLATE 0.1 MG: 0.2 INJECTION INTRAMUSCULAR; INTRAVENOUS at 18:35

## 2017-12-19 RX ADMIN — PROPOFOL 25 MCG/KG/MIN: 10 INJECTION, EMULSION INTRAVENOUS at 18:37

## 2017-12-19 RX ADMIN — DILTIAZEM HYDROCHLORIDE 120 MG: 120 CAPSULE, COATED, EXTENDED RELEASE ORAL at 08:07

## 2017-12-19 RX ADMIN — PROPOFOL 30 MG: 10 INJECTION, EMULSION INTRAVENOUS at 18:39

## 2017-12-19 ASSESSMENT — PULMONARY FUNCTION TESTS
PIF_VALUE: 1
PIF_VALUE: 0
PIF_VALUE: 1
PIF_VALUE: 1
PIF_VALUE: 0
PIF_VALUE: 1
PIF_VALUE: 0
PIF_VALUE: 1
PIF_VALUE: 0
PIF_VALUE: 1
PIF_VALUE: 9
PIF_VALUE: 1

## 2017-12-19 ASSESSMENT — PAIN SCALES - GENERAL
PAINLEVEL_OUTOF10: 0

## 2017-12-19 ASSESSMENT — PAIN - FUNCTIONAL ASSESSMENT: PAIN_FUNCTIONAL_ASSESSMENT: 0-10

## 2017-12-19 NOTE — PROGRESS NOTES
[facility] - Progress Note  Today's Date and Time: 12/19/2017, 11:50 AM    Impression :   1. Gangrene of 2nd and third toes of the right foot s/p amputation 12/15/17  2. Superficial Gangrene of distal aspect 2nd left toe. 3. MRSA infection  4. DM 2  5. PAD  6. HTN    Recommendations   1. Continue Vancomycin 1.5 gm q 24h  2. Wound care    Chief complaint/reason for consultation:   Management for gangrene on right and left toes    History of Present Illness:     INITIAL HISTORY:    Thais Calix is a 69y.o.-year-old  male who was initially admitted on 12/13/2017. The patient presented to the ED with a complaint of leg swelling and gangrene of his toes. Two weeks ago, the patient noticed the swelling and toe changes. Since he already had an appointment scheduled with Podiatry on 12/13/2017 to follow up his right foot wound, he decided to wait until then to get his feet examined. The patient was sent to the ED directly from the clinic because of the extent of the apparent gangrene. On 12/6/17, the patient visited the Podiatric clinic for a Diabetic foot examination. A few days prior, the patient noticed wounds on his right foot and drainage in his shoes. He was given Doxycycline for 10 days and referred to Vascular Surgery for PVRs. A follow up visit was scheduled for 12/13/2017. On 12/11/17, arterial PVR showed mild-moderate vascular insufficiency at rest bilaterally. 12/11 foot XR showed new bony destruction of 2nd and 3rd toes with stable partial destruction of the 1st toe. In related Past HX: In 2015, the patient was admitted for left toe osteomyelitis and foot blisters, which were treated with Doxycycline and Cipro. In 2016, he was admitted under observation for right foot ulcerations. The wounds were debrided and cultured. The Wound cultures were positive for MRSA.  He was started on Doxycycline for 10 days and instructed on dressing Component Value Date    RBC 4.72 12/18/2017    WBC 11.4 12/18/2017       Lab Results   Component Value Date    CREATININE 1.13 12/19/2017    GLUCOSE 268 12/19/2017    GLUCOSE 132 12/29/2011     XR Right foot 12/11/2017  Impression   1. New bony destruction involving the 2nd and 3rd distal phalanges suggesting   osteomyelitis   2. Stable partial destruction of the 1st distal phalanx           Lower extremity Doppler 12/11/2017  Summary        Bilateral lower extremity ABIs and PVRs (with toe pressures ) were    performed for the evaluation of peripheral vascular disease. Study    demonstrates:        ABNORMAL        Right:    Mild to moderate vascular insufficiency at rest.        Left:    Mild to moderate vascular insufficiency at rest.     Lower extremity Doppler 9/26/2016   Summary        Normal PVR at rest of the bilateral lower extremity.    Normal PVR with exercise of the bilateral lower extremity.    Normal arterial PPG waveforms of the bilateral digits.    Severe small vessel and/or vasospastic disease of the left 2,3 digits. .................................................................................................................................... Thank you for allowing us to participate in the care of this patient. Please do not hesitate to call with questions.     Gregoria Posadas MD  12/19/2017

## 2017-12-19 NOTE — PROGRESS NOTES
Podiatry Inpatient Progress Note  s/p amputation R2&3 digits for wet gangrene and OM; early ischemia L2 digit    SUBJECTIVE:     Current Evaluation:  Patient seen and evaluated at bedside. Patient denies pain to Right foot. Asking when his surgery will be so he can go home. Currently denies F/C/N/V.     OBJECTIVE:     Vitals:   Vitals:    12/19/17 1243   BP: (!) 140/64   Pulse: 65   Resp: 16   Temp: 98.3 °F (36.8 °C)   SpO2: 99%     General: AAO x 3 in NAD.      Lower Extremity Physical Exam:     Vascular: DP/PT pulses present on doppler, Bilateral. CFT <3 seconds to Right digits 1,4,5 and Left digits 1,3-5. Edema present to Left foot. No varicosities. Hair growth absent to lower extremities, Bilateral.     Neurological: Protective sensation absent at all 10 pedal sites as tested with Brewster-Matias Monofilament 5.07, Bilateral.  Sharp/dull discrimination absent, Bilateral.     Musculoskeletal: Muscle strength 5/5 for all lower extremity muscle groups, Bilateral.  S/p amputation R2&3 digits.      Dermatologic: Skin is noted to be warm, dry, and xerotic, Bilateral. Surgical wound to Left foot with metatarsal heads 2&3 exposed. No purulence or mal odor. Wound base is fibrogranular with serosanguinous drainage. Wound edges appear viable. Minimal bleeding to post-op dressings consistent with vasculopathy. No crepitus noted to midfoot. No proximal streaking or palpable cords.  Dark discoloration Left 2nd digit unchanged from previous exam.     Labs:  Lab Results   Component Value Date    WBC 11.4 (H) 12/18/2017    HGB 11.4 (L) 12/18/2017    HCT 36.5 (L) 12/18/2017     (H) 12/18/2017    LYMPHOPCT 16 (L) 12/17/2017    MONOPCT 8 12/17/2017    BASOPCT 1 12/17/2017     Lab Results   Component Value Date     (L) 12/19/2017    K 4.0 12/19/2017    CL 97 (L) 12/19/2017    CO2 23 12/19/2017    BUN 15 12/19/2017    CREATININE 1.13 12/19/2017    GLUCOSE 268 (H) 12/19/2017    CALCIUM 8.0 (L) 12/19/2017     Lab Results Component Value Date    CALCIUM 8.0 (L) 12/19/2017     Imaging:   XR Foot Right 12/15/17:  Impression   1. Extensive soft tissue gas throughout the forefoot primarily about the 2nd   and 3rd digits.  Gas gangrene or necrotizing infection is not excluded on the   basis of this study. 2. Stable partial destruction of the distal phalanx of the 1st digit with   soft tissue swelling of the right great toe an overlying ulceration. Osteomyelitis is difficult to exclude. 3. Interval amputation of the 2nd and 3rd digits at the level of the DIP   joints. 4. Stable mild nonspecific cortical thickening along the 4th metatarsal   diaphysis. Cultures Intra-Op 12/16/17:   Anaerobic and Aerobic Culture [[id number]] (Abnormal) Collected: 12/16/17 1048   Order Status: Completed Specimen: Tissue from Bone Updated: 12/17/17 1611    Specimen Description . BONE . FOOT RIGHT FOOT 2ND AND 3RD DIGITS    Special Requests NOT REPORTED    Direct Exam RARE NEUTROPHILS (A)    Direct Exam NO BACTERIA SEEN    Culture NO GROWTH 1 DAY    Culture [address] [phone]    Status Pending       ASSESSMENT:     Patient is a 69 y.o. male with    - POD#3 amputation Right 2nd and 3rd digits to level of MTPJ   - Wet gangrene Right 2nd and 3rd digits    - Early evidence ischemia noted to Left 2nd digit   - PAD s/p angio per Dr. Shobha Mason (DOS 12/14/17)   - Diabetes mellitus with peripheral neuropathy   - Hypocalcemia    - Hypokalemia    Patient Active Problem List   Diagnosis    Diabetes mellitus ([address]    High blood pressure    Onychomycosis    Diabetic gangrene ([address]    Type 2 diabetes mellitus with diabetic peripheral angiopathy and gangrene, without long-term current use of insulin (HCC)    Essential hypertension    Hyponatremia    Microcytic anemia    Gangrene of right foot (HCC)    MRSA (methicillin resistant Staphylococcus aureus) infection    Osteomyelitis of right foot ([address] PLAN:   Patient examined and evaluated. Labs and imaging reviewed  Per vascular recs ok to proceed with toe amputations  Per ID- IV vanc  Dressing left C/D/I - OR this evening. OK to bear weight to Left heel in surgical shoe  Tentative plan for delayed primary closure in OR today around 6-7pm, to follow other cases. Discussed with Dr. Kelsy Davison.     Electronically signed by Linda Grimes DPM on 12/19/2017 at 5:30 PM

## 2017-12-19 NOTE — PLAN OF CARE
Problem: Risk for Impaired Skin Integrity  Goal: Tissue integrity - skin and mucous membranes  Structural intactness and normal physiological function of skin and  mucous membranes.    Outcome: Ongoing      Problem: Falls - Risk of  Goal: Absence of falls  Outcome: Met This Shift      Problem: Nutrition  Goal: Optimal nutrition therapy  Outcome: Ongoing

## 2017-12-19 NOTE — ANESTHESIA PRE PROCEDURE
MD   lisinopril-hydrochlorothiazide (PRINZIDE;ZESTORETIC) 20-12.5 MG per tablet  9/6/11   Historical Provider, MD   metformin (GLUCOPHAGE) 1000 MG tablet  11/4/11   Historical Provider, MD       Current medications:    No current facility-administered medications for this visit. No current outpatient prescriptions on file.      Facility-Administered Medications Ordered in Other Visits   Medication Dose Route Frequency Provider Last Rate Last Dose    [MAR Hold] 0.9 % sodium chloride infusion   Intravenous Continuous Nasreen MD Benedict 100 mL/hr at 12/19/17 0640      [MAR Hold] atorvastatin (LIPITOR) tablet 40 mg  40 mg Oral Daily Cruce Gilman [address] 64, DPM        [facility] Hold] diltiazem (CARDIZEM CD) extended release capsule 120 mg  120 mg Oral Daily Nighat Hamilton MD   120 mg at 12/19/17 0807    [MAR Hold] insulin lispro (HUMALOG) injection vial 0-12 Units  0-12 Units Subcutaneous TID WC Jennifer Cuenca MD        [facility] Hold] insulin lispro (HUMALOG) injection vial 0-6 Units  0-6 Units Subcutaneous Nightly Jennifer Cuenca MD   3 Units at 12/18/17 2205    [MAR Hold] lisinopril-hydrochlorothiazide (PRINZIDE;ZESTORETIC) 20-12.5 MG per tablet 1 tablet  1 tablet Oral Daily Nighat Hamilton MD   1 tablet at 12/19/17 0807    [MAR Hold] glimepiride (AMARYL) tablet 2 mg  2 mg Oral Daily with breakfast Nighat Hamilton MD   2 mg at 12/18/17 0830    [MAR Hold] vancomycin (VANCOCIN) 1,250 mg in dextrose 5 % 250 mL IVPB  1,250 mg Intravenous Q12H Henrique Diehl DO   Stopped at 12/19/17 1030    [MAR Hold] ammonium lactate (AMLACTIN) 12 % cream   Topical PRN Cruce Gilman [address], DPM        [MAR Hold] aspirin EC tablet 81 mg  81 mg Oral Daily Lei Lenorah, DPM   81 mg at 12/18/17 0830    [MAR Hold] docusate sodium (COLACE) capsule 100 mg  100 mg Oral BID PRN Cruce Gilman [address] 64, DPM        [MAR Hold] ondansetron (ZOFRAN) tablet 4 mg  4 mg Oral Q8H PRN Cruce Gilman [address], DPM        [facility] povidone-iodine (BETADINE) 10 % external at [address] History:    Social History   Substance Use Topics    Smoking status: Never Smoker    Smokeless tobacco: Never Used    Alcohol use No                                Counseling given: Not Answered      Vital Signs (Current): There were no vitals filed for this visit.                                            BP Readings from Last 3 Encounters:   12/19/17 (!) 154/66   12/16/17 (!) 128/52   12/13/17 (!) 172/82       NPO Status:                                                                                 BMI:   Wt Readings from Last 3 Encounters:   12/13/17 195 lb (88.5 kg)   12/13/17 194 lb (88 kg)   12/06/17 197 lb (89.4 kg)     There is no height or weight on file to calculate BMI.    CBC:   Lab Results   Component Value Date    WBC 11.4 12/18/2017    RBC 4.72 12/18/2017    HGB 11.4 12/18/2017    HCT 36.5 12/18/2017    MCV 77.3 12/18/2017    RDW 14.9 12/18/2017     12/18/2017       CMP:   Lab Results   Component Value Date     12/19/2017    K 4.0 12/19/2017    CL 97 12/19/2017    CO2 23 12/19/2017    BUN 15 12/19/2017    CREATININE 1.13 12/19/2017    GFRAA >60 12/19/2017    LABGLOM >60 12/19/2017    GLUCOSE 268 12/19/2017    GLUCOSE 132 12/29/2011    PROT 8.2 12/13/2017    CALCIUM 8.0 12/19/2017    BILITOT 0.43 12/13/2017    ALKPHOS 81 12/13/2017    AST 29 12/13/2017    ALT 38 12/13/2017       POC Tests:   Recent Labs      12/19/17   1623   POCGLU  89       Coags: No results found for: PROTIME, INR, APTT    HCG (If Applicable): No results found for: PREGTESTUR, PREGSERUM, HCG, HCGQUANT     ABGs: No results found for: PHART, PO2ART, YJN7JUJ, BIH3IQN, BEART, G6GSZKCK     Type & Screen (If Applicable):  No results found for: [facility]    Anesthesia Evaluation  Patient summary reviewed and Nursing notes reviewed no history of anesthetic complications:   Airway: Mallampati: II  TM distance: >3 FB   Neck ROM: full  Mouth opening: > = 3 FB Dental:          Pulmonary:Negative Pulmonary ROS and normal exam  breath sounds clear to auscultation                             Cardiovascular:    (+) hypertension:,                   Neuro/Psych:   Negative Neuro/Psych ROS              GI/Hepatic/Renal:        GERD: pt denies. Endo/Other:    (+) Type II DM, , .                 Abdominal:           Vascular:                                          Anesthesia Plan      TIVA, general and MAC     ASA 3       Induction: intravenous. Anesthetic plan and risks discussed with patient. Use of blood products discussed with patient whom.      Attending anesthesiologist reviewed and agrees with [address] [facility], CRNA   12/19/2017

## 2017-12-19 NOTE — PROGRESS NOTES
Screening  Patient Currently in Pain: Denies  Vital Signs  Patient Currently in Pain: Denies       Orientation  Orientation  Overall Orientation Status: Within Normal Limits  Objective      Transfers  Sit to Stand: Supervision  Stand to sit: Supervision  Ambulation  Ambulation?: Yes  Ambulation 1  Surface: level tile  Device: No Device  Assistance: Supervision;Stand by assistance  Quality of Gait: amb with surgical shoe RLE, decreased catherine  Distance: 50ft, 20ft x 2  Stairs/Curb  Stairs?: Yes  Stairs  # Steps : 20  Stairs Height: 6\"  Rails: Bilateral  Device: No Device  Assistance: Stand by assistance     Balance  Posture: Good  Sitting - Static: Good  Sitting - Dynamic: Good  Standing - Static: Good  Standing - Dynamic: Good       Exercises:  Standing exercise program: hip flexion, hip abduction, mini squats, hip extension, and hamstring curls. Reps: 10x in // bars on foam, no LOB, occasional unilateral UE support required. Nustep x 15 minutes, L2, for endurance and overall strength   Rebounder x 30, SBA  Upper extremity exercises: Bicep curl, shoulder flexion/extension, punches, tricep curl, shoulder abduction/adduction. Reps: 20x 2#  Seated LE exercise program: Long Arc Quads, hip abduction/adduction, heel/toe raises, and marches. Reps: 20x 2#     Assessment   Body structures, Functions, Activity limitations: Decreased endurance  Assessment: Pt with slightly decreased endurance; good tolerance to all activity. Pt currently safe to return home; will continue to assess mobility pending surgery. Prognosis: Good  REQUIRES PT FOLLOW UP: Yes  Activity Tolerance  Activity Tolerance: Patient Tolerated treatment well     Goals  Short term goals  Time Frame for Short term goals: 6 visits  Short term goal 1: Independent transfers  Short term goal 2: Independent ambulation without device 600 ft  Short term goal 3: Independent on 12 stairs with 1 rail.   Short term goal 4: improve standing dynamic balance to Good.  Patient Goals   Patient goals : Go home soon    Plan    Plan  Times per week: 5-6x/week  Specific instructions for Next Treatment: high balance, stairs  Current Treatment Recommendations: Balance Training, Stair training, Gait Training, Transfer Training, Functional Mobility Training  Safety Devices  Type of devices:  All fall risk precautions in place     Therapy Time   Individual Concurrent Group Co-treatment   Time In 0844         Time Out 0931         Minutes MJ Montenegro [address] [place] [address]

## 2017-12-19 NOTE — PROGRESS NOTES
Talked to [place] from podiatry about possible surg today. Said that he would call the unit back at noon to let nursing know if patient will be taken to OR this evening and to make patient NPO now.

## 2017-12-19 NOTE — PLAN OF CARE
Problem: Risk for Impaired Skin Integrity  Goal: Tissue integrity - skin and mucous membranes  Structural intactness and normal physiological function of skin and  mucous membranes.    Outcome: Ongoing      Problem: Falls - Risk of  Goal: Absence of falls  Outcome: Met This Shift

## 2017-12-20 LAB
ANION GAP SERPL CALCULATED.3IONS-SCNC: 15 MMOL/L (ref 9–17)
BUN BLDV-MCNC: 11 MG/DL (ref 8–23)
BUN/CREAT BLD: ABNORMAL (ref 9–20)
CALCIUM SERPL-MCNC: 7.6 MG/DL (ref 8.6–10.4)
CHLORIDE BLD-SCNC: 95 MMOL/L (ref 98–107)
CO2: 21 MMOL/L (ref 20–31)
CREAT SERPL-MCNC: 0.88 MG/DL (ref 0.7–1.2)
CULTURE: ABNORMAL
DIRECT EXAM: ABNORMAL
DIRECT EXAM: ABNORMAL
GFR AFRICAN AMERICAN: >60 ML/MIN
GFR NON-AFRICAN AMERICAN: >60 ML/MIN
GFR SERPL CREATININE-BSD FRML MDRD: ABNORMAL ML/MIN/{1.73_M2}
GFR SERPL CREATININE-BSD FRML MDRD: ABNORMAL ML/MIN/{1.73_M2}
GLUCOSE BLD-MCNC: 181 MG/DL (ref 75–110)
GLUCOSE BLD-MCNC: 213 MG/DL (ref 75–110)
GLUCOSE BLD-MCNC: 226 MG/DL (ref 75–110)
GLUCOSE BLD-MCNC: 235 MG/DL (ref 70–99)
GLUCOSE BLD-MCNC: 236 MG/DL (ref 75–110)
Lab: ABNORMAL
POTASSIUM SERPL-SCNC: 4.1 MMOL/L (ref 3.7–5.3)
SODIUM BLD-SCNC: 131 MMOL/L (ref 135–144)
SPECIMEN DESCRIPTION: ABNORMAL
STATUS: ABNORMAL
SURGICAL PATHOLOGY REPORT: NORMAL

## 2017-12-20 PROCEDURE — 2580000003 HC RX 258: Performed by: EMERGENCY MEDICINE

## 2017-12-20 PROCEDURE — 1200000000 HC SEMI PRIVATE

## 2017-12-20 PROCEDURE — 6370000000 HC RX 637 (ALT 250 FOR IP): Performed by: STUDENT IN AN ORGANIZED HEALTH CARE EDUCATION/TRAINING PROGRAM

## 2017-12-20 PROCEDURE — 2580000003 HC RX 258: Performed by: STUDENT IN AN ORGANIZED HEALTH CARE EDUCATION/TRAINING PROGRAM

## 2017-12-20 PROCEDURE — 6360000002 HC RX W HCPCS: Performed by: EMERGENCY MEDICINE

## 2017-12-20 PROCEDURE — 82947 ASSAY GLUCOSE BLOOD QUANT: CPT

## 2017-12-20 PROCEDURE — 99232 SBSQ HOSP IP/OBS MODERATE 35: CPT | Performed by: INTERNAL MEDICINE

## 2017-12-20 PROCEDURE — 80048 BASIC METABOLIC PNL TOTAL CA: CPT

## 2017-12-20 PROCEDURE — 99233 SBSQ HOSP IP/OBS HIGH 50: CPT | Performed by: INTERNAL MEDICINE

## 2017-12-20 PROCEDURE — 36415 COLL VENOUS BLD VENIPUNCTURE: CPT

## 2017-12-20 PROCEDURE — 97116 GAIT TRAINING THERAPY: CPT

## 2017-12-20 RX ORDER — MINOCYCLINE HYDROCHLORIDE 100 MG/1
100 CAPSULE ORAL 2 TIMES DAILY
Qty: 32 CAPSULE | Refills: 0 | Status: SHIPPED | OUTPATIENT
Start: 2017-12-20 | End: 2017-12-20

## 2017-12-20 RX ORDER — GLIMEPIRIDE 2 MG/1
4 TABLET ORAL
Status: DISCONTINUED | OUTPATIENT
Start: 2017-12-21 | End: 2017-12-21 | Stop reason: HOSPADM

## 2017-12-20 RX ORDER — DILTIAZEM HYDROCHLORIDE 180 MG/1
180 CAPSULE, COATED, EXTENDED RELEASE ORAL DAILY
Status: DISCONTINUED | OUTPATIENT
Start: 2017-12-20 | End: 2017-12-21 | Stop reason: HOSPADM

## 2017-12-20 RX ORDER — MINOCYCLINE HYDROCHLORIDE 100 MG/1
100 CAPSULE ORAL 2 TIMES DAILY
Qty: 32 CAPSULE | Refills: 0 | Status: SHIPPED | OUTPATIENT
Start: 2017-12-20 | End: 2018-01-05

## 2017-12-20 RX ADMIN — VANCOMYCIN HYDROCHLORIDE 1250 MG: 1 INJECTION, POWDER, LYOPHILIZED, FOR SOLUTION INTRAVENOUS at 22:09

## 2017-12-20 RX ADMIN — ATORVASTATIN CALCIUM 40 MG: 40 TABLET, FILM COATED ORAL at 08:21

## 2017-12-20 RX ADMIN — VANCOMYCIN HYDROCHLORIDE 1250 MG: 1 INJECTION, POWDER, LYOPHILIZED, FOR SOLUTION INTRAVENOUS at 08:21

## 2017-12-20 RX ADMIN — LISINOPRIL AND HYDROCHLOROTHIAZIDE 1 TABLET: 12.5; 2 TABLET ORAL at 08:20

## 2017-12-20 RX ADMIN — Medication 10 ML: at 22:09

## 2017-12-20 RX ADMIN — GLIMEPIRIDE 2 MG: 2 TABLET ORAL at 08:20

## 2017-12-20 RX ADMIN — ASPIRIN 81 MG: 81 TABLET, COATED ORAL at 08:20

## 2017-12-20 RX ADMIN — DILTIAZEM HYDROCHLORIDE 180 MG: 180 CAPSULE, COATED, EXTENDED RELEASE ORAL at 10:18

## 2017-12-20 ASSESSMENT — PAIN SCALES - GENERAL: PAINLEVEL_OUTOF10: 0

## 2017-12-20 NOTE — PLAN OF CARE
Problem: Risk for Impaired Skin Integrity  Goal: Tissue integrity - skin and mucous membranes  Structural intactness and normal physiological function of skin and  mucous membranes.    Outcome: Ongoing      Problem: Falls - Risk of  Goal: Absence of falls  Outcome: Met This Shift      Problem: Pain:  Goal: Pain level will decrease  Pain level will decrease   Outcome: Ongoing    Goal: Control of acute pain  Control of acute pain   Outcome: Ongoing    Goal: Control of chronic pain  Control of chronic pain   Outcome: Ongoing

## 2017-12-20 NOTE — PROGRESS NOTES
Reply from Dr. Yariel Sesay w/ BP ok for patient age; no action at this time, will continue to monitor

## 2017-12-20 NOTE — BRIEF OP NOTE
Brief Postoperative Note  ______________________________________________________________  PATIENT NAME: Olvin Ramos DATE: 1950  -  69 y.o. male  MRN: [id number]  DATE: 12/19/2017  BILLING #: [id number]    SURGEON: Sammy Garza DPM     ASSISTANTS: GERTRUDE Son    PRE-OP DIAGNOSIS:   1. Ulceration anterior right foot down to and including fascial and plantar plate structures  2. Diabetes with PVD and peripheral neuropathy    POST-OP DIAGNOSIS: Same as above. PROCEDURE:   1. Sharp excisional debridement down to and including fascia and plantar plate structures right anterior foot ulceration  2. Delayed primary closure of right anterior foot ulceration     ANESTHESIA: MAC with local 8cc of 1:1 mix of 0.5% marcaine plain and 1% lidocaine plain    HEMOSTASIS: None    ESTIMATED BLOOD LOSS: Less than 15cc. MATERIALS: 2-0 vicryl, 2-0 nylon, 4-0 nylon     INJECTABLES: None    SPECIMEN: None    COMPLICATIONS: None    FINDINGS: 3.0 x 3.0 x 1.8cm pre debridement and 3.2 x 3.2 x 2.0cm post debridement, ulceration right anterior foot.      Olu Hunter DPM  12/19/17, 7:39 PM

## 2017-12-20 NOTE — PLAN OF CARE
Problem: Risk for Impaired Skin Integrity  Goal: Tissue integrity - skin and mucous membranes  Structural intactness and normal physiological function of skin and  mucous membranes.    Outcome: Ongoing      Problem: Falls - Risk of  Goal: Absence of falls  Outcome: Ongoing      Problem: Pain:  Goal: Pain level will decrease  Pain level will decrease   Outcome: Ongoing    Goal: Control of acute pain  Control of acute pain   Outcome: Ongoing

## 2017-12-20 NOTE — PROGRESS NOTES
Podiatry Inpatient Progress Note  s/p amputation R2&3 digits for wet gangrene and OM; early ischemia L2 digit  S/p debridement and delayed primary closure, right foot     SUBJECTIVE:     Current Evaluation:  Patient seen and evaluated at bedside. Patient denies any pain to bilateral feet. Understands that he is not to walk on his right foot. Currently denies F/C/N/V.     OBJECTIVE:     Vitals:   Vitals:    12/20/17 1151   BP: (!) 148/70   Pulse: 75   Resp: 16   Temp: 98.3 °F (36.8 °C)   SpO2: 100%     General: AAO x 3 in NAD.      Lower Extremity Physical Exam:     Vascular: DP/PT pulses present on doppler, Bilateral. CFT <3 seconds to Right digits 1,4,5 and Left digits 1,3-5. No cap refill to 2nd digit left. Edema present to Left foot. No varicosities. Hair growth absent to lower extremities, Bilateral.      Neurological: Protective sensation absent at all 10 pedal sites as tested with [place] Monofilament 5.07, Bilateral.  Sharp/dull discrimination absent, Bilateral.     Musculoskeletal: Muscle strength 5/5 for all lower extremity muscle groups, Bilateral.  S/p amputation R2&3 digits.      Dermatologic: Skin is noted to be warm, dry, and xerotic, Bilateral. Incision is well coapted without signs of dehiscence. No crepitus noted to midfoot. No proximal streaking or palpable cords.  Dark discoloration Left 2nd digit unchanged from previous exam.     Labs:  Lab Results   Component Value Date    WBC 11.4 (H) 12/18/2017    HGB 11.4 (L) 12/18/2017    HCT 36.5 (L) 12/18/2017     (H) 12/18/2017    LYMPHOPCT 16 (L) 12/17/2017    MONOPCT 8 12/17/2017    BASOPCT 1 12/17/2017     Lab Results   Component Value Date     (L) 12/20/2017    K 4.1 12/20/2017    CL 95 (L) 12/20/2017    CO2 21 12/20/2017    BUN 11 12/20/2017    CREATININE 0.88 12/20/2017    GLUCOSE 235 (H) 12/20/2017    CALCIUM 7.6 (L) 12/20/2017     Lab Results   Component Value Date    CALCIUM 7.6 (L) 12/20/2017     Imaging:   XR Foot Right 12/15/17:  Impression   1. Extensive soft tissue gas throughout the forefoot primarily about the 2nd   and 3rd digits.  Gas gangrene or necrotizing infection is not excluded on the   basis of this study. 2. Stable partial destruction of the distal phalanx of the 1st digit with   soft tissue swelling of the right great toe an overlying ulceration. Osteomyelitis is difficult to exclude. 3. Interval amputation of the 2nd and 3rd digits at the level of the DIP   joints. 4. Stable mild nonspecific cortical thickening along the 4th metatarsal   diaphysis. Cultures Intra-Op 12/16/17:   Anaerobic and Aerobic Culture [[id number]] (Abnormal) Collected: 12/16/17 1048   Order Status: Completed Specimen: Tissue from Bone Updated: 12/17/17 5158    Specimen Description . BONE . FOOT RIGHT FOOT 2ND AND 3RD DIGITS    Special Requests NOT REPORTED    Direct Exam RARE NEUTROPHILS (A)    Direct Exam NO BACTERIA SEEN    Culture NO GROWTH 1 DAY    Culture [facility] [id number] [place] [address] [phone]    Status Pending       ASSESSMENT:     Patient is a 69 y.o. 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 Catherine Dutton (DOS 12/14/17)   - Diabetes mellitus with peripheral neuropathy   - Hypocalcemia    - Hypokalemia    Patient Active Problem List   Diagnosis    Diabetes mellitus ([address]    High blood pressure    Onychomycosis    Diabetic gangrene ([address]    Type 2 diabetes mellitus with diabetic peripheral angiopathy and gangrene, without long-term current use of insulin (HCC)    Essential hypertension    Hyponatremia    Microcytic anemia    Gangrene of right foot (HCC)    MRSA (methicillin resistant Staphylococcus aureus) infection    Osteomyelitis of right foot ([address]       PLAN:   Patient examined and

## 2017-12-20 NOTE — PROGRESS NOTES
have personally reviewed the past medical history, past surgical history, medications, social history, and family history, and I have updated the database accordingly. Past Medical History:     Past Medical History:   Diagnosis Date    Hypertension     MRSA (methicillin resistant staph aureus) culture positive 07/01/2016    foot    Type II or unspecified type diabetes mellitus without mention of complication, not stated as uncontrolled        Past Surgical  History:     Past Surgical History:   Procedure Laterality Date    TOE AMPUTATION Right 12/16/2017    RIGHT 2ND AND 3RD DIGITS TOE AMPUTATION FOR WET GANGRENE performed by Cathie Narayan DPM at [facility] OR       Medications:      diltiazem  180 mg Oral Daily    [START ON 12/21/2017] glimepiride  4 mg Oral Daily with breakfast    atorvastatin  40 mg Oral Daily    lisinopril-hydrochlorothiazide  1 tablet Oral Daily    vancomycin  1,250 mg Intravenous Q12H    aspirin EC  81 mg Oral Daily    sodium chloride flush  10 mL Intravenous 2 times per day       Social History:     Social History     Social History    Marital status: Single     Spouse name: N/A    Number of children: N/A    Years of education: N/A     Occupational History    Not on file. Social History Main Topics    Smoking status: Never Smoker    Smokeless tobacco: Never Used    Alcohol use No    Drug use: No    Sexual activity: Not on file     Other Topics Concern    Not on file     Social History Narrative    No narrative on file       Family History:     Family History   Problem Relation Age of Onset    Arthritis Mother     Diabetes Father         Allergies:   Review of patient's allergies indicates no known allergies. Review of Systems Updated: 12/20/2017   Constitutional: no fever, chills  Head: No headaches  Eyes: No vision changes or blurry vision  ENT: No sore throat or runny nose. No hearing loss. Cardiovascular: No chest pain/palpitations. Lung: denied SOB.  No

## 2017-12-20 NOTE — PROGRESS NOTES
Physical Therapy  Facility/Department: [address] ORTHO/MED SURG  Daily Treatment Note  NAME: Jt Batista  : 1950  MRN: [id number]    Date of Service: 2017    Patient Diagnosis(es):   Patient Active Problem List    Diagnosis Date Noted    [facility]     Gangrene of right foot ([facility]     MRSA (methicillin resistant Staphylococcus aureus) infection     Diabetic gangrene ([facility] 2017    Type 2 diabetes mellitus with diabetic peripheral angiopathy and gangrene, without long-term current use of insulin ([facility] 2017    Essential hypertension 2017    Hyponatremia 2017    Microcytic anemia 2017    Diabetes mellitus ([facility] 2011    High blood pressure 2011    Onychomycosis 2011       Past Medical History:   Diagnosis Date    Hypertension     MRSA (methicillin resistant staph aureus) culture positive 2016    foot    Type II or unspecified type diabetes mellitus without mention of complication, not stated as uncontrolled      Past Surgical History:   Procedure Laterality Date    TOE AMPUTATION Right 2017    RIGHT 2ND AND 3RD DIGITS TOE AMPUTATION FOR WET GANGRENE performed by Sammy Garza DPM at [address] [facility]  Restrictions/Precautions  Restrictions/Precautions: Fall Risk, Weight Bearing  Required Braces or Orthoses?: Yes  Lower Extremity Weight Bearing Restrictions  Right Lower Extremity Weight Bearing: Non Weight Bearing  Position Activity Restriction  Other position/activity restrictions: Up with assistance  Subjective   General  Chart Reviewed: Yes  Response To Previous Treatment: Patient with no complaints from previous session. Family / Caregiver Present: No  Subjective  Subjective: Pt in chair upon arrival to room. Agreeable to PT. General Comment  Comments: Pt requesting to return to bed after ambulation. Call light in reach and bed alarm on.    Pain Screening  Patient Currently in Pain: Denies  Vital Goals  Short term goals  Time Frame for Short term goals: 6 visits  Short term goal 1: Independent transfers  Short term goal 2: Independent ambulation without device 600 ft  Short term goal 3: Independent on 12 stairs with 1 rail. Short term goal 4: improve standing dynamic balance to Good. Patient Goals   Patient goals : Go home soon    Plan    Plan  Times per week: 5-6x/week  Specific instructions for Next Treatment: high balance, stairs  Current Treatment Recommendations: Balance Training, Stair training, Gait Training, Transfer Training, Functional Mobility Training  Safety Devices  Type of devices:  All fall risk precautions in place, Gait belt, Left in bed, Patient at risk for falls, Bed alarm in place, Nurse notified     Therapy Time   Individual Concurrent Group Co-treatment   Time In [facility]         Time Out 0258         Minutes [address] [place], [place]

## 2017-12-20 NOTE — PROGRESS NOTES
infection    Osteomyelitis of right foot (HCC)    -Continue amaryl 2mg and monitor blood sugar qachs   -Continue prinzide 20-12.5mg QD, Cardizem 180mg QD  -Continue iron supplements   -Podiatry- await final recommendations   -ID- Continue IV Vancomycin Awaiting final recommendations. Surgical pathology results suggestive acute osteomyelitis   -discharge planning- Home with home health care     Candace Britoi, PGY-1  Internal Medicine [address], [facility]        Attending Physician Statement  I have discussed the care of Dodie Martinez, including pertinent history and exam findings with the resident. I have reviewed the key elements of all parts of the encounter with the resident. I have seen and examined the patient with the resident. I agree with the assessment and plan and status of the problem list as documented. No overall change in no acute event. His blood sugar is around 200 and above 200 he was started on 2 milligram of Amaryl and will increase Amaryl to 4 mg and if blood sugar remains same and above 150 we will restart him back on his metformin. He had excision debridement done yesterday and delayed primary closure of right anterior foot ulceration by podiatry. Will ask podiatry if there is any other procedure planned and also infectious disease as he is on vancomycin for discharge planning.       Donna Gloria MD  12/20/2017 2:35 PM

## 2017-12-21 VITALS
TEMPERATURE: 98.4 F | SYSTOLIC BLOOD PRESSURE: 139 MMHG | HEART RATE: 72 BPM | BODY MASS INDEX: 27.3 KG/M2 | WEIGHT: 195 LBS | DIASTOLIC BLOOD PRESSURE: 51 MMHG | OXYGEN SATURATION: 100 % | HEIGHT: 71 IN | RESPIRATION RATE: 16 BRPM

## 2017-12-21 LAB
ANION GAP SERPL CALCULATED.3IONS-SCNC: 12 MMOL/L (ref 9–17)
BUN BLDV-MCNC: 15 MG/DL (ref 8–23)
BUN/CREAT BLD: ABNORMAL (ref 9–20)
CALCIUM SERPL-MCNC: 8 MG/DL (ref 8.6–10.4)
CHLORIDE BLD-SCNC: 96 MMOL/L (ref 98–107)
CO2: 23 MMOL/L (ref 20–31)
CREAT SERPL-MCNC: 1.03 MG/DL (ref 0.7–1.2)
GFR AFRICAN AMERICAN: >60 ML/MIN
GFR NON-AFRICAN AMERICAN: >60 ML/MIN
GFR SERPL CREATININE-BSD FRML MDRD: ABNORMAL ML/MIN/{1.73_M2}
GFR SERPL CREATININE-BSD FRML MDRD: ABNORMAL ML/MIN/{1.73_M2}
GLUCOSE BLD-MCNC: 178 MG/DL (ref 75–110)
GLUCOSE BLD-MCNC: 218 MG/DL (ref 70–99)
GLUCOSE BLD-MCNC: 237 MG/DL (ref 75–110)
POTASSIUM SERPL-SCNC: 4 MMOL/L (ref 3.7–5.3)
SODIUM BLD-SCNC: 131 MMOL/L (ref 135–144)

## 2017-12-21 PROCEDURE — 80048 BASIC METABOLIC PNL TOTAL CA: CPT

## 2017-12-21 PROCEDURE — 2580000003 HC RX 258: Performed by: STUDENT IN AN ORGANIZED HEALTH CARE EDUCATION/TRAINING PROGRAM

## 2017-12-21 PROCEDURE — 6370000000 HC RX 637 (ALT 250 FOR IP): Performed by: STUDENT IN AN ORGANIZED HEALTH CARE EDUCATION/TRAINING PROGRAM

## 2017-12-21 PROCEDURE — 97116 GAIT TRAINING THERAPY: CPT

## 2017-12-21 PROCEDURE — 99232 SBSQ HOSP IP/OBS MODERATE 35: CPT | Performed by: INTERNAL MEDICINE

## 2017-12-21 PROCEDURE — 36415 COLL VENOUS BLD VENIPUNCTURE: CPT

## 2017-12-21 PROCEDURE — 97110 THERAPEUTIC EXERCISES: CPT

## 2017-12-21 PROCEDURE — 2580000003 HC RX 258: Performed by: EMERGENCY MEDICINE

## 2017-12-21 PROCEDURE — 99233 SBSQ HOSP IP/OBS HIGH 50: CPT | Performed by: INTERNAL MEDICINE

## 2017-12-21 PROCEDURE — 6360000002 HC RX W HCPCS: Performed by: EMERGENCY MEDICINE

## 2017-12-21 PROCEDURE — 82947 ASSAY GLUCOSE BLOOD QUANT: CPT

## 2017-12-21 RX ADMIN — ASPIRIN 81 MG: 81 TABLET, COATED ORAL at 10:19

## 2017-12-21 RX ADMIN — DILTIAZEM HYDROCHLORIDE 180 MG: 180 CAPSULE, COATED, EXTENDED RELEASE ORAL at 10:19

## 2017-12-21 RX ADMIN — VANCOMYCIN HYDROCHLORIDE 1250 MG: 1 INJECTION, POWDER, LYOPHILIZED, FOR SOLUTION INTRAVENOUS at 10:19

## 2017-12-21 RX ADMIN — GLIMEPIRIDE 4 MG: 2 TABLET ORAL at 10:19

## 2017-12-21 RX ADMIN — LISINOPRIL AND HYDROCHLOROTHIAZIDE 1 TABLET: 12.5; 2 TABLET ORAL at 10:19

## 2017-12-21 RX ADMIN — ATORVASTATIN CALCIUM 40 MG: 40 TABLET, FILM COATED ORAL at 10:19

## 2017-12-21 RX ADMIN — Medication 10 ML: at 10:19

## 2017-12-21 NOTE — CARE COORDINATION
Called and Faxed notes, MARY, and face sheet to [facility],  Informed of dc home today. Pt awaits RW delivery.
Consult received as pt requested to speak with SW  Pt admitted with foot pain  Met with pt who was alert and oriented, cooperative  Pt stated he would like to apply for SSI  Stated he is no longer working  Explained to pt that will provide him a phone number and he will have to call that number to get process started  Pt was in agreement  Provided pt with phone number to apply for SSI and encouraged him to call
Discharge plan: resume hc with [facility] currently on Vanco 1.5 gm iv  q 24 await final ATB plan at [facility].  Walking 50 ft No AD with surgical shoe    14:43 received order for walker from dr Singh Tran need order from attending and face to face note for medical necessity ps podiatry pager    16:00 walker order, facesheet and documents faxed to [facility]
faxed.         Electronically signed by Belle Grimes RN on 12/13/17 at 6:13 PM

## 2017-12-21 NOTE — PROGRESS NOTES
air entry  Heart[de-identified] normal rate, regular rhythm, normal S1, S2, no murmurs, rubs, clicks or gallops  Abdomen:  soft, nontender, nondistended, no masses or organomegaly  Extremities: postoperative dressing noted to right foot, non-palpable pedal pulses. Left 2nd distal tuft gangrenous       Medications:   Scheduled Medications   diltiazem  180 mg Oral Daily    glimepiride  4 mg Oral Daily with breakfast    atorvastatin  40 mg Oral Daily    lisinopril-hydrochlorothiazide  1 tablet Oral Daily    vancomycin  1,250 mg Intravenous Q12H    aspirin EC  81 mg Oral Daily    sodium chloride flush  10 mL Intravenous 2 times per day       PRN Medications    ammonium lactate  PRN   docusate sodium 100 mg BID PRN   ondansetron 4 mg Q8H PRN   povidone-iodine  PRN   sodium chloride flush 10 mL PRN   acetaminophen 650 mg Q4H PRN   magnesium hydroxide 30 mL Daily PRN   glucose 15 g PRN   dextrose 12.5 g PRN   glucagon (rDNA) 1 mg PRN   dextrose 100 mL/hr PRN       Diagnostic Labs and Imaging    CBC:  Recent Labs      12/18/17   1019   WBC  11.4*   HGB  11.4*   PLT  526*     BMP:   Recent Labs      12/19/17   0456  12/20/17   0602  12/21/17   0509   NA  133*  131*  131*   K  4.0  4.1  4.0   CL  97*  95*  96*   CO2  23  21  23   BUN  15  11  15   CREATININE  1.13  0.88  1.03   GLUCOSE  268*  235*  218*     Hepatic: No results for input(s): AST, ALT, ALB, BILITOT, ALKPHOS in the last 72 hours. INR: No results for input(s): INR in the last 72 hours. Troponin: No results for input(s): TROPONINI in the last 72 hours.     Assessment and Plan:   Principal Problem:    Diabetic gangrene ([facility]  Active Problems:    Type 2 diabetes mellitus with diabetic peripheral angiopathy and gangrene, without long-term current use of insulin (HCC)    Essential hypertension    Hyponatremia    Microcytic anemia    Gangrene of right foot (HCC)    MRSA (methicillin resistant Staphylococcus aureus) infection    Osteomyelitis of right foot ([facility]    -Discharge on Minocycline 100mg PO BID at discharge. Stop date 1-5-18. Follow up with Dr. Ousmane Cleveland in 4 weeks.  -Follow up with [facility] podiatry clinic within 1 week of discharge.  -Resume home blood pressure and diabetic medications  -Patient has 1 more physical therapy session today and will be discharge home with a walker.  -Patient will be discharged today at 1pm, patient has already arranged a ride for this time. José Luis Mukherjee, PGY-1  Internal Medicine Department  [facility], [address] [place] [address] Dr was evaluated today and a DME order was entered for a wheeled walker with seat because he requires this to successfully complete daily living tasks of ambulating. A wheeled walker with seat is necessary due to the patient's unsteady gait, upper body weakness, inability to  and ambulation device, ambulating only short distances by pushing a walker, and the need to sit for a short time before resuming ambulation. These tasks cannot be completed with a lesser ambulation device such as a cane, crutch, or standard walker. The need for this equipment was discussed with the patient and he understands and is in agreement.

## 2017-12-21 NOTE — PLAN OF CARE
Problem: Risk for Impaired Skin Integrity  Goal: Tissue integrity - skin and mucous membranes  Structural intactness and normal physiological function of skin and  mucous membranes.    Outcome: Ongoing      Problem: Falls - Risk of  Goal: Absence of falls  Outcome: Met This Shift      Problem: Nutrition  Goal: Optimal nutrition therapy  Outcome: Met This Shift      Problem: Pain:  Goal: Pain level will decrease  Pain level will decrease   Outcome: Met This Shift    Goal: Control of acute pain  Control of acute pain   Outcome: Met This Shift    Goal: Control of chronic pain  Control of chronic pain   Outcome: Met This Shift

## 2017-12-21 NOTE — PROGRESS NOTES
[facility] - Progress Note  Today's Date and Time: 12/21/2017, 10:40 AM    Impression :   1. Gangrene of 2nd and third toes of the right foot s/p amputation 12/15/17 and delayed primary closure on 12/19/2017  2. Superficial Gangrene of distal aspect 2nd left toe. 3. MRSA infection  4. DM 2  5. PAD  6. HTN    Recommendations   · Continue Vancomycin 1.5 gm q 24h until discharge. · Minocycline 100 mg po BID starting at time of discharge. Stop date 1-5-18. · Wound care  · Office f/up in 4 weeks with Dr Keren Duarte for infection. Please call [phone] for appointment    Chief complaint/reason for consultation:   Management for gangrene on right and left toes    History of Present Illness:     INITIAL HISTORY:    Yonis Esquivel is a 69y.o.-year-old  male who was initially admitted on 12/13/2017. The patient presented to the ED with a complaint of leg swelling and gangrene of his toes. Two weeks ago, the patient noticed the swelling and toe changes. Since he already had an appointment scheduled with Podiatry on 12/13/2017 to follow up his right foot wound, he decided to wait until then to get his feet examined. The patient was sent to the ED directly from the clinic because of the extent of the apparent gangrene. On 12/6/17, the patient visited the Podiatric clinic for a Diabetic foot examination. A few days prior, the patient noticed wounds on his right foot and drainage in his shoes. He was given Doxycycline for 10 days and referred to Vascular Surgery for PVRs. A follow up visit was scheduled for 12/13/2017. On 12/11/17, arterial PVR showed mild-moderate vascular insufficiency at rest bilaterally. 12/11 foot XR showed new bony destruction of 2nd and 3rd toes with stable partial destruction of the 1st toe.        In related Past HX: In 2015, the patient was admitted for left toe osteomyelitis and foot blisters, which were treated with Doxycycline reviewed the past medical history, past surgical history, medications, social history, and family history, and I have updated the database accordingly. Past Medical History:     Past Medical History:   Diagnosis Date    Hypertension     MRSA (methicillin resistant staph aureus) culture positive 07/01/2016    foot    Type II or unspecified type diabetes mellitus without mention of complication, not stated as uncontrolled        Past Surgical  History:     Past Surgical History:   Procedure Laterality Date    TOE AMPUTATION Right 12/16/2017    RIGHT 2ND AND 3RD DIGITS TOE AMPUTATION FOR WET GANGRENE performed by Geannie Osgood, DPM at [facility] OR       Medications:      diltiazem  180 mg Oral Daily    glimepiride  4 mg Oral Daily with breakfast    atorvastatin  40 mg Oral Daily    lisinopril-hydrochlorothiazide  1 tablet Oral Daily    vancomycin  1,250 mg Intravenous Q12H    aspirin EC  81 mg Oral Daily    sodium chloride flush  10 mL Intravenous 2 times per day       Social History:     Social History     Social History    Marital status: Single     Spouse name: N/A    Number of children: N/A    Years of education: N/A     Occupational History    Not on file. Social History Main Topics    Smoking status: Never Smoker    Smokeless tobacco: Never Used    Alcohol use No    Drug use: No    Sexual activity: Not on file     Other Topics Concern    Not on file     Social History Narrative    No narrative on file       Family History:     Family History   Problem Relation Age of Onset    Arthritis Mother     Diabetes Father         Allergies:   Review of patient's allergies indicates no known allergies. Review of Systems Updated: 12/21/2017   Constitutional: denied chills, fever, systemic complaints  Head: Denied H/A  Eyes: No vision changes or blurry vision  ENT: No sore throat or runny nose. No hearing loss. Cardiovascular: Denied palpitation, chest pain, SORIA  Lung: No shortness of breath.  No cough  Abdomen: Denied N/V/D/C  Genitourinary: No increased urinary frequency, or dysuria. Musculoskeletal: Negative for drainage. Negative for pain on palpation. S/p amputation. Hematologic: No bruising or bleeding  Neurologic: No headache, weakness, numbness, or tingling. Has little feeling in the area of the foot  Skin: no rashes    Physical Examination Updated: 2017     Patient Vitals for the past 8 hrs:   BP Temp Temp src Pulse Resp SpO2   17 0855 (!) 139/51 98.4 °F (36.9 °C) Oral 72 16 100 %   17 0432 (!) 149/60 98.8 °F (37.1 °C) Oral 69 16 100 %     Temp (24hrs), Av.5 °F (36.9 °C), Min:98.1 °F (36.7 °C), Max:98.9 °F (37.2 °C)      General Appearance: Awake, alert. No acute distress. Reports he feels fine. Mental status: Good mentation. AAO x3  Head:  Normocephalic, no trauma  ENT: sclera anicteric. Moist mucosa. No thrush or oral lesions. Poor dentition  Neck: Supple without lymphadenopathy  Pulmonary: Lung fields clear on auscultation. No rales, no rhonchi  Cardiovascular: Normal S1/S2, without murmurs  Abdomen: Soft, non tender, non distended. +BS. No organomegaly  Extremities: Warm, decreased perfusion. Right LE edema. S/p right 2nd/3rd digit amputation and delayed primary closure. Sutures in place. No drainage  Left foot: the tip of the 2nd digit appeared black. Stable. Negative for drainage. Neurologic: Decreased sensation in distal lower extremity digits. Skin: No rashes or other lesions. Medical Decision Making:   Labs    CBC with Differential:   No results for input(s): WBC, HGB, HCT, PLT, SEGSPCT, BANDSPCT, LYMPHOPCT, MONOPCT, EOSPCT in the last 72 hours. BMP:   Recent Labs      17   0602  17   0509   NA  131*  131*   K  4.1  4.0   CL  95*  96*   CO2  21  23   BUN  11  15   CREATININE  0.88  1.03       Hepatic Function Panel:   No results for input(s): PROT, LABALBU, BILIDIR, IBILI, BILITOT, ALKPHOS, ALT, AST in the last 72 hours.     No results for input(s): RPR in the last 72 hours. No results for input(s): HIV in the last 72 hours. No results for input(s): BC in the last 72 hours. Lab Results   Component Value Date    RBC 4.72 12/18/2017    WBC 11.4 12/18/2017       Lab Results   Component Value Date    CREATININE 1.03 12/21/2017    GLUCOSE 218 12/21/2017    GLUCOSE 132 12/29/2011     XR Right foot 12/11/2017  Impression   1. New bony destruction involving the 2nd and 3rd distal phalanges suggesting   osteomyelitis   2. Stable partial destruction of the 1st distal phalanx           Lower extremity Doppler 12/11/2017  Summary        Bilateral lower extremity ABIs and PVRs (with toe pressures ) were    performed for the evaluation of peripheral vascular disease. Study    demonstrates:        ABNORMAL        Right:    Mild to moderate vascular insufficiency at rest.        Left:    Mild to moderate vascular insufficiency at rest.     Lower extremity Doppler 9/26/2016   Summary        Normal PVR at rest of the bilateral lower extremity.    Normal PVR with exercise of the bilateral lower extremity.    Normal arterial PPG waveforms of the bilateral digits.    Severe small vessel and/or vasospastic disease of the left 2,3 digits. .................................................................................................................................... Thank you for allowing us to participate in the care of this patient. Please do not hesitate to call with questions.     Meseret Shen MD  12/21/2017

## 2017-12-21 NOTE — PROGRESS NOTES
Signs  Patient Currently in Pain: Denies       Orientation  Orientation  Overall Orientation Status: Within Normal Limits  Objective      Transfers  Sit to Stand: Supervision  Stand to sit: Supervision (good safety awareness)  Ambulation  Ambulation?: Yes  Ambulation 1  Surface: level tile  Device: Rolling Walker  Assistance: Stand by assistance  Quality of Gait: decreased catherine, lateral sway d/t surgical shoe, 1 small LOB but able to self correct  Distance: 300ft  Stairs/Curb  Stairs?: Yes  Stairs  # Steps : 20  Stairs Height: 6\"  Rails: Right ascending  Device: No Device  Assistance: Stand by assistance  Comment: good recall of correct sequencing     Balance  Posture: Good  Sitting - Static: Good  Sitting - Dynamic: Good  Standing - Static: Good  Standing - Dynamic: Fair       Exercises:  Seated LE exercise program: Long Arc Quads, hip abduction/adduction, heel/toe raises, and marches. Reps: 20x each with 2#  Upper extremity exercises: Bicep curl, shoulder flexion/extension, punches, tricep curl, shoulder abduction/adduction. Reps: 20x each with 2#    Assessment   Body structures, Functions, Activity limitations: Decreased functional mobility ; Decreased balance;Decreased endurance  Assessment: Improved ambulation distance in orthowedge shoe; displays 1 LOB but able to self correct. Pt demos good safety awareness with transfers and stair navigation. Safe to return home with assist from family. Prognosis: Good  REQUIRES PT FOLLOW UP: Yes  Activity Tolerance  Activity Tolerance: Patient Tolerated treatment well     Goals  Short term goals  Time Frame for Short term goals: 6 visits  Short term goal 1: Independent transfers  Short term goal 2: Independent ambulation without device 600 ft  Short term goal 3: Independent on 12 stairs with 1 rail. Short term goal 4: improve standing dynamic balance to Good.   Patient Goals   Patient goals : Go home soon    Plan    Plan  Times per week: 5-6x/week  Specific instructions

## 2017-12-22 ENCOUNTER — TELEPHONE (OUTPATIENT)
Dept: PULMONOLOGY | Age: 67
End: 2017-12-22

## 2017-12-22 DIAGNOSIS — M86.9 OSTEOMYELITIS OF RIGHT FOOT, UNSPECIFIED TYPE (HCC): Primary | ICD-10-CM

## 2017-12-22 DIAGNOSIS — S98.131A AMPUTATED TOE OF RIGHT FOOT (HCC): ICD-10-CM

## 2017-12-22 NOTE — TELEPHONE ENCOUNTER
Message   Received: Today   Message Contents   VinceMD [facility] Clinical Staff          Previous Messages      Vincechristy Bergeron MD In 4 weeks.      Specialty: Infectious Diseases   Contact information:   [facility], [address] [address] [phone]

## 2017-12-27 ENCOUNTER — OFFICE VISIT (OUTPATIENT)
Dept: PODIATRY | Age: 67
End: 2017-12-27
Payer: COMMERCIAL

## 2017-12-27 VITALS
HEIGHT: 71 IN | HEART RATE: 81 BPM | SYSTOLIC BLOOD PRESSURE: 118 MMHG | BODY MASS INDEX: 27.31 KG/M2 | WEIGHT: 195.11 LBS | DIASTOLIC BLOOD PRESSURE: 68 MMHG

## 2017-12-27 DIAGNOSIS — M86.9 OSTEOMYELITIS OF RIGHT FOOT, UNSPECIFIED TYPE (HCC): Primary | ICD-10-CM

## 2017-12-27 DIAGNOSIS — I73.9 PAD (PERIPHERAL ARTERY DISEASE) (HCC): ICD-10-CM

## 2017-12-27 DIAGNOSIS — S98.131A AMPUTATED TOE OF RIGHT FOOT (HCC): ICD-10-CM

## 2017-12-27 DIAGNOSIS — Z98.890 S/P FOOT SURGERY, RIGHT: ICD-10-CM

## 2017-12-27 DIAGNOSIS — E11.40 TYPE 2 DIABETES MELLITUS WITH DIABETIC NEUROPATHY, UNSPECIFIED LONG TERM INSULIN USE STATUS: ICD-10-CM

## 2017-12-27 PROCEDURE — 99024 POSTOP FOLLOW-UP VISIT: CPT | Performed by: PODIATRIST

## 2017-12-27 NOTE — OP NOTE
[address]                                 OPERATIVE REPORT    PATIENT NAME: Jimena Ryan                    :        1950  MED REC NO:   [id number]                             ROOM:       9847  ACCOUNT NO:   [de-identified]                           ADMIT DATE: 2017  PROVIDER:     Jose Reyes    DATE OF PROCEDURE:  2017    SURGEON:  Ryley Head DPM    ASSISTANT:  Jose Reyes, PGY-1    PREOPERATIVE DIAGNOSES:  1. Ulceration, anterior right foot, down to and including fascial and  plantar plate structures, right foot. 2.  Diabetes with peripheral vascular disease and peripheral neuropathy. POSTOPERATIVE DIAGNOSES:  1. Ulceration, anterior right foot, down to and including fascial and  plantar plate structures, right foot. 2.  Diabetes with peripheral vascular disease and peripheral neuropathy. PROCEDURE:  1.  Sharp excisional debridement down to and including fascia and plantar  plate structures, right anterior foot ulceration. 2.  Delayed primary closure of right anterior foot ulceration. ANESTHESIA:  MAC with local of 8 mL of 1:1 mix of 0.5% Marcaine plain and  1% lidocaine plain. HEMOSTASIS:  None. ESTIMATED BLOOD LOSS:  Less than 15 mL. MATERIALS:  2-0 Vicryl, 2-0 nylon, 4-0 nylon. INJECTABLES:  None. SPECIMENS:  None. COMPLICATIONS:  None. FINDINGS:  Pre-debridement measurements of 3.0 x 3.0 x 1.8 cm and  post-debridement measurements of 3.2 x 3.2 x 2.0 cm of the ulceration,  right anterior foot. INDICATIONS FOR PROCEDURE:  The patient is a 22-year-old male well-known to  Dr. Amparo Quiroz, who presented with wet gangrene of the second digit, right  foot. The patient had a debridement procedure done, in which the second  digit was debrided with removal of phalangeal bones of the second digit.    The wound was left open and the patient was admitted to the [facility].  Dr. Arielle Fernández had recommended surgical treatment to the patient, to which the  patient is amenable. Preoperatively, all risks and rewards of the  aforementioned procedure were discussed at length prior to the patient  signing the consent form, which was witnessed by a nurse and placed in his  chart. The right foot was marked, antibiotics hung, labs and imaging  reviewed, and n.p.o. status was confirmed. No guarantees were given or  implied. OPERATIVE REPORT IN DETAIL:  The patient was brought to the operating room  and placed on the operating table in the supine position. Along with IV  sedation under MAC, local anesthesia was obtained utilizing 8 mL of a 1:1  mix of 0.5% Marcaine plain and 1% lidocaine plain. The foot was then  scrubbed, prepped, and draped in the usual aseptic manner. A tourniquet  was not used for this procedure. Attention was directed to the open ulceration to the anterior aspect of the  right foot. A tennis racquet incision was made, excising excess necrotic  skin. This was done using a #15 blade. The pre-debridement measurement was 3.0 x 3.0 x 1.8 cm. Remaining soft tissue structures  that appeared nonviable and necrotic were debrided sharply and excised  using a #15 blade. The fascial and plantar plate structures that were present within the wound were  also excisionally debrided. The surgical site was then copiously irrigated  using pulse lavage and 3 liters of saline. The surgical incision was then closed in layers using 2-0 Vicryl, 2-0  nylon, and 4-0 nylon. The closure flaps were well approximated when closure was completed. Betadine-soaked Adaptic was applied to the  incision. The incision was then dressed using 4 x 4's, Kerlix, and a light  Ace wrap. The patient tolerated the procedure well and was transported to the  recovery room with all vital signs stable and vascular status intact to the  right foot and ankle.   Following postoperative monitoring, the patient will  be transferred to the med/surg floor. Podiatry will follow up daily with  the patient and perform dressing changes. The patient is to keep the  dressing clean, dry, and intact.         Yunior Arteaga    D: 12/27/2017 7:14:47       T: 12/27/2017 9:08:06     [email or web address]  Job#: [id number]     Doc#: [id number]    CC:

## 2017-12-27 NOTE — PROGRESS NOTES
[facility] Progress Note     Subjective:     Michael Sadler is a 69 y.o. male presenting for follow up debridement ulcer right foot down to level of fascia and plantar plate structures with delayed primary closure of right anterior foot ulceration per Amaris. Ranker/Darren (DOS 12/21/17). Patient states [facility] has been changing his dressings daily. He states he Left 2nd digit is still darkened but has not progressed in discoloration and denies increased ischemic pain. He has been working and has had drainage in his shoes and socks. Denies n/v/f/c. Objective:   Vascular: DP and PT pulses palpable, Right. DP and PT pulses non-palpable, Left. Biphasic DP and PT, Right. Monophasic DP and PT, Left. Hair growth absent to the level of the digits, Bilateral.  CFT <5 seconds digits 1-5, Bilateral. MEI: Right: 0.97. Left: 0.97. Neurological: Light touch sensation diminished to the digits, Bilateral.       Musculoskeletal: Muscle strength 5/5, Right. S/p Right 2nd and 3rd digit amputation. Hammer digit deformity of 2-5 bilaterally. No tenderness to RLE.        Integument:  Surgical incision to Right foot well coapted with nylon sutures. No ludivina-incisional rubor, edema, mal odor, or drainage, or increased calor. There is dark discoloration starting at the tip of the left 2nd digit as well. No wounds on the left. XR Foot R 12/15/17: osteolysis of distal phalanx of right 2nd and 3rd digits. Assessment:     1. Osteomyelitis of right foot, unspecified type ([address]     2. Amputated toe of right foot ([address]     3. Type 2 diabetes mellitus with diabetic neuropathy, unspecified long term insulin use status ([address]     4. PAD (peripheral artery disease) ([address]     5. S/P foot surgery, right          Plan:   · Patient seen and evaluated. · Discussed need for angio Left leg in future as dry gangrene is starting to Left 2nd digit. · No debridement of wounds.   · Instructed on the importance of good control of blood

## 2017-12-27 NOTE — LETTER
[facility] [address] [facility] [address]  Phone: [phone]  Fax: [phone]               December 27, 2017    Patient: Lexy Fung   YOB: 1950   Date of Visit: 12/27/2017       To Whom It May Concern:    Carmen Menendez was seen and treated in our clinic today. He was following up for Right foot surgery involving amputation of toes that took place on December 21, 2017. His next appointment will take place on January 3, 2018. We are awaiting healing of surgical incision and will plan to get him custom diabetic shoes with prosthetic toe filler. We are monitoring dry gangrene on his Left foot. Vascular surgery may need to repeat a surgery to improve his blood flow. We estimate his time to return to work to be around March or April 2018.       Sincerely,       Randal Dunbar DPM         Signature:__________________________________

## 2017-12-29 NOTE — DISCHARGE SUMMARY
[address] [place] [address]     Department of Internal Medicine - Staff Internal Medicine Service    INPATIENT DISCHARGE SUMMARY      PATIENT IDENTIFICATION:  NAME:  Jo Ann Pineda   :   1950  MRN:    [id number]     Acct:    [de-identified]   Admit Date:  2017  Discharge date:  2017  2:18 PM   Attending Provider: No att. providers found                                     REASON FOR HOSPITALIZATION:   Jo Ann Pineda is a 69 y.o. male who presented with wet gangrene right second and third toes    DIAGNOSES:  Primary:   Gangrene ([address] Colindres Messing  Diabetic gangrene ([address] [E11.52]    Secondary: Active Hospital Problems    Diagnosis Date Noted    Osteomyelitis of right foot ([address] [M86.9]     Gangrene of right foot ([address] Colindres Messing     MRSA (methicillin resistant Staphylococcus aureus) infection [A49.02]     Diabetic gangrene ([address] [E11.52] 2017    Type 2 diabetes mellitus with diabetic peripheral angiopathy and gangrene, without long-term current use of insulin ([address] [E11.52] 2017    Essential hypertension [I10] 2017    Hyponatremia [E87.1] 2017    Microcytic anemia [D50.9] 2017       TREATMENT:  Brief Inpatient Course: This 38-year-old male patient presented with white gangrene of digits 2 and 3 to the right foot. Patient has a past medical history of diabetes mellitus, hypertension. Patient was sent by his podiatrist to the ED for vascular workup of right foot gangrene. Patient underwent right lower extremity angiogram which revealed adequate flow for patient to heal amputation digits. Patient underwent amputations of digits 2 and 3 of the right foot. Patient was treated with IV antibiotics. Patient's diabetes and hypertension were managed with home medications. Patient was discharged in stable condition.      Consults:   vascular surgery, podiatry     Procedures:  Right second and third toe indications    Any Hospital Acquired Infections: None    PATIENT'S DISCHARGE CONDITION:      PATIENT/FAMILY INSTRUCTIONS:   Discharge Medication List as of 12/21/2017  1:34 PM      CONTINUE these medications which have CHANGED    Details   minocycline (MINOCIN;DYNACIN) 100 MG capsule Take 1 capsule by mouth 2 times daily for 16 days, Disp-32 capsule, R-0Print         CONTINUE these medications which have NOT CHANGED    Details   ammonium lactate (AMLACTIN) 12 % cream APPLY TOPICALLY TO THE AFFECTED AREA AS NEEDED, Disp-140 g, R-0, Normal      Gauze Pads & Dressings (KERLIX GAUZE ROLL LARGE) MISC DAILY Starting 7/6/2016, Until Wed 7/13/16, Disp-7 each, R-0, Print      Gauze Pads & Dressings (GAUZE DRESSING) 4\"X4\" PADS DAILY Starting 7/6/2016, Until Discontinued, Disp-10 each, R-0, Print      povidone-iodine (BETADINE) 10 % external solution Apply topically as needed. , Disp-1 Bottle, R-0, Print      Offloading Shoe MISC Starting 7/1/2016, Until Discontinued, Disp-1 each, R-0, PrintPlease dispense 1 surgical offloading shoe Right foot      ondansetron (ZOFRAN) 4 MG tablet Take 4 mg by mouth every 8 hours as needed for Nausea or Vomiting      aspirin EC 81 MG EC tablet Historical Med      LIPITOR 20 MG tablet Historical Med      diltiazem (CARDIZEM CD) 300 MG ER capsule Historical Med      docusate sodium (COLACE) 100 MG capsule Historical Med      glimepiride (AMARYL) 4 MG tablet Historical Med      TRUETRACK TEST strip Historical Med      lisinopril-hydrochlorothiazide (PRINZIDE;ZESTORETIC) 20-12.5 MG per tablet Historical Med      metformin (GLUCOPHAGE) 1000 MG tablet Historical Med         STOP taking these medications       doxycycline hyclate (VIBRA-TABS) 100 MG tablet Comments:   Reason for Stopping:         urea (CARMOL) 10 % cream Comments:   Reason for Stopping:         Ciclopirox (LOPROX) 0.77 % gel Comments:   Reason for Stopping:         haloperidol decanoate (HALDOL DECANOATE) 100 MG/ML injection Comments:   Reason for Stopping:         glipiZIDE (GLUCOTROL) 10 MG tablet

## 2018-01-03 ENCOUNTER — OFFICE VISIT (OUTPATIENT)
Dept: PODIATRY | Age: 68
End: 2018-01-03
Payer: COMMERCIAL

## 2018-01-03 VITALS
WEIGHT: 191 LBS | BODY MASS INDEX: 26.74 KG/M2 | TEMPERATURE: 97.6 F | HEART RATE: 83 BPM | SYSTOLIC BLOOD PRESSURE: 138 MMHG | HEIGHT: 71 IN | DIASTOLIC BLOOD PRESSURE: 68 MMHG

## 2018-01-03 DIAGNOSIS — I73.9 PAD (PERIPHERAL ARTERY DISEASE) (HCC): ICD-10-CM

## 2018-01-03 DIAGNOSIS — M79.672 PAIN IN BOTH FEET: ICD-10-CM

## 2018-01-03 DIAGNOSIS — M79.671 PAIN IN BOTH FEET: ICD-10-CM

## 2018-01-03 DIAGNOSIS — M86.9 OSTEOMYELITIS OF RIGHT FOOT, UNSPECIFIED TYPE (HCC): Primary | ICD-10-CM

## 2018-01-03 DIAGNOSIS — E11.40 TYPE 2 DIABETES MELLITUS WITH DIABETIC NEUROPATHY, UNSPECIFIED LONG TERM INSULIN USE STATUS: ICD-10-CM

## 2018-01-03 PROCEDURE — 99024 POSTOP FOLLOW-UP VISIT: CPT | Performed by: PODIATRIST

## 2018-01-03 NOTE — PROGRESS NOTES
[facility] Podiatry Clinic Progress Note     Subjective:     Jonathan Little is a 69 y.o. male presenting for follow up debridement ulcer right foot down to level of fascia and plantar plate structures with delayed primary closure of right anterior foot ulceration per Drs. Ranker/Darren (DOS 12/21/17). Patient states [facility] has been changing his dressings daily. He states he Left 2nd digit is still darkened but has not progressed in discoloration. Pt states he has not been working. Patient states that he did not have any drainage. Pt states that he has not followed up with vascular surgery. Denies n/v/f/c. Objective:   Vascular: DP and PT pulses palpable, Right. DP and PT pulses non-palpable, Left. Biphasic DP and PT, Right. Monophasic DP and PT, Left. Hair growth absent to the level of the digits, Bilateral.  CFT <5 seconds digits 1-5, Bilateral. MEI: Right: 0.97. Left: 0.97. Neurological: Light touch sensation diminished to the digits, Bilateral.       Musculoskeletal: Muscle strength 5/5, Right. S/p Right 2nd and 3rd digit amputation. Hammer digit deformity of 2-5 bilaterally. No tenderness to RLE.        Integument:  Surgical incision to Right foot well coapted with nylon sutures. No ludivina-incisional rubor, edema, mal odor, or drainage, or increased calor. There is dark discoloration starting at the tip of the left 2nd digit as well. No wounds on the left. XR Foot R 12/15/17: osteolysis of distal phalanx of right 2nd and 3rd digits. Assessment:   Pt is a 69 y.o. male with     1. Osteomyelitis of right foot, unspecified type ([address]     2. PAD (peripheral artery disease) ([facility])     3. Pain in both feet     4. Type 2 diabetes mellitus with diabetic neuropathy, unspecified long term insulin use status ([address]          Plan:   · Patient seen and evaluated. · Discussed need for angio Left leg in future as dry gangrene is starting to Left 2nd digit. Pt instructed to make vascular appointment asap. · Instructed on the importance of good control of blood glucose levels for vascular, neurologic, and overall health. · Removal of all sutures right foot. · Applied betadine, 4x4s, kerlix, and paper tape. No ACE. · Pt okay to start showering. · Letter for [employer] written. · RTC 2 week for follow up.     Electronically signed by Chemo Connor DPM on 1/3/2018 at 4:06 PM

## 2018-01-03 NOTE — PROGRESS NOTES
Patient instructed to remove shoes and socks, instructed to sit in exam chair. Current PCP name is Dr. Williams Head and date of last visit was December 2017. Do you have a follow up visit scheduled?   Yes

## 2018-01-12 ENCOUNTER — INITIAL CONSULT (OUTPATIENT)
Dept: VASCULAR SURGERY | Age: 68
End: 2018-01-12
Payer: COMMERCIAL

## 2018-01-12 VITALS
OXYGEN SATURATION: 98 % | WEIGHT: 190.92 LBS | HEART RATE: 79 BPM | DIASTOLIC BLOOD PRESSURE: 78 MMHG | BODY MASS INDEX: 26.73 KG/M2 | SYSTOLIC BLOOD PRESSURE: 132 MMHG | HEIGHT: 71 IN | RESPIRATION RATE: 18 BRPM

## 2018-01-12 DIAGNOSIS — I96 GANGRENE OF RIGHT FOOT (HCC): ICD-10-CM

## 2018-01-12 DIAGNOSIS — E11.52 TYPE 2 DIABETES MELLITUS WITH DIABETIC PERIPHERAL ANGIOPATHY AND GANGRENE, WITHOUT LONG-TERM CURRENT USE OF INSULIN (HCC): Primary | ICD-10-CM

## 2018-01-12 PROCEDURE — 99213 OFFICE O/P EST LOW 20 MIN: CPT | Performed by: SURGERY

## 2018-01-12 NOTE — PROGRESS NOTES
[id number] [facility] PHYSICIANS  [facility] VASCULAR SURGERY    Asif Nicole  1950  69 y.o.male       Chief Complaint:  Chief Complaint   Patient presents with    Consultation     Referred by Dr. Toño Villafuerte for PAD. Hx: toe amputation right foot, gangrene left foot       History of present Il lness:  Pt is here today for a post-angio follow up. He was admitted in December 2017 for gangrene of his right foot and diabetic foot infection. He underwent an angiogram which showed flow through the posterior tibial artery filling the pedal arch. This has proven to be adequate perfusion to heal his 2nd - 5th toe amputations. He continues to have home health care dress his right toe amputation site and he wears a surgical shoe. There has been no drainage whatsoever. Past Medical History:  has a past medical history of Hypertension; MRSA (methicillin resistant staph aureus) culture positive; and Type II or unspecified type diabetes mellitus without mention of complication, not stated as uncontrolled. Past Surgical History:   Past Surgical History:   Procedure Laterality Date    OR DEEP DISSEC FOOT INFEC,1 BURSA N/A 12/19/2017    DEBRIDMENT AND DELAYED PRIMARY CLOSURE RIGHT FOOT WOUND performed by Kinsey Waters DPM at [facility] [address] TOE AMPUTATION Right 12/16/2017    RIGHT 2ND AND 3RD DIGITS TOE AMPUTATION FOR WET GANGRENE performed by Kinsey Waters DPM at [address] [facility] History:  reports that he has never smoked. He has never used smokeless tobacco. He reports that he does not drink alcohol or use drugs. Family History: family history includes Arthritis in his mother; Diabetes in his father.     Review of Systems:   Constitutional:  negative for  fevers, chills, sweats, fatigue, and weight loss  HEENT:  negative for vision or hearing changes,   Respiratory:  negative for shortness of breath, cough, or congestion  Cardiovascular:  negative for  chest pain,

## 2018-01-23 ENCOUNTER — OFFICE VISIT (OUTPATIENT)
Dept: INFECTIOUS DISEASES | Age: 68
End: 2018-01-23
Payer: COMMERCIAL

## 2018-01-23 VITALS
HEIGHT: 71 IN | RESPIRATION RATE: 14 BRPM | WEIGHT: 195 LBS | HEART RATE: 75 BPM | DIASTOLIC BLOOD PRESSURE: 55 MMHG | OXYGEN SATURATION: 100 % | BODY MASS INDEX: 27.3 KG/M2 | SYSTOLIC BLOOD PRESSURE: 114 MMHG | TEMPERATURE: 96.8 F

## 2018-01-23 DIAGNOSIS — E11.51 DM (DIABETES MELLITUS) TYPE II CONTROLLED PERIPHERAL VASCULAR DISORDER: ICD-10-CM

## 2018-01-23 DIAGNOSIS — I96 GANGRENE OF TOE OF LEFT FOOT (HCC): ICD-10-CM

## 2018-01-23 DIAGNOSIS — I96 GANGRENE OF TOE OF RIGHT FOOT (HCC): ICD-10-CM

## 2018-01-23 DIAGNOSIS — I10 ESSENTIAL HYPERTENSION: ICD-10-CM

## 2018-01-23 DIAGNOSIS — S98.131A AMPUTATED TOE OF RIGHT FOOT (HCC): ICD-10-CM

## 2018-01-23 DIAGNOSIS — A49.02 MRSA (METHICILLIN RESISTANT STAPHYLOCOCCUS AUREUS) INFECTION: ICD-10-CM

## 2018-01-23 DIAGNOSIS — E08.621 DIABETIC ULCER OF TOE ASSOCIATED WITH DIABETES MELLITUS DUE TO UNDERLYING CONDITION, WITH FAT LAYER EXPOSED, UNSPECIFIED LATERALITY (HCC): Primary | ICD-10-CM

## 2018-01-23 DIAGNOSIS — L97.502 DIABETIC ULCER OF TOE ASSOCIATED WITH DIABETES MELLITUS DUE TO UNDERLYING CONDITION, WITH FAT LAYER EXPOSED, UNSPECIFIED LATERALITY (HCC): Primary | ICD-10-CM

## 2018-01-23 PROCEDURE — 99215 OFFICE O/P EST HI 40 MIN: CPT | Performed by: INTERNAL MEDICINE

## 2018-01-23 NOTE — PROGRESS NOTES
[facility] - Office Progress Note  Today's Date and Time: 1/24/2018, 8:32 PM    Diagnostic Impression :     1. Diabetic ulcer of toe associated with diabetes mellitus due to underlying condition, with fat layer exposed, unspecified laterality ([address]    2. Gangrene of toe of right foot ([address]    3. Gangrene of toe of left foot ([address]    4. DM (diabetes mellitus) type II controlled peripheral vascular disorder ([address]    5. MRSA (methicillin resistant Staphylococcus aureus) infection    6. Essential hypertension    7. Amputated toe of right foot ([address]    8. Toe amputation status, right ([address]       1. Gangrene of 2nd and third toes of the right foot s/p amputation 12/15/17 and delayed primary closure on 12/19/2017  2. Superficial Gangrene of distal aspect 2nd left toe. Recommendations   · Follow up with vascular clinic as directed for left foot ulcer  · Follow up with Podiatry for work boots, new orthopedic shoe, trimming of toenails and care of left foot ulcer  · Return to ID clinic in 3 months for follow up    Chief complaint/reason for consultation:     Chief Complaint   Patient presents with    Other     bilat toe infection- post amputation x 2 toes rt foot- SV        History of Present Illness:   Metro Hashimoto is a 69y.o.-year-old AA male who was evaluated on 1/23/2018. Patient came to the office for follow up after amputation of 2nd/3rd digit on right foot with delayed primary closure. Patient is  S/p Gangrene of 2nd and third toes of the right foot with MRSA infection. He underwent amputation on 12/15/17 and delayed primary closure on 12/19/2017. He had also developed an area of superficial Gangrene of distal aspect of the 2nd left toe. Patient was treated with Vancomycin during the early phase of treatment and had then been receiving treatment at home with minocycline.     Since discharge he has completed his course of minocycline, has had home wound care, and Past Surgical  History:     Past Surgical History:   Procedure Laterality Date    [place] DEEP DISSEC FOOT INFEC,1 BURSA N/A 12/19/2017    DEBRIDMENT AND DELAYED PRIMARY CLOSURE RIGHT FOOT WOUND performed by Irena Pettit DPM at [address] [place] [address] TOE AMPUTATION Right 12/16/2017    RIGHT 2ND AND 3RD DIGITS TOE AMPUTATION FOR WET GANGRENE performed by Irena Pettit DPM at [facility] OR       Medications:     Current Outpatient Prescriptions:     Misc. Devices (WALKER) MISC, 1 each by Does not apply route daily, Disp: 1 each, Rfl: 0    Misc. Devices ([facility]) MISC, 2 each by Does not apply route as needed (prn), Disp: 2 each, Rfl: 0    ammonium lactate (AMLACTIN) 12 % cream, APPLY TOPICALLY TO THE AFFECTED AREA AS NEEDED, Disp: 140 g, Rfl: 0    Gauze Pads & Dressings (GAUZE DRESSING) 4\"X4\" PADS, 1 each by Does not apply route daily, Disp: 10 each, Rfl: 0    Offloading Shoe MISC, by Does not apply route Please dispense 1 surgical offloading shoe Right foot, Disp: 1 each, Rfl: 0    ondansetron (ZOFRAN) 4 MG tablet, Take 4 mg by mouth every 8 hours as needed for Nausea or Vomiting, Disp: , Rfl:     aspirin EC 81 MG EC tablet, , Disp: , Rfl:     LIPITOR 20 MG tablet, , Disp: , Rfl:     diltiazem (CARDIZEM CD) 300 MG ER capsule, , Disp: , Rfl:     docusate sodium (COLACE) 100 MG capsule, , Disp: , Rfl:     glimepiride (AMARYL) 4 MG tablet, , Disp: , Rfl:     TRUETRACK TEST strip, , Disp: , Rfl:     lisinopril-hydrochlorothiazide (PRINZIDE;ZESTORETIC) 20-12.5 MG per tablet, , Disp: , Rfl:     metformin (GLUCOPHAGE) 1000 MG tablet, , Disp: , Rfl:     Gauze Pads & Dressings (KERLIX GAUZE ROLL LARGE) MISC, 1 each by Does not apply route daily for 7 days, Disp: 7 each, Rfl: 0    povidone-iodine (BETADINE) 10 % external solution, Apply topically as needed. , Disp: 1 Bottle, Rfl: 0     Social History:     Social History     Social History    Marital status: Single     Spouse name: N/A    Number of children: N/A

## 2018-01-31 ENCOUNTER — OFFICE VISIT (OUTPATIENT)
Dept: PODIATRY | Age: 68
End: 2018-01-31
Payer: COMMERCIAL

## 2018-01-31 VITALS
DIASTOLIC BLOOD PRESSURE: 68 MMHG | SYSTOLIC BLOOD PRESSURE: 125 MMHG | BODY MASS INDEX: 27.44 KG/M2 | TEMPERATURE: 97.5 F | HEIGHT: 71 IN | WEIGHT: 196 LBS | HEART RATE: 70 BPM

## 2018-01-31 DIAGNOSIS — B35.1 ONYCHOMYCOSIS: ICD-10-CM

## 2018-01-31 DIAGNOSIS — M20.42 HAMMER TOES OF BOTH FEET: ICD-10-CM

## 2018-01-31 DIAGNOSIS — M20.41 HAMMER TOES OF BOTH FEET: ICD-10-CM

## 2018-01-31 DIAGNOSIS — E11.40 TYPE 2 DIABETES MELLITUS WITH DIABETIC NEUROPATHY, UNSPECIFIED LONG TERM INSULIN USE STATUS: Primary | ICD-10-CM

## 2018-01-31 DIAGNOSIS — L97.521 TOE ULCER, LEFT, LIMITED TO BREAKDOWN OF SKIN (HCC): ICD-10-CM

## 2018-01-31 DIAGNOSIS — I73.9 PAD (PERIPHERAL ARTERY DISEASE) (HCC): ICD-10-CM

## 2018-01-31 DIAGNOSIS — Z98.890 S/P FOOT SURGERY, RIGHT: ICD-10-CM

## 2018-01-31 PROCEDURE — 11042 DBRDMT SUBQ TIS 1ST 20SQCM/<: CPT | Performed by: PODIATRIST

## 2018-01-31 PROCEDURE — 11721 DEBRIDE NAIL 6 OR MORE: CPT | Performed by: PODIATRIST

## 2018-01-31 NOTE — PROGRESS NOTES
[facility] Podiatry Clinic Progress Note     Subjective:   Peña Natarajan is a 69 y.o. male presenting for follow up of right foot digit amputations. Patient states [facility] has been changing his dressings daily. He states he Left 2nd digit is still darkened but has not progressed in discoloration. Pt states he has not been working. Patient states that he did not have any drainage. He requests diabetic shoes with steel toes. Denies n/v/f/c. Objective:   Vascular: DP and PT pulses palpable, Right. DP and PT pulses weakly palpable, Left. Biphasic DP and PT, Right. Monophasic DP and PT, Left. Hair growth absent to the level of the digits, Bilateral.  CFT <5 seconds digits 1-5, Bilateral. MEI: Right: 0.97. Left: 0.97. Neurological: Light touch sensation diminished to the digits, Bilateral.       Musculoskeletal: Muscle strength 5/5, Right. S/p Right 2nd and 3rd digit amputation. Hammer digit deformity of 2-5 bilaterally. No tenderness to RLE.        Integument:  Surgical site Right foot healed. No ludivina-incisional rubor, edema, mal odor, or drainage, or increased calor. There is dark discoloration starting at the tip of the left 2nd digit as well. There is a superficial ulcer on the left 2nd digit tip with mild hyperkeratotic rim. Assessment:     1. Type 2 diabetes mellitus with diabetic neuropathy, unspecified long term insulin use status ([facility])  [address]95 - CA DEBRIDEMENT OF NAILS, 6 OR MORE    CA DEBRIDEMENT, SKIN, SUB-Q TISSUE,=<20 SQ CM   2. PAD (peripheral artery disease) ([facility])  55942 - CA DEBRIDEMENT OF NAILS, 6 OR MORE    CA DEBRIDEMENT, SKIN, SUB-Q TISSUE,=<20 SQ CM   3. S/P foot surgery, right     4. Onychomycosis  04983 - CA DEBRIDEMENT OF NAILS, 6 OR MORE   5. Toe ulcer, left, limited to breakdown of skin ([facility])  CA DEBRIDEMENT, SKIN, SUB-Q TISSUE,=<20 SQ CM   6. Hammer toes of both feet  CA DEBRIDEMENT, SKIN, SUB-Q TISSUE,=<20 SQ CM      Plan:   · Patient seen and evaluated.   · Instructed on the importance of
follow up   [] BP is controlled <140/90     Order labs as PCP ordered. (ie: Lipids, A1C, CMP)    Patient instructed to remove shoes and socks, instructed to sit in exam chair. Current PCP name is Makayla Fernandez MD and date of last visit 12/2017. Do you have a follow up visit scheduled?   Yes

## 2018-02-14 ENCOUNTER — OFFICE VISIT (OUTPATIENT)
Dept: PODIATRY | Age: 68
End: 2018-02-14
Payer: COMMERCIAL

## 2018-02-14 VITALS
HEART RATE: 87 BPM | SYSTOLIC BLOOD PRESSURE: 123 MMHG | BODY MASS INDEX: 28.25 KG/M2 | DIASTOLIC BLOOD PRESSURE: 67 MMHG | HEIGHT: 71 IN | WEIGHT: 201.8 LBS

## 2018-02-14 DIAGNOSIS — I73.9 PAD (PERIPHERAL ARTERY DISEASE) (HCC): ICD-10-CM

## 2018-02-14 DIAGNOSIS — L97.521 TOE ULCER, LEFT, LIMITED TO BREAKDOWN OF SKIN (HCC): ICD-10-CM

## 2018-02-14 DIAGNOSIS — S98.131A AMPUTATED TOE OF RIGHT FOOT (HCC): ICD-10-CM

## 2018-02-14 DIAGNOSIS — E11.40 TYPE 2 DIABETES MELLITUS WITH DIABETIC NEUROPATHY, UNSPECIFIED LONG TERM INSULIN USE STATUS: Primary | ICD-10-CM

## 2018-02-14 DIAGNOSIS — Z98.890 S/P FOOT SURGERY, RIGHT: ICD-10-CM

## 2018-02-14 DIAGNOSIS — L84 PRE-ULCERATIVE CORN OR CALLOUS: ICD-10-CM

## 2018-02-14 DIAGNOSIS — M20.42 HAMMER TOES OF BOTH FEET: ICD-10-CM

## 2018-02-14 DIAGNOSIS — M20.41 HAMMER TOES OF BOTH FEET: ICD-10-CM

## 2018-02-14 PROCEDURE — 99024 POSTOP FOLLOW-UP VISIT: CPT | Performed by: STUDENT IN AN ORGANIZED HEALTH CARE EDUCATION/TRAINING PROGRAM

## 2018-02-14 NOTE — PROGRESS NOTES
Patient instructed to remove shoes and socks, instructed to sit in exam chair. Current PCP name is [facility] and date of last visit 01/2018. Do you have a follow up visit scheduled? Yes 04/2018    Diabetic visit information    Blood pressure (Control is BP <140/90)  BP Readings from Last 3 Encounters:   02/14/18 123/67   01/31/18 125/68   01/23/18 (!) 114/55       BP taken with correct size cuff? - Yes   Repeated if > 140/90 Yes      Tobacco use:  Patient  reports that he has never smoked. He has never used smokeless tobacco.  If Smoker - Cessation materials given? - Yes       Diabetic Health Maintenance Items due  Diabetes Management   Topic Date Due    Diabetic retinal exam  07/16/1958    Diabetic microalbuminuria test  12/15/2016       Diabetic retinal exam done in last year? - No   If No: remind patient that it is due and they should schedule an exam    Medications  Is patient taking any medications for diabetes? -   Yes  Have blood sugars been controlled? Fasting blood sugars under 120   -   Yes   Random home sugars or today's POCT glucose is under 180 -   Yes   []  If No to the above then patient should schedule appt with PCP. Diabetic Plan    A1C Plan  Lab Results   Component Value Date    LABA1C 8.2 (H) 12/13/2017    LABA1C 8.0 (H) 08/23/2012      []  If A1C over 8 and last result >3 months ago - Order A1C and refer to PCP   []  If last A1C over 6 months ago - Order A1C and refer to PCP for follow up   []  If elevated blood sugars > 180 - refer to PCP for follow up    []  Blood sugar controlled - A1C under 8 and last check was < 6 months      Cholesterol Plan   Lab Results   Component Value Date    LDLCHOLESTEROL 22 12/14/2017      []  If LDL > 100 and last result >3 months ago - order Fasting lipids and refer to PCP for follow up   []  If LDL < 100 and over 1 year ago - Order Fasting lipids and refer to PCP for follow up   [] LDL is controlled.   LDL < 100 and checked within the last year     Blood Pressure  BP Readings from Last 3 Encounters:   02/14/18 123/67   01/31/18 125/68   01/23/18 (!) 114/55      []  If SBP >140 mmhg - refer to PCP for follow up   []  If DBP > 90 mmhg - refer to PCP for follow up   [] BP is controlled <140/90     Order labs as PCP ordered.   (ie: Lipids, A1C, CMP)

## 2018-02-21 ENCOUNTER — OFFICE VISIT (OUTPATIENT)
Dept: PODIATRY | Age: 68
End: 2018-02-21
Payer: COMMERCIAL

## 2018-02-21 VITALS
HEART RATE: 75 BPM | SYSTOLIC BLOOD PRESSURE: 136 MMHG | WEIGHT: 201 LBS | DIASTOLIC BLOOD PRESSURE: 73 MMHG | HEIGHT: 71 IN | BODY MASS INDEX: 28.14 KG/M2 | TEMPERATURE: 96.6 F

## 2018-02-21 DIAGNOSIS — Z98.890 S/P FOOT SURGERY, RIGHT: ICD-10-CM

## 2018-02-21 DIAGNOSIS — E11.40 TYPE 2 DIABETES MELLITUS WITH DIABETIC NEUROPATHY, UNSPECIFIED LONG TERM INSULIN USE STATUS: Primary | ICD-10-CM

## 2018-02-21 DIAGNOSIS — L97.521 TOE ULCER, LEFT, LIMITED TO BREAKDOWN OF SKIN (HCC): ICD-10-CM

## 2018-02-21 DIAGNOSIS — I73.9 PAD (PERIPHERAL ARTERY DISEASE) (HCC): ICD-10-CM

## 2018-02-21 PROCEDURE — 99213 OFFICE O/P EST LOW 20 MIN: CPT | Performed by: PODIATRIST

## 2018-02-21 NOTE — PROGRESS NOTES
Patient instructed to remove shoes and socks, instructed to sit in exam chair. Current PCP name is Dr. Lorane Aase and date of last visit Feb 20 2018. Do you have a follow up visit scheduled? Yes or no    March 2018    Diabetic visit information    Blood pressure (Control is BP <140/90)  BP Readings from Last 3 Encounters:   02/21/18 (!) 140/75   02/14/18 123/67   01/31/18 125/68       BP taken with correct size cuff? - Yes   Repeated if > 140/90 Yes      Tobacco use:  Patient  reports that he has never smoked. He has never used smokeless tobacco.  If Smoker - Cessation materials given?- NA       Diabetic Health Maintenance Items due  Diabetes Management   Topic Date Due    Diabetic retinal exam  07/16/1958    Diabetic microalbuminuria test  12/15/2016       Diabetic retinal exam done in last year? - Yes   If No: remind patient that it is due and they should schedule an exam    Medications  Is patient taking any medications for diabetes? -   Yes  Have blood sugars been controlled? Fasting blood sugars under 120   -   Yes   Random home sugars or today's POCT glucose is under 180 -   Yes   []  If No to the above then patient should schedule appt with PCP. Diabetic Plan    A1C Plan  Lab Results   Component Value Date    LABA1C 8.2 (H) 12/13/2017    LABA1C 8.0 (H) 08/23/2012      []  If A1C over 8 and last result >3 months ago - Order A1C and refer to PCP   []  If last A1C over 6 months ago - Order A1C and refer to PCP for follow up   []  If elevated blood sugars > 180 - refer to PCP for follow up    []  Blood sugar controlled - A1C under 8 and last check was < 6 months      Cholesterol Plan   Lab Results   Component Value Date    LDLCHOLESTEROL 22 12/14/2017      []  If LDL > 100 and last result >3 months ago - order Fasting lipids and refer to PCP for follow up   []  If LDL < 100 and over 1 year ago - Order Fasting lipids and refer to PCP for follow up   [] LDL is controlled.   LDL < 100 and checked within the

## 2018-02-21 NOTE — PROGRESS NOTES
[facility] Podiatry Clinic Progress Note     Subjective:   Jonathan Little is a 69 y.o. male presenting for follow up of right foot 2nd/3rd digit amputations (DOS:12/16/17) and f/u left 2nd digit dry gangrene. Patient states [facility] has been changing his dressings daily with bacitracin and 2x2 gauze as patient is unable to reach his feet to do his own dressings. Patient denies pain, drainage, malodor, redness or swelling. Patient has been wearing his steel toed boots daily. He states that he has an appointment with vascular this week. Mentions he has a toothache. Objective:   Vascular: DP and PT pulses non palpable, Biphasic DP and PT, Right. Monophasic DP and PT, Left on doppler exam. Hair growth absent to the level of the digits, Bilateral.  CFT <5 seconds digits 1-5, Bilateral.     Neurological: Light touch sensation diminished to the digits, Bilateral.       Musculoskeletal: Muscle strength 5/5, Right. S/p Right 2nd and 3rd digit amputation. Hammer digit deformity of 2-5 bilaterally. No tenderness to RLE.        Integument:  Surgical site right foot healed. No rubor, edema, mal odor, or drainage, or increased calor. There is darkening of left 2nd digit. There is a superficial ulcer on the left 2nd digit tip with mild hyperkeratotic rim which appears epithelialized. No drainage, malodor, fluctuance, crepitus or induration. MEI/PVR b/l LE 12/11/17  MEI: Right: 0.97. Left: 0.97. TBI: R 0.49, L 0.4    Assessment:     1. Type 2 diabetes mellitus with diabetic neuropathy, unspecified long term insulin use status ([address]     2. PAD (peripheral artery disease) ([address]     3. S/P foot surgery, right     4. Toe ulcer, left, limited to breakdown of skin ([address]        Plan:   · Applied bacitracin and a bandaid.   · Home care to continue bacitracin/2x2 gauze lightly adhered with tape  · Continue sulcus offloading device - made with loose coban - ensured loose fit, not constriction of digits  · Instructed to wear new diabetic boots

## 2018-02-23 ENCOUNTER — INITIAL CONSULT (OUTPATIENT)
Dept: VASCULAR SURGERY | Age: 68
End: 2018-02-23

## 2018-02-23 VITALS
DIASTOLIC BLOOD PRESSURE: 76 MMHG | BODY MASS INDEX: 28.15 KG/M2 | WEIGHT: 201.06 LBS | OXYGEN SATURATION: 98 % | SYSTOLIC BLOOD PRESSURE: 132 MMHG | RESPIRATION RATE: 19 BRPM | HEART RATE: 74 BPM | HEIGHT: 71 IN

## 2018-02-23 DIAGNOSIS — E11.622 DIABETIC ULCER OF ANKLE ASSOCIATED WITH TYPE 2 DIABETES MELLITUS, LIMITED TO BREAKDOWN OF SKIN (HCC): ICD-10-CM

## 2018-02-23 DIAGNOSIS — L97.301 DIABETIC ULCER OF ANKLE ASSOCIATED WITH TYPE 2 DIABETES MELLITUS, LIMITED TO BREAKDOWN OF SKIN (HCC): ICD-10-CM

## 2018-02-23 PROCEDURE — 99024 POSTOP FOLLOW-UP VISIT: CPT | Performed by: SURGERY

## 2018-03-07 ENCOUNTER — OFFICE VISIT (OUTPATIENT)
Dept: PODIATRY | Age: 68
End: 2018-03-07
Payer: COMMERCIAL

## 2018-03-07 VITALS
DIASTOLIC BLOOD PRESSURE: 70 MMHG | HEART RATE: 75 BPM | SYSTOLIC BLOOD PRESSURE: 127 MMHG | HEIGHT: 71 IN | BODY MASS INDEX: 27.75 KG/M2 | WEIGHT: 198.2 LBS

## 2018-03-07 DIAGNOSIS — L97.521 SKIN ULCER OF LEFT FOOT, LIMITED TO BREAKDOWN OF SKIN (HCC): Primary | ICD-10-CM

## 2018-03-07 DIAGNOSIS — S98.131A AMPUTATED TOE OF RIGHT FOOT (HCC): ICD-10-CM

## 2018-03-07 DIAGNOSIS — I73.9 PAD (PERIPHERAL ARTERY DISEASE) (HCC): ICD-10-CM

## 2018-03-07 PROCEDURE — 11042 DBRDMT SUBQ TIS 1ST 20SQCM/<: CPT | Performed by: PODIATRIST

## 2018-03-07 NOTE — PROGRESS NOTES
[facility] Podiatry Clinic Progress Note     Subjective:   Michelle Moreno is a 69 y.o. male presenting for follow up of right foot 2nd/3rd digit amputations (DOS:12/16/17) and f/u left 2nd digit dry gangrene. Patient states that he has a Home health care nurse coming frequently to change the dressing on his toe. Patient is unable to reach the bottom of his foot. Patient states that he is off of work until April. She denies any nausea/ vomiting/fevers/chills. Objective:   Vascular: DP and PT pulses non palpable, Biphasic DP and PT, Right. Monophasic DP and PT, Left on doppler exam. Hair growth absent to the level of the digits, Bilateral.  CFT <5 seconds digits 1-5, Bilateral.     Neurological: Light touch sensation diminished to the digits, Bilateral.       Musculoskeletal: Muscle strength 5/5, Right. S/p Right 2nd and 3rd digit amputation. Hammer digit deformity of 2-5 bilaterally. No tenderness to RLE.        Integument:  Surgical site right foot healed. No rubor, edema, mal odor, or drainage, or increased calor. There is darkening of left 2nd digit. There is a superficial ulcer on the left 2nd digit tip with mild hyperkeratotic rim which appears epithelialized. Wound measures approximately 1 cm x 1 cm x 0.2 cm. No drainage, malodor, fluctuance, crepitus or induration. MEI/PVR b/l LE 12/11/17  MEI: Right: 0.97. Left: 0.97. TBI: R 0.49, L 0.4    Assessment:   Pt is a 69 y.o. male with     1. Skin ulcer of left foot, limited to breakdown of skin ([facility])  [place] DEBRIDEMENT, SKIN, SUB-Q TISSUE,=<20 SQ CM   2. PAD (peripheral artery disease) ([address]     3. Amputated toe of right foot ([address]           Plan:   · Debrided the wound down to the layer of the dermis, with a #15 blade, hemostasis achieved with direct pressure. · Applied fibracol and a dry sterile dressing  · Home care to continue bacitracin and Band-Aid daily .    · Continue sulcus offloading device - made with loose coban - ensured loose fit, not constriction of digits  · Patient to  her diabetic shoes  · Pt is to continue remaining off of work  · RTC 2 weeks for follow up.      Electronically signed by Jm Kuhn DPM on 3/7/2018 at 3:56 PM

## 2018-03-21 ENCOUNTER — OFFICE VISIT (OUTPATIENT)
Dept: PODIATRY | Age: 68
End: 2018-03-21
Payer: COMMERCIAL

## 2018-03-21 VITALS
TEMPERATURE: 97.6 F | SYSTOLIC BLOOD PRESSURE: 153 MMHG | HEART RATE: 79 BPM | WEIGHT: 200 LBS | DIASTOLIC BLOOD PRESSURE: 85 MMHG | HEIGHT: 71 IN | BODY MASS INDEX: 28 KG/M2

## 2018-03-21 DIAGNOSIS — E11.40 TYPE 2 DIABETES MELLITUS WITH DIABETIC NEUROPATHY, UNSPECIFIED LONG TERM INSULIN USE STATUS: ICD-10-CM

## 2018-03-21 DIAGNOSIS — L97.522 SKIN ULCER OF LEFT FOOT WITH FAT LAYER EXPOSED (HCC): Primary | ICD-10-CM

## 2018-03-21 DIAGNOSIS — S98.131A AMPUTATED TOE OF RIGHT FOOT (HCC): ICD-10-CM

## 2018-03-21 DIAGNOSIS — I73.9 PAD (PERIPHERAL ARTERY DISEASE) (HCC): ICD-10-CM

## 2018-03-21 PROCEDURE — 11042 DBRDMT SUBQ TIS 1ST 20SQCM/<: CPT | Performed by: PODIATRIST

## 2018-03-21 NOTE — PROGRESS NOTES
[facility] Podiatry Clinic Progress Note     Subjective:   Jude Diego is a 69 y.o. male presenting for follow up left 2nd toe ulceration. Patient states that he has a Home health care nurse coming frequently to change the dressing on his toe with a bandaid. He relates that is walking a lot with his diabetic shoes. Patient states that he is off of work until April. He denies any nausea/vomiting/fevers/chills. Denies pain. PCP is Bro Maria MD       Objective:     Vitals:    03/21/18 1343   BP: (!) 153/85   Pulse: 79   Temp:         Lab Results   Component Value Date    LABA1C 8.2 (H) 12/13/2017       Vascular: DP and PT pulses non palpable, Biphasic DP and PT, Right. Monophasic DP and PT, Left on doppler exam. Hair growth absent to the level of the digits, Bilateral.  CFT <5 seconds digits 1-5, Bilateral.     Neurological: Light touch sensation diminished to the digits, Bilateral.       Musculoskeletal: Muscle strength 5/5, Right. S/p Right 2nd and 3rd digit amputation. Hammer digit deformity of 2-5 bilaterally. No tenderness to RLE.        Integument:  Surgical site right foot healed. No rubor, edema, mal odor, or drainage, or increased calor. There is a full thickness ulcer on the left 2nd digit tip with mild hyperkeratotic rim. Wound measures approximately 0.5 cm x 0.5 cm x 0.2 cm. No drainage, malodor, fluctuance, crepitus or induration. Toe is warm with CFT brisk. MEI/PVR b/l LE 12/11/17  MEI: Right: 0.97, Left: 0.97  TBI: R 0.49, L 0.4    Assessment:   Pt is a 69 y.o. male with     1. Skin ulcer of left foot with fat layer exposed ([address]  ND DEBRIDEMENT, SKIN, SUB-Q TISSUE,=<20 SQ CM   2. PAD (peripheral artery disease) ([facility])  ND DEBRIDEMENT, SKIN, SUB-Q TISSUE,=<20 SQ CM   3. Type 2 diabetes mellitus with diabetic neuropathy, unspecified long term insulin use status ([facility])  ND DEBRIDEMENT, SKIN, SUB-Q TISSUE,=<20 SQ CM   4.  Amputated toe of right foot ([address]           Plan:   · Debrided the wound down to the layer of the dermis, with a #15 blade, hemostasis achieved with direct pressure. · Applied bandaid and dispensed offloading crest pad  · Home care to continue bacitracin and Band-Aid daily . · Continue offloading device - educated that the cause of his ulcer is pressure from walking and advised to minimize WB  · F/u with [facility] for diabetic shoes   · Pt is to continue remaining off of work  · Educated on the importance of daily foot exams and what to look for, including open sores, foreign bodies, and signs of infection. · Educated on signs and symptoms of infection. Instructed to call clinic immediately or go to ER if signs and symptoms of infection are present.    · RTC 2 weeks for follow up     Electronically signed by Corina Metcalf DPM on 3/21/2018 at 2:44 PM

## 2018-04-11 ENCOUNTER — OFFICE VISIT (OUTPATIENT)
Dept: PODIATRY | Age: 68
End: 2018-04-11
Payer: COMMERCIAL

## 2018-04-11 VITALS
DIASTOLIC BLOOD PRESSURE: 68 MMHG | WEIGHT: 203 LBS | SYSTOLIC BLOOD PRESSURE: 133 MMHG | BODY MASS INDEX: 28.42 KG/M2 | HEART RATE: 70 BPM | HEIGHT: 71 IN

## 2018-04-11 DIAGNOSIS — I73.9 PAD (PERIPHERAL ARTERY DISEASE) (HCC): ICD-10-CM

## 2018-04-11 DIAGNOSIS — E11.40 TYPE 2 DIABETES MELLITUS WITH DIABETIC NEUROPATHY, UNSPECIFIED LONG TERM INSULIN USE STATUS: ICD-10-CM

## 2018-04-11 DIAGNOSIS — L97.522 SKIN ULCER OF LEFT FOOT WITH FAT LAYER EXPOSED (HCC): Primary | ICD-10-CM

## 2018-04-11 PROCEDURE — 11042 DBRDMT SUBQ TIS 1ST 20SQCM/<: CPT | Performed by: STUDENT IN AN ORGANIZED HEALTH CARE EDUCATION/TRAINING PROGRAM

## 2018-04-24 ENCOUNTER — OFFICE VISIT (OUTPATIENT)
Dept: INFECTIOUS DISEASES | Age: 68
End: 2018-04-24
Payer: MEDICAID

## 2018-04-24 VITALS
RESPIRATION RATE: 14 BRPM | HEIGHT: 71 IN | TEMPERATURE: 96.8 F | SYSTOLIC BLOOD PRESSURE: 126 MMHG | DIASTOLIC BLOOD PRESSURE: 77 MMHG | BODY MASS INDEX: 28.14 KG/M2 | HEART RATE: 81 BPM | WEIGHT: 201 LBS

## 2018-04-24 DIAGNOSIS — E08.621 DIABETIC ULCER OF TOE ASSOCIATED WITH DIABETES MELLITUS DUE TO UNDERLYING CONDITION, WITH FAT LAYER EXPOSED, UNSPECIFIED LATERALITY (HCC): Primary | ICD-10-CM

## 2018-04-24 DIAGNOSIS — I10 ESSENTIAL HYPERTENSION: ICD-10-CM

## 2018-04-24 DIAGNOSIS — L97.502 DIABETIC ULCER OF TOE ASSOCIATED WITH DIABETES MELLITUS DUE TO UNDERLYING CONDITION, WITH FAT LAYER EXPOSED, UNSPECIFIED LATERALITY (HCC): Primary | ICD-10-CM

## 2018-04-24 DIAGNOSIS — E11.51 DM (DIABETES MELLITUS) TYPE II CONTROLLED PERIPHERAL VASCULAR DISORDER: ICD-10-CM

## 2018-04-24 DIAGNOSIS — I96 GANGRENE OF TOE OF RIGHT FOOT (HCC): ICD-10-CM

## 2018-04-24 DIAGNOSIS — I96 GANGRENE OF TOE OF LEFT FOOT (HCC): ICD-10-CM

## 2018-04-24 DIAGNOSIS — S98.131A AMPUTATED TOE OF RIGHT FOOT (HCC): ICD-10-CM

## 2018-04-24 DIAGNOSIS — A49.02 MRSA (METHICILLIN RESISTANT STAPHYLOCOCCUS AUREUS) INFECTION: ICD-10-CM

## 2018-04-24 PROCEDURE — 99214 OFFICE O/P EST MOD 30 MIN: CPT | Performed by: INTERNAL MEDICINE

## 2018-04-25 ENCOUNTER — OFFICE VISIT (OUTPATIENT)
Dept: PODIATRY | Age: 68
End: 2018-04-25
Payer: COMMERCIAL

## 2018-04-25 VITALS
HEART RATE: 77 BPM | DIASTOLIC BLOOD PRESSURE: 74 MMHG | WEIGHT: 203.8 LBS | BODY MASS INDEX: 28.53 KG/M2 | HEIGHT: 71 IN | SYSTOLIC BLOOD PRESSURE: 133 MMHG

## 2018-04-25 DIAGNOSIS — L97.521 TOE ULCER, LEFT, LIMITED TO BREAKDOWN OF SKIN (HCC): ICD-10-CM

## 2018-04-25 DIAGNOSIS — E11.40 TYPE 2 DIABETES MELLITUS WITH DIABETIC NEUROPATHY, UNSPECIFIED LONG TERM INSULIN USE STATUS: ICD-10-CM

## 2018-04-25 DIAGNOSIS — I73.9 PAD (PERIPHERAL ARTERY DISEASE) (HCC): ICD-10-CM

## 2018-04-25 DIAGNOSIS — L97.522 SKIN ULCER OF LEFT FOOT WITH FAT LAYER EXPOSED (HCC): Primary | ICD-10-CM

## 2018-04-25 PROCEDURE — 99213 OFFICE O/P EST LOW 20 MIN: CPT | Performed by: STUDENT IN AN ORGANIZED HEALTH CARE EDUCATION/TRAINING PROGRAM

## 2018-04-25 PROCEDURE — 99213 OFFICE O/P EST LOW 20 MIN: CPT

## 2018-04-25 RX ORDER — AMMONIUM LACTATE 12 G/100G
LOTION TOPICAL
Qty: 1 BOTTLE | Refills: 2 | Status: SHIPPED | OUTPATIENT
Start: 2018-04-25 | End: 2018-09-24 | Stop reason: SDUPTHER

## 2018-05-09 ENCOUNTER — HOSPITAL ENCOUNTER (OUTPATIENT)
Dept: GENERAL RADIOLOGY | Age: 68
Discharge: HOME | End: 2018-05-11
Payer: MEDICARE

## 2018-05-09 ENCOUNTER — HOSPITAL ENCOUNTER (OUTPATIENT)
Age: 68
Discharge: HOME | End: 2018-05-11
Payer: MEDICARE

## 2018-05-09 ENCOUNTER — OFFICE VISIT (OUTPATIENT)
Dept: PODIATRY | Age: 68
End: 2018-05-09
Payer: MEDICARE

## 2018-05-09 VITALS
BODY MASS INDEX: 28.7 KG/M2 | DIASTOLIC BLOOD PRESSURE: 62 MMHG | WEIGHT: 205 LBS | SYSTOLIC BLOOD PRESSURE: 111 MMHG | HEIGHT: 71 IN | HEART RATE: 72 BPM

## 2018-05-09 DIAGNOSIS — S98.131A AMPUTATED TOE OF RIGHT FOOT (HCC): ICD-10-CM

## 2018-05-09 DIAGNOSIS — I73.9 PAD (PERIPHERAL ARTERY DISEASE) (HCC): ICD-10-CM

## 2018-05-09 DIAGNOSIS — L97.522 SKIN ULCER OF LEFT FOOT WITH FAT LAYER EXPOSED (HCC): Primary | ICD-10-CM

## 2018-05-09 DIAGNOSIS — E11.40 TYPE 2 DIABETES MELLITUS WITH DIABETIC NEUROPATHY, UNSPECIFIED LONG TERM INSULIN USE STATUS: ICD-10-CM

## 2018-05-09 DIAGNOSIS — L97.522 SKIN ULCER OF LEFT FOOT WITH FAT LAYER EXPOSED (HCC): ICD-10-CM

## 2018-05-09 PROCEDURE — 11042 DBRDMT SUBQ TIS 1ST 20SQCM/<: CPT | Performed by: PODIATRIST

## 2018-05-09 PROCEDURE — 73630 X-RAY EXAM OF FOOT: CPT

## 2018-05-09 PROCEDURE — 99212 OFFICE O/P EST SF 10 MIN: CPT | Performed by: PODIATRIST

## 2018-05-16 ENCOUNTER — OFFICE VISIT (OUTPATIENT)
Dept: PODIATRY | Age: 68
End: 2018-05-16
Payer: MEDICARE

## 2018-05-16 ENCOUNTER — HOSPITAL ENCOUNTER (OUTPATIENT)
Age: 68
Setting detail: SPECIMEN
Discharge: HOME | End: 2018-05-16
Payer: COMMERCIAL

## 2018-05-16 VITALS
HEIGHT: 71 IN | HEART RATE: 68 BPM | DIASTOLIC BLOOD PRESSURE: 66 MMHG | SYSTOLIC BLOOD PRESSURE: 130 MMHG | BODY MASS INDEX: 28.14 KG/M2 | WEIGHT: 201 LBS

## 2018-05-16 DIAGNOSIS — L97.522 DIABETIC ULCER OF TOE OF LEFT FOOT ASSOCIATED WITH TYPE 2 DIABETES MELLITUS, WITH FAT LAYER EXPOSED (HCC): Primary | ICD-10-CM

## 2018-05-16 DIAGNOSIS — E11.621 DIABETIC ULCER OF TOE OF LEFT FOOT ASSOCIATED WITH TYPE 2 DIABETES MELLITUS, WITH FAT LAYER EXPOSED (HCC): Primary | ICD-10-CM

## 2018-05-16 DIAGNOSIS — B35.1 ONYCHOMYCOSIS: ICD-10-CM

## 2018-05-16 PROCEDURE — 99213 OFFICE O/P EST LOW 20 MIN: CPT | Performed by: PODIATRIST

## 2018-05-16 PROCEDURE — 87070 CULTURE OTHR SPECIMN AEROBIC: CPT | Performed by: PODIATRIST

## 2018-05-16 RX ORDER — CEPHALEXIN 500 MG/1
500 CAPSULE ORAL 3 TIMES DAILY
Qty: 21 CAPSULE | Refills: 0 | Status: SHIPPED | OUTPATIENT
Start: 2018-05-16 | End: 2018-05-23 | Stop reason: SDUPTHER

## 2018-05-18 LAB
CULTURE: ABNORMAL
CULTURE: ABNORMAL
DIRECT EXAM: ABNORMAL
DIRECT EXAM: ABNORMAL
Lab: ABNORMAL
SPECIMEN DESCRIPTION: ABNORMAL
STATUS: ABNORMAL

## 2018-05-23 ENCOUNTER — OFFICE VISIT (OUTPATIENT)
Dept: PODIATRY | Age: 68
End: 2018-05-23
Payer: MEDICARE

## 2018-05-23 VITALS
BODY MASS INDEX: 28.64 KG/M2 | HEIGHT: 71 IN | SYSTOLIC BLOOD PRESSURE: 136 MMHG | WEIGHT: 204.6 LBS | DIASTOLIC BLOOD PRESSURE: 73 MMHG | HEART RATE: 80 BPM

## 2018-05-23 DIAGNOSIS — I73.9 PAD (PERIPHERAL ARTERY DISEASE) (HCC): ICD-10-CM

## 2018-05-23 DIAGNOSIS — L97.521 TOE ULCER, LEFT, LIMITED TO BREAKDOWN OF SKIN (HCC): ICD-10-CM

## 2018-05-23 DIAGNOSIS — L97.522 DIABETIC ULCER OF TOE OF LEFT FOOT ASSOCIATED WITH TYPE 2 DIABETES MELLITUS, WITH FAT LAYER EXPOSED (HCC): Primary | ICD-10-CM

## 2018-05-23 DIAGNOSIS — L97.522 SKIN ULCER OF LEFT FOOT WITH FAT LAYER EXPOSED (HCC): ICD-10-CM

## 2018-05-23 DIAGNOSIS — E11.621 DIABETIC ULCER OF TOE OF LEFT FOOT ASSOCIATED WITH TYPE 2 DIABETES MELLITUS, WITH FAT LAYER EXPOSED (HCC): Primary | ICD-10-CM

## 2018-05-23 PROCEDURE — 99213 OFFICE O/P EST LOW 20 MIN: CPT

## 2018-05-23 PROCEDURE — 3017F COLORECTAL CA SCREEN DOC REV: CPT | Performed by: STUDENT IN AN ORGANIZED HEALTH CARE EDUCATION/TRAINING PROGRAM

## 2018-05-23 PROCEDURE — 99213 OFFICE O/P EST LOW 20 MIN: CPT | Performed by: STUDENT IN AN ORGANIZED HEALTH CARE EDUCATION/TRAINING PROGRAM

## 2018-05-23 PROCEDURE — G8427 DOCREV CUR MEDS BY ELIG CLIN: HCPCS | Performed by: STUDENT IN AN ORGANIZED HEALTH CARE EDUCATION/TRAINING PROGRAM

## 2018-05-23 PROCEDURE — 1123F ACP DISCUSS/DSCN MKR DOCD: CPT | Performed by: STUDENT IN AN ORGANIZED HEALTH CARE EDUCATION/TRAINING PROGRAM

## 2018-05-23 PROCEDURE — G8417 CALC BMI ABV UP PARAM F/U: HCPCS | Performed by: STUDENT IN AN ORGANIZED HEALTH CARE EDUCATION/TRAINING PROGRAM

## 2018-05-23 PROCEDURE — 3046F HEMOGLOBIN A1C LEVEL >9.0%: CPT | Performed by: STUDENT IN AN ORGANIZED HEALTH CARE EDUCATION/TRAINING PROGRAM

## 2018-05-23 PROCEDURE — 4040F PNEUMOC VAC/ADMIN/RCVD: CPT | Performed by: STUDENT IN AN ORGANIZED HEALTH CARE EDUCATION/TRAINING PROGRAM

## 2018-05-23 PROCEDURE — 1036F TOBACCO NON-USER: CPT | Performed by: STUDENT IN AN ORGANIZED HEALTH CARE EDUCATION/TRAINING PROGRAM

## 2018-05-23 PROCEDURE — 2022F DILAT RTA XM EVC RTNOPTHY: CPT | Performed by: STUDENT IN AN ORGANIZED HEALTH CARE EDUCATION/TRAINING PROGRAM

## 2018-05-23 RX ORDER — CEPHALEXIN 500 MG/1
500 CAPSULE ORAL 3 TIMES DAILY
Qty: 21 CAPSULE | Refills: 0 | Status: SHIPPED | OUTPATIENT
Start: 2018-05-23 | End: 2018-05-30

## 2018-05-23 RX ORDER — DIAPER,BRIEF,INFANT-TODD,DISP
EACH MISCELLANEOUS ONCE
Status: COMPLETED | OUTPATIENT
Start: 2018-05-23 | End: 2018-05-23

## 2018-05-23 RX ADMIN — Medication: at 15:42

## 2018-05-29 ENCOUNTER — HOSPITAL ENCOUNTER (OUTPATIENT)
Dept: NUCLEAR MEDICINE | Age: 68
Discharge: HOME | End: 2018-05-31
Payer: MEDICARE

## 2018-05-29 DIAGNOSIS — L97.521 TOE ULCER, LEFT, LIMITED TO BREAKDOWN OF SKIN (HCC): ICD-10-CM

## 2018-05-29 DIAGNOSIS — I73.9 PAD (PERIPHERAL ARTERY DISEASE) (HCC): ICD-10-CM

## 2018-05-29 DIAGNOSIS — L97.522 DIABETIC ULCER OF TOE OF LEFT FOOT ASSOCIATED WITH TYPE 2 DIABETES MELLITUS, WITH FAT LAYER EXPOSED (HCC): ICD-10-CM

## 2018-05-29 DIAGNOSIS — E11.621 DIABETIC ULCER OF TOE OF LEFT FOOT ASSOCIATED WITH TYPE 2 DIABETES MELLITUS, WITH FAT LAYER EXPOSED (HCC): ICD-10-CM

## 2018-05-29 PROCEDURE — 3430000000 HC RX DIAGNOSTIC RADIOPHARMACEUTICAL: Performed by: STUDENT IN AN ORGANIZED HEALTH CARE EDUCATION/TRAINING PROGRAM

## 2018-05-29 PROCEDURE — 78315 BONE IMAGING 3 PHASE: CPT

## 2018-05-29 PROCEDURE — A9503 TC99M MEDRONATE: HCPCS | Performed by: STUDENT IN AN ORGANIZED HEALTH CARE EDUCATION/TRAINING PROGRAM

## 2018-05-29 RX ORDER — TC 99M MEDRONATE 20 MG/10ML
25 INJECTION, POWDER, LYOPHILIZED, FOR SOLUTION INTRAVENOUS
Status: COMPLETED | OUTPATIENT
Start: 2018-05-29 | End: 2018-05-29

## 2018-05-29 RX ADMIN — Medication 25 MILLICURIE: at 08:00

## 2018-05-30 ENCOUNTER — OFFICE VISIT (OUTPATIENT)
Dept: PODIATRY | Age: 68
End: 2018-05-30
Payer: MEDICARE

## 2018-05-30 VITALS
SYSTOLIC BLOOD PRESSURE: 138 MMHG | HEART RATE: 88 BPM | BODY MASS INDEX: 28.31 KG/M2 | WEIGHT: 202.2 LBS | DIASTOLIC BLOOD PRESSURE: 70 MMHG | HEIGHT: 71 IN

## 2018-05-30 DIAGNOSIS — M86.672 CHRONIC OSTEOMYELITIS OF TOE OF LEFT FOOT (HCC): ICD-10-CM

## 2018-05-30 DIAGNOSIS — I73.9 PAD (PERIPHERAL ARTERY DISEASE) (HCC): ICD-10-CM

## 2018-05-30 DIAGNOSIS — L97.522 DIABETIC ULCER OF TOE OF LEFT FOOT ASSOCIATED WITH TYPE 2 DIABETES MELLITUS, WITH FAT LAYER EXPOSED (HCC): Primary | ICD-10-CM

## 2018-05-30 DIAGNOSIS — E11.621 DIABETIC ULCER OF TOE OF LEFT FOOT ASSOCIATED WITH TYPE 2 DIABETES MELLITUS, WITH FAT LAYER EXPOSED (HCC): Primary | ICD-10-CM

## 2018-05-30 PROCEDURE — 11042 DBRDMT SUBQ TIS 1ST 20SQCM/<: CPT | Performed by: PODIATRIST

## 2018-05-30 PROCEDURE — 99214 OFFICE O/P EST MOD 30 MIN: CPT | Performed by: PODIATRIST

## 2018-05-30 ASSESSMENT — ENCOUNTER SYMPTOMS
VOMITING: 0
NAUSEA: 0
BLURRED VISION: 0
COUGH: 0
SORE THROAT: 0
SHORTNESS OF BREATH: 0
PHOTOPHOBIA: 0

## 2018-05-31 ENCOUNTER — TELEPHONE (OUTPATIENT)
Dept: PODIATRY | Age: 68
End: 2018-05-31

## 2018-06-13 ENCOUNTER — ANESTHESIA EVENT (OUTPATIENT)
Dept: OPERATING ROOM | Age: 68
End: 2018-06-13
Payer: COMMERCIAL

## 2018-06-14 ENCOUNTER — ANESTHESIA (OUTPATIENT)
Dept: OPERATING ROOM | Age: 68
End: 2018-06-14
Payer: COMMERCIAL

## 2018-06-14 ENCOUNTER — HOSPITAL ENCOUNTER (OUTPATIENT)
Age: 68
Setting detail: OUTPATIENT SURGERY
Discharge: HOME | End: 2018-06-14
Attending: PODIATRIST | Admitting: PODIATRIST
Payer: COMMERCIAL

## 2018-06-14 VITALS
WEIGHT: 201.4 LBS | SYSTOLIC BLOOD PRESSURE: 133 MMHG | OXYGEN SATURATION: 98 % | TEMPERATURE: 97.7 F | DIASTOLIC BLOOD PRESSURE: 66 MMHG | HEIGHT: 71 IN | BODY MASS INDEX: 28.19 KG/M2 | HEART RATE: 59 BPM | RESPIRATION RATE: 11 BRPM

## 2018-06-14 VITALS
TEMPERATURE: 94.6 F | OXYGEN SATURATION: 100 % | RESPIRATION RATE: 13 BRPM | SYSTOLIC BLOOD PRESSURE: 131 MMHG | DIASTOLIC BLOOD PRESSURE: 67 MMHG

## 2018-06-14 DIAGNOSIS — G89.18 ACUTE POST-OPERATIVE PAIN: Primary | ICD-10-CM

## 2018-06-14 LAB
GLUCOSE BLD-MCNC: 103 MG/DL (ref 75–110)
GLUCOSE BLD-MCNC: 76 MG/DL (ref 75–110)

## 2018-06-14 PROCEDURE — 2580000003 HC RX 258: Performed by: ANESTHESIOLOGY

## 2018-06-14 PROCEDURE — 82947 ASSAY GLUCOSE BLOOD QUANT: CPT

## 2018-06-14 PROCEDURE — 86403 PARTICLE AGGLUT ANTBDY SCRN: CPT

## 2018-06-14 PROCEDURE — 2500000003 HC RX 250 WO HCPCS: Performed by: PODIATRIST

## 2018-06-14 PROCEDURE — 87186 SC STD MICRODIL/AGAR DIL: CPT

## 2018-06-14 PROCEDURE — 3700000001 HC ADD 15 MINUTES (ANESTHESIA): Performed by: PODIATRIST

## 2018-06-14 PROCEDURE — 87205 SMEAR GRAM STAIN: CPT

## 2018-06-14 PROCEDURE — 3600000012 HC SURGERY LEVEL 2 ADDTL 15MIN: Performed by: PODIATRIST

## 2018-06-14 PROCEDURE — 87176 TISSUE HOMOGENIZATION CULTR: CPT

## 2018-06-14 PROCEDURE — 3700000000 HC ANESTHESIA ATTENDED CARE: Performed by: PODIATRIST

## 2018-06-14 PROCEDURE — 2720000010 HC SURG SUPPLY STERILE: Performed by: PODIATRIST

## 2018-06-14 PROCEDURE — 3600000002 HC SURGERY LEVEL 2 BASE: Performed by: PODIATRIST

## 2018-06-14 PROCEDURE — 7100000001 HC PACU RECOVERY - ADDTL 15 MIN: Performed by: PODIATRIST

## 2018-06-14 PROCEDURE — 7100000000 HC PACU RECOVERY - FIRST 15 MIN: Performed by: PODIATRIST

## 2018-06-14 PROCEDURE — 87070 CULTURE OTHR SPECIMN AEROBIC: CPT

## 2018-06-14 PROCEDURE — 6360000002 HC RX W HCPCS: Performed by: PODIATRIST

## 2018-06-14 PROCEDURE — 6360000002 HC RX W HCPCS: Performed by: NURSE ANESTHETIST, CERTIFIED REGISTERED

## 2018-06-14 PROCEDURE — 87075 CULTR BACTERIA EXCEPT BLOOD: CPT

## 2018-06-14 RX ORDER — ONDANSETRON 2 MG/ML
4 INJECTION INTRAMUSCULAR; INTRAVENOUS
Status: DISCONTINUED | OUTPATIENT
Start: 2018-06-14 | End: 2018-06-14 | Stop reason: HOSPADM

## 2018-06-14 RX ORDER — PROPOFOL 10 MG/ML
INJECTION, EMULSION INTRAVENOUS CONTINUOUS PRN
Status: DISCONTINUED | OUTPATIENT
Start: 2018-06-14 | End: 2018-06-14 | Stop reason: SDUPTHER

## 2018-06-14 RX ORDER — FENTANYL CITRATE 50 UG/ML
25 INJECTION, SOLUTION INTRAMUSCULAR; INTRAVENOUS EVERY 5 MIN PRN
Status: DISCONTINUED | OUTPATIENT
Start: 2018-06-14 | End: 2018-06-14 | Stop reason: HOSPADM

## 2018-06-14 RX ORDER — SODIUM CHLORIDE 0.9 % (FLUSH) 0.9 %
10 SYRINGE (ML) INJECTION EVERY 12 HOURS SCHEDULED
Status: DISCONTINUED | OUTPATIENT
Start: 2018-06-14 | End: 2018-06-14 | Stop reason: HOSPADM

## 2018-06-14 RX ORDER — OXYCODONE HYDROCHLORIDE AND ACETAMINOPHEN 5; 325 MG/1; MG/1
1 TABLET ORAL
Status: DISCONTINUED | OUTPATIENT
Start: 2018-06-14 | End: 2018-06-14 | Stop reason: HOSPADM

## 2018-06-14 RX ORDER — HYDROCODONE BITARTRATE AND ACETAMINOPHEN 5; 325 MG/1; MG/1
1 TABLET ORAL EVERY 6 HOURS PRN
Qty: 20 TABLET | Refills: 0 | Status: SHIPPED | OUTPATIENT
Start: 2018-06-14 | End: 2018-06-19

## 2018-06-14 RX ORDER — FENTANYL CITRATE 50 UG/ML
50 INJECTION, SOLUTION INTRAMUSCULAR; INTRAVENOUS EVERY 5 MIN PRN
Status: DISCONTINUED | OUTPATIENT
Start: 2018-06-14 | End: 2018-06-14 | Stop reason: HOSPADM

## 2018-06-14 RX ORDER — PROPOFOL 10 MG/ML
INJECTION, EMULSION INTRAVENOUS PRN
Status: DISCONTINUED | OUTPATIENT
Start: 2018-06-14 | End: 2018-06-14 | Stop reason: SDUPTHER

## 2018-06-14 RX ORDER — MIDAZOLAM HYDROCHLORIDE 1 MG/ML
INJECTION INTRAMUSCULAR; INTRAVENOUS PRN
Status: DISCONTINUED | OUTPATIENT
Start: 2018-06-14 | End: 2018-06-14 | Stop reason: SDUPTHER

## 2018-06-14 RX ORDER — ONDANSETRON 2 MG/ML
INJECTION INTRAMUSCULAR; INTRAVENOUS PRN
Status: DISCONTINUED | OUTPATIENT
Start: 2018-06-14 | End: 2018-06-14 | Stop reason: SDUPTHER

## 2018-06-14 RX ORDER — SODIUM CHLORIDE 0.9 % (FLUSH) 0.9 %
10 SYRINGE (ML) INJECTION PRN
Status: DISCONTINUED | OUTPATIENT
Start: 2018-06-14 | End: 2018-06-14 | Stop reason: HOSPADM

## 2018-06-14 RX ORDER — SODIUM CHLORIDE 9 MG/ML
INJECTION, SOLUTION INTRAVENOUS CONTINUOUS
Status: DISCONTINUED | OUTPATIENT
Start: 2018-06-14 | End: 2018-06-14 | Stop reason: HOSPADM

## 2018-06-14 RX ORDER — SODIUM CHLORIDE, SODIUM LACTATE, POTASSIUM CHLORIDE, CALCIUM CHLORIDE 600; 310; 30; 20 MG/100ML; MG/100ML; MG/100ML; MG/100ML
INJECTION, SOLUTION INTRAVENOUS CONTINUOUS
Status: DISCONTINUED | OUTPATIENT
Start: 2018-06-14 | End: 2018-06-14 | Stop reason: HOSPADM

## 2018-06-14 RX ORDER — LIDOCAINE HYDROCHLORIDE 10 MG/ML
1 INJECTION, SOLUTION EPIDURAL; INFILTRATION; INTRACAUDAL; PERINEURAL
Status: DISCONTINUED | OUTPATIENT
Start: 2018-06-14 | End: 2018-06-14 | Stop reason: HOSPADM

## 2018-06-14 RX ORDER — LIDOCAINE HYDROCHLORIDE 10 MG/ML
INJECTION, SOLUTION EPIDURAL; INFILTRATION; INTRACAUDAL; PERINEURAL PRN
Status: DISCONTINUED | OUTPATIENT
Start: 2018-06-14 | End: 2018-06-14 | Stop reason: HOSPADM

## 2018-06-14 RX ADMIN — MIDAZOLAM HYDROCHLORIDE 1 MG: 1 INJECTION, SOLUTION INTRAMUSCULAR; INTRAVENOUS at 13:01

## 2018-06-14 RX ADMIN — PROPOFOL 25 MCG/KG/MIN: 10 INJECTION, EMULSION INTRAVENOUS at 13:06

## 2018-06-14 RX ADMIN — PROPOFOL 10 MG: 10 INJECTION, EMULSION INTRAVENOUS at 13:06

## 2018-06-14 RX ADMIN — ONDANSETRON 4 MG: 2 INJECTION, SOLUTION INTRAMUSCULAR; INTRAVENOUS at 13:22

## 2018-06-14 RX ADMIN — CEFAZOLIN SODIUM 2 G: 2 SOLUTION INTRAVENOUS at 13:12

## 2018-06-14 RX ADMIN — SODIUM CHLORIDE, POTASSIUM CHLORIDE, SODIUM LACTATE AND CALCIUM CHLORIDE: 600; 310; 30; 20 INJECTION, SOLUTION INTRAVENOUS at 13:00

## 2018-06-14 ASSESSMENT — PULMONARY FUNCTION TESTS
PIF_VALUE: 1
PIF_VALUE: 0
PIF_VALUE: 1
PIF_VALUE: 0
PIF_VALUE: 0
PIF_VALUE: 1
PIF_VALUE: 0
PIF_VALUE: 1

## 2018-06-14 ASSESSMENT — PAIN SCALES - GENERAL
PAINLEVEL_OUTOF10: 0

## 2018-06-14 ASSESSMENT — PAIN - FUNCTIONAL ASSESSMENT: PAIN_FUNCTIONAL_ASSESSMENT: 0-10

## 2018-06-14 ASSESSMENT — ENCOUNTER SYMPTOMS: SHORTNESS OF BREATH: 0

## 2018-06-19 LAB
CULTURE: ABNORMAL
CULTURE: NORMAL
DIRECT EXAM: ABNORMAL
DIRECT EXAM: NORMAL
DIRECT EXAM: NORMAL
Lab: ABNORMAL
Lab: ABNORMAL
Lab: NORMAL
ORGANISM: ABNORMAL
SPECIMEN DESCRIPTION: ABNORMAL
SPECIMEN DESCRIPTION: ABNORMAL
SPECIMEN DESCRIPTION: NORMAL
STATUS: ABNORMAL
STATUS: ABNORMAL
STATUS: NORMAL

## 2018-06-20 ENCOUNTER — OFFICE VISIT (OUTPATIENT)
Dept: PODIATRY | Age: 68
End: 2018-06-20
Payer: COMMERCIAL

## 2018-06-20 VITALS
DIASTOLIC BLOOD PRESSURE: 72 MMHG | HEART RATE: 63 BPM | HEIGHT: 71 IN | SYSTOLIC BLOOD PRESSURE: 136 MMHG | WEIGHT: 201.4 LBS | BODY MASS INDEX: 28.19 KG/M2

## 2018-06-20 DIAGNOSIS — I73.9 PAD (PERIPHERAL ARTERY DISEASE) (HCC): ICD-10-CM

## 2018-06-20 DIAGNOSIS — S98.131A AMPUTATED TOE OF RIGHT FOOT (HCC): ICD-10-CM

## 2018-06-20 DIAGNOSIS — Z98.890 S/P FOOT SURGERY, RIGHT: Primary | ICD-10-CM

## 2018-06-20 DIAGNOSIS — E11.40 TYPE 2 DIABETES MELLITUS WITH DIABETIC NEUROPATHY, UNSPECIFIED LONG TERM INSULIN USE STATUS: ICD-10-CM

## 2018-06-20 PROCEDURE — 99213 OFFICE O/P EST LOW 20 MIN: CPT | Performed by: PODIATRIST

## 2018-06-20 PROCEDURE — 99024 POSTOP FOLLOW-UP VISIT: CPT | Performed by: PODIATRIST

## 2018-06-20 ASSESSMENT — ENCOUNTER SYMPTOMS
VOMITING: 0
NAUSEA: 0
SHORTNESS OF BREATH: 0

## 2018-06-27 ENCOUNTER — OFFICE VISIT (OUTPATIENT)
Dept: PODIATRY | Age: 68
End: 2018-06-27
Payer: COMMERCIAL

## 2018-06-27 VITALS
WEIGHT: 201.5 LBS | DIASTOLIC BLOOD PRESSURE: 72 MMHG | HEIGHT: 71 IN | SYSTOLIC BLOOD PRESSURE: 132 MMHG | BODY MASS INDEX: 28.21 KG/M2 | HEART RATE: 77 BPM

## 2018-06-27 DIAGNOSIS — L97.521 TOE ULCER, LEFT, LIMITED TO BREAKDOWN OF SKIN (HCC): ICD-10-CM

## 2018-06-27 DIAGNOSIS — I73.9 PAD (PERIPHERAL ARTERY DISEASE) (HCC): Primary | ICD-10-CM

## 2018-06-27 DIAGNOSIS — E11.40 TYPE 2 DIABETES MELLITUS WITH DIABETIC NEUROPATHY, UNSPECIFIED LONG TERM INSULIN USE STATUS: ICD-10-CM

## 2018-06-27 PROCEDURE — 99024 POSTOP FOLLOW-UP VISIT: CPT | Performed by: STUDENT IN AN ORGANIZED HEALTH CARE EDUCATION/TRAINING PROGRAM

## 2018-06-27 PROCEDURE — 99213 OFFICE O/P EST LOW 20 MIN: CPT | Performed by: STUDENT IN AN ORGANIZED HEALTH CARE EDUCATION/TRAINING PROGRAM

## 2018-06-27 ASSESSMENT — ENCOUNTER SYMPTOMS
NAUSEA: 0
VOMITING: 0
SHORTNESS OF BREATH: 0

## 2018-07-11 ENCOUNTER — HOSPITAL ENCOUNTER (OUTPATIENT)
Age: 68
Discharge: HOME | End: 2018-07-13
Payer: COMMERCIAL

## 2018-07-11 ENCOUNTER — OFFICE VISIT (OUTPATIENT)
Dept: PODIATRY | Age: 68
End: 2018-07-11
Payer: COMMERCIAL

## 2018-07-11 ENCOUNTER — HOSPITAL ENCOUNTER (OUTPATIENT)
Dept: GENERAL RADIOLOGY | Age: 68
Discharge: HOME | End: 2018-07-13
Payer: COMMERCIAL

## 2018-07-11 VITALS
SYSTOLIC BLOOD PRESSURE: 129 MMHG | HEIGHT: 71 IN | DIASTOLIC BLOOD PRESSURE: 78 MMHG | WEIGHT: 202 LBS | BODY MASS INDEX: 28.28 KG/M2 | HEART RATE: 77 BPM | TEMPERATURE: 98.7 F

## 2018-07-11 DIAGNOSIS — Z89.422 S/P AMPUTATION OF LESSER TOE, LEFT (HCC): ICD-10-CM

## 2018-07-11 DIAGNOSIS — L97.521 TOE ULCER, LEFT, LIMITED TO BREAKDOWN OF SKIN (HCC): ICD-10-CM

## 2018-07-11 DIAGNOSIS — I73.9 PAD (PERIPHERAL ARTERY DISEASE) (HCC): ICD-10-CM

## 2018-07-11 DIAGNOSIS — L85.3 DRY SKIN: ICD-10-CM

## 2018-07-11 DIAGNOSIS — M20.41 HAMMER TOES OF BOTH FEET: ICD-10-CM

## 2018-07-11 DIAGNOSIS — S98.131A AMPUTATED TOE OF RIGHT FOOT (HCC): ICD-10-CM

## 2018-07-11 DIAGNOSIS — E11.40 TYPE 2 DIABETES MELLITUS WITH DIABETIC NEUROPATHY, UNSPECIFIED LONG TERM INSULIN USE STATUS: ICD-10-CM

## 2018-07-11 DIAGNOSIS — M20.42 HAMMER TOES OF BOTH FEET: ICD-10-CM

## 2018-07-11 DIAGNOSIS — B35.1 ONYCHOMYCOSIS: ICD-10-CM

## 2018-07-11 DIAGNOSIS — I73.9 PAD (PERIPHERAL ARTERY DISEASE) (HCC): Primary | ICD-10-CM

## 2018-07-11 PROCEDURE — 99213 OFFICE O/P EST LOW 20 MIN: CPT | Performed by: STUDENT IN AN ORGANIZED HEALTH CARE EDUCATION/TRAINING PROGRAM

## 2018-07-11 PROCEDURE — 11720 DEBRIDE NAIL 1-5: CPT | Performed by: STUDENT IN AN ORGANIZED HEALTH CARE EDUCATION/TRAINING PROGRAM

## 2018-07-11 PROCEDURE — 99024 POSTOP FOLLOW-UP VISIT: CPT | Performed by: STUDENT IN AN ORGANIZED HEALTH CARE EDUCATION/TRAINING PROGRAM

## 2018-07-11 PROCEDURE — 73630 X-RAY EXAM OF FOOT: CPT

## 2018-07-11 ASSESSMENT — ENCOUNTER SYMPTOMS
SHORTNESS OF BREATH: 0
VOMITING: 0
NAUSEA: 0

## 2018-07-11 NOTE — PROGRESS NOTES
60097 - TN DEBRIDEMENT OF NAIL(S), 1-5    XR FOOT LEFT (MIN 3 VIEWS)   2. Type 2 diabetes mellitus with diabetic neuropathy, unspecified long term insulin use status ([facility]     3. Toe ulcer, left, limited to breakdown of skin ([facility])  XR FOOT LEFT (MIN 3 VIEWS)   4. Hammer toes of both feet     5. Amputated toe of right foot ([facility]     6. Onychomycosis  96533 - TN DEBRIDEMENT OF NAIL(S), 1-5   7. Dry skin     8.  S/P amputation of lesser toe, left ([facility])   - TN REMOVAL OF SUTURES    XR FOOT LEFT (MIN 3 VIEWS)      Plan:   · Pt seen and examined with Dr. Radha Caal  · Treatment options discussed with pt and attending  · Cleansed wound with saline - wound bed is hyperkeratotic/fibrotic/dry necrotic, no signs of active infection  · Sutures removed without incident  · Dressed wound with small piece of fibracol/DSD, order for [facility] to change every other day  · Keep dressing clean and dry  · Ordered left foot xray WB 3 views to assess for residual L 2nd digit middle/prox phalanx osteo   · Continue ambulation in surgical shoe  · Discussed with patient his dry gangrenous changes may require a more proximal amputation  · Plan to f/u xray and discuss case with Dr. Alin Weaver next week  · RTC in 1 wk or sooner if signs of infection occur    Electronically signed by Ginger Oliva DPM on 7/11/2018 at 1:47 PM

## 2018-07-11 NOTE — PATIENT INSTRUCTIONS
Left 2nd toe amputation site - every other day small piece of fibracol with 4x4 gauze and carolina, and tape by [facility]

## 2018-07-18 ENCOUNTER — OFFICE VISIT (OUTPATIENT)
Dept: PODIATRY | Age: 68
End: 2018-07-18
Payer: COMMERCIAL

## 2018-07-18 VITALS
SYSTOLIC BLOOD PRESSURE: 125 MMHG | HEART RATE: 76 BPM | HEIGHT: 71 IN | WEIGHT: 202.4 LBS | BODY MASS INDEX: 28.34 KG/M2 | DIASTOLIC BLOOD PRESSURE: 67 MMHG

## 2018-07-18 DIAGNOSIS — M20.41 HAMMER TOES OF BOTH FEET: ICD-10-CM

## 2018-07-18 DIAGNOSIS — L97.521 TOE ULCER, LEFT, LIMITED TO BREAKDOWN OF SKIN (HCC): ICD-10-CM

## 2018-07-18 DIAGNOSIS — E11.40 TYPE 2 DIABETES MELLITUS WITH DIABETIC NEUROPATHY, UNSPECIFIED LONG TERM INSULIN USE STATUS: Primary | ICD-10-CM

## 2018-07-18 DIAGNOSIS — M20.42 HAMMER TOES OF BOTH FEET: ICD-10-CM

## 2018-07-18 DIAGNOSIS — I73.9 PAD (PERIPHERAL ARTERY DISEASE) (HCC): ICD-10-CM

## 2018-07-18 PROCEDURE — 99212 OFFICE O/P EST SF 10 MIN: CPT | Performed by: STUDENT IN AN ORGANIZED HEALTH CARE EDUCATION/TRAINING PROGRAM

## 2018-07-18 PROCEDURE — 11042 DBRDMT SUBQ TIS 1ST 20SQCM/<: CPT | Performed by: STUDENT IN AN ORGANIZED HEALTH CARE EDUCATION/TRAINING PROGRAM

## 2018-07-18 PROCEDURE — 99024 POSTOP FOLLOW-UP VISIT: CPT | Performed by: STUDENT IN AN ORGANIZED HEALTH CARE EDUCATION/TRAINING PROGRAM

## 2018-07-18 ASSESSMENT — ENCOUNTER SYMPTOMS
NAUSEA: 0
VOMITING: 0
SHORTNESS OF BREATH: 0

## 2018-07-18 NOTE — PROGRESS NOTES
Hammer toes of both feet          Plan:   · Pt seen and examined with Dr. Padmini Sosa  · Treatment options discussed with pt and attending  · Reviewed Xr with patient- no signs of osteomyelitis noted   · He is allowed to return to work in 1 week  · Can return to normal shoe gear starting tomorrow   · RTC in 1 month or sooner if signs of infection occur    Electronically signed by Shavon Harrington DPM on 8/8/2018 at 3:10 PM

## 2018-07-18 NOTE — PROGRESS NOTES
Patient instructed to remove shoes and socks, instructed to sit in exam chair. Current PCP name is Dr. Leonie Diaz and date of last visit 6/16/17. Do you have a follow up visit scheduled? Yes    If yes the date is 7/19/18   Diabetic visit information    Blood pressure (Control is BP <140/90)  BP Readings from Last 3 Encounters:   07/11/18 129/78   06/27/18 132/72   06/20/18 136/72       BP taken with correct size cuff? - Yes   Repeated if > 140/90 NA      Tobacco use:  Patient  reports that he has never smoked. He has never used smokeless tobacco.  If Smoker - Cessation materials given?- NA       Diabetic Health Maintenance Items due  Diabetes Management   Topic Date Due    Diabetic retinal exam  07/16/1958    Diabetic microalbuminuria test  12/15/2016       Diabetic retinal exam done in last year? - Yes   If No: remind patient that it is due and they should schedule an exam    Medications  Is patient taking any medications for diabetes? -   Yes  Have blood sugars been controlled? Fasting blood sugars under 120   -   Yes   Random home sugars or today's POCT glucose is under 180 -   Yes   []  If No to the above then patient should schedule appt with PCP. Diabetic Plan    A1C Plan  Lab Results   Component Value Date    LABA1C 8.2 (H) 12/13/2017    LABA1C 8.0 (H) 08/23/2012      []  If A1C over 8 and last result >3 months ago - Order A1C and refer to PCP   []  If last A1C over 6 months ago - Order A1C and refer to PCP for follow up   []  If elevated blood sugars > 180 - refer to PCP for follow up    []  Blood sugar controlled - A1C under 8 and last check was < 6 months      Cholesterol Plan   Lab Results   Component Value Date    LDLCHOLESTEROL 22 12/14/2017      []  If LDL > 100 and last result >3 months ago - order Fasting lipids and refer to PCP for follow up   []  If LDL < 100 and over 1 year ago - Order Fasting lipids and refer to PCP for follow up   [] LDL is controlled.   LDL < 100 and checked within the last year     Blood Pressure  BP Readings from Last 3 Encounters:   07/11/18 129/78   06/27/18 132/72   06/20/18 136/72      []  If SBP >140 mmhg - refer to PCP for follow up   []  If DBP > 90 mmhg - refer to PCP for follow up   [] BP is controlled <140/90     Order labs as PCP ordered.   (ie: Lipids, A1C, CMP)

## 2018-08-18 ENCOUNTER — TELEPHONE (OUTPATIENT)
Dept: INTERNAL MEDICINE CLINIC | Age: 68
End: 2018-08-18

## 2018-08-22 ENCOUNTER — HOSPITAL ENCOUNTER (OUTPATIENT)
Age: 68
Setting detail: SPECIMEN
Discharge: HOME | End: 2018-08-22
Payer: COMMERCIAL

## 2018-08-22 ENCOUNTER — OFFICE VISIT (OUTPATIENT)
Dept: PODIATRY | Age: 68
End: 2018-08-22
Payer: COMMERCIAL

## 2018-08-22 VITALS
WEIGHT: 199.4 LBS | SYSTOLIC BLOOD PRESSURE: 148 MMHG | HEART RATE: 84 BPM | DIASTOLIC BLOOD PRESSURE: 84 MMHG | HEIGHT: 71 IN | BODY MASS INDEX: 27.92 KG/M2

## 2018-08-22 DIAGNOSIS — Z89.422 S/P AMPUTATION OF LESSER TOE, LEFT (HCC): ICD-10-CM

## 2018-08-22 DIAGNOSIS — Z98.890 S/P FOOT SURGERY, RIGHT: ICD-10-CM

## 2018-08-22 DIAGNOSIS — L97.519 TYPE 2 DIABETES MELLITUS WITH RIGHT DIABETIC FOOT ULCER (HCC): Primary | ICD-10-CM

## 2018-08-22 DIAGNOSIS — I73.9 PAD (PERIPHERAL ARTERY DISEASE) (HCC): ICD-10-CM

## 2018-08-22 DIAGNOSIS — L97.511 ULCER OF FOOT, RIGHT, LIMITED TO BREAKDOWN OF SKIN (HCC): ICD-10-CM

## 2018-08-22 DIAGNOSIS — B35.1 ONYCHOMYCOSIS: ICD-10-CM

## 2018-08-22 DIAGNOSIS — E11.621 TYPE 2 DIABETES MELLITUS WITH RIGHT DIABETIC FOOT ULCER (HCC): Primary | ICD-10-CM

## 2018-08-22 PROCEDURE — 99024 POSTOP FOLLOW-UP VISIT: CPT | Performed by: PODIATRIST

## 2018-08-22 PROCEDURE — 11721 DEBRIDE NAIL 6 OR MORE: CPT | Performed by: PODIATRIST

## 2018-08-22 PROCEDURE — 99212 OFFICE O/P EST SF 10 MIN: CPT | Performed by: PODIATRIST

## 2018-08-22 PROCEDURE — 11042 DBRDMT SUBQ TIS 1ST 20SQCM/<: CPT | Performed by: PODIATRIST

## 2018-08-22 RX ORDER — BACITRACIN ZINC AND POLYMYXIN B SULFATE 500; 1000 [USP'U]/G; [USP'U]/G
OINTMENT TOPICAL
Qty: 1 TUBE | Refills: 1 | Status: SHIPPED | OUTPATIENT
Start: 2018-08-22 | End: 2018-08-29

## 2018-08-22 ASSESSMENT — ENCOUNTER SYMPTOMS
SHORTNESS OF BREATH: 0
VOMITING: 0
NAUSEA: 0

## 2018-08-22 NOTE — PROGRESS NOTES
[facility] Podiatry Clinic Progress Note     Subjective:   Evelyn Wilson is a 77 y.o. male who presents for f/u of healed L 2nd distal Syme's amputation (DOS 6/14/18, Dr. Cristiana Aguilar). He states he started back to work 2 weeks ago. Patient is a  at [employer] and works on his feet for 11 hours. Patient denies any new problems. Patient states that his blood sugar was 89 mg/dl this am. Denies any nausea, vomiting, fever, chills. Review of Systems   Constitutional: Negative for chills, fever and malaise/fatigue. Respiratory: Negative for shortness of breath. Cardiovascular: Negative for chest pain. Gastrointestinal: Negative for nausea and vomiting. Objective:     Vitals:    08/22/18 1248   BP: (!) 148/84   Pulse:       Lab Results   Component Value Date    LABA1C 8.2 (H) 12/13/2017     Physical Exam  Vascular: DP and PT pulses non palpable jeanne; audible on doppler, jeanne. Hair growth absent to the level of the digits, Bilateral.  CFT <5 seconds digits 1,4,5 right. CFT < 5 seconds digits 1,3,4,5 left. Neurological: Light touch sensation diminished to the digits, Bilateral.       Musculoskeletal: Muscle strength 5/5, Right. S/p Right 2nd and 3rd digit amputation. S/p partial left 2nd digit amputation.        Integument: Extensive hyperkeratotic tissue noted sub 5th metatarsal head, right. Nails thickened, elongated with subungual debris 1,4,5 right and 1, 3-5 left. Area of boggy tissue present on plantar medial aspect of right hallux. Ulcer measures approximately 3 x 3 x 0.2 cm. Does not probe to bone. Serosanguinous drainage found. MEI/PVR b/l LE 12/11/17  MEI: Right: 0.97, Left: 0.97  TBI: R 0.49, L 0.4    Culture:  Obtained today. Assessment:      Diagnosis Orders   1. Type 2 diabetes mellitus with right diabetic foot ulcer ([address]  External Referral To Home Health    Anaerobic And Aerobic Culture    TX DEBRIDEMENT, SKIN, SUB-Q TISSUE,=<20 SQ CM   2. S/P foot surgery, right     3.  S/P amputation of lesser toe, left ([facility]     4. PAD (peripheral artery disease) ([facility]     5. Ulcer of foot, right, limited to breakdown of skin ([address]     6. Onychomycosis  28502 - AR DEBRIDEMENT OF NAILS, 6 OR MORE        Plan:   · Pt seen and examined with Dr. Chloe Solorio  · Treatment options discussed with pt and attending  · Debridement of nails bilateral feet with sterile nail nippers without incident. · Excisional debridement of wound down to the level of subcutaneous tissue with #15 blade. Flushed with saline. Cultures obtained. Dressed with bacitracin, gauze, kerlix. · Offloading of hallux with felt pad on accommodative inserts. · Rx for bacitracin   · Order for anaerobic/aerobic cultures. · Order for home health care to change his dressings. · RTC on Friday or Wednesday.     Electronically signed by Rocky Daniel DPM on 8/22/2018 at 1:49 PM

## 2018-08-22 NOTE — PROGRESS NOTES
Patient instructed to remove shoes and socks, instructed to sit in exam chair. Current PCP name is Dr Ron Bañuelos and date of last visit 7-11-18. Do you have a follow up visit scheduled? Yes or no    If yes the date is NO  Diabetic visit information    Blood pressure (Control is BP <140/90)  BP Readings from Last 3 Encounters:   07/18/18 125/67   07/11/18 129/78   06/27/18 132/72       BP taken with correct size cuff? - Yes   Repeated if > 140/90 NA      Tobacco use:  Patient  reports that he has never smoked. He has never used smokeless tobacco.  If Smoker - Cessation materials given?- NA       Diabetic Health Maintenance Items due  Diabetes Management   Topic Date Due    Diabetic retinal exam  07/16/1958    Diabetic microalbuminuria test  12/15/2016       Diabetic retinal exam done in last year? - Yes   If No: remind patient that it is due and they should schedule an exam    Medications  Is patient taking any medications for diabetes? -   Yes  Have blood sugars been controlled? Fasting blood sugars under 120   -   Yes   Random home sugars or today's POCT glucose is under 180 -   Yes   []  If No to the above then patient should schedule appt with PCP. Diabetic Plan    A1C Plan  Lab Results   Component Value Date    LABA1C 8.2 (H) 12/13/2017    LABA1C 8.0 (H) 08/23/2012      []  If A1C over 8 and last result >3 months ago - Order A1C and refer to PCP   []  If last A1C over 6 months ago - Order A1C and refer to PCP for follow up   []  If elevated blood sugars > 180 - refer to PCP for follow up    []  Blood sugar controlled - A1C under 8 and last check was < 6 months      Cholesterol Plan   Lab Results   Component Value Date    LDLCHOLESTEROL 22 12/14/2017      []  If LDL > 100 and last result >3 months ago - order Fasting lipids and refer to PCP for follow up   []  If LDL < 100 and over 1 year ago - Order Fasting lipids and refer to PCP for follow up   [] LDL is controlled.   LDL < 100 and checked within the

## 2018-08-29 ENCOUNTER — OFFICE VISIT (OUTPATIENT)
Dept: PODIATRY | Age: 68
End: 2018-08-29
Payer: COMMERCIAL

## 2018-08-29 VITALS
SYSTOLIC BLOOD PRESSURE: 126 MMHG | BODY MASS INDEX: 28.14 KG/M2 | HEART RATE: 70 BPM | DIASTOLIC BLOOD PRESSURE: 64 MMHG | WEIGHT: 201 LBS | HEIGHT: 71 IN

## 2018-08-29 DIAGNOSIS — L97.519 TYPE 2 DIABETES MELLITUS WITH RIGHT DIABETIC FOOT ULCER (HCC): Primary | ICD-10-CM

## 2018-08-29 DIAGNOSIS — L97.511 ULCER OF FOOT, RIGHT, LIMITED TO BREAKDOWN OF SKIN (HCC): ICD-10-CM

## 2018-08-29 DIAGNOSIS — E11.621 TYPE 2 DIABETES MELLITUS WITH RIGHT DIABETIC FOOT ULCER (HCC): Primary | ICD-10-CM

## 2018-08-29 PROCEDURE — 99212 OFFICE O/P EST SF 10 MIN: CPT | Performed by: PODIATRIST

## 2018-08-29 PROCEDURE — 11042 DBRDMT SUBQ TIS 1ST 20SQCM/<: CPT | Performed by: PODIATRIST

## 2018-08-29 PROCEDURE — 99024 POSTOP FOLLOW-UP VISIT: CPT | Performed by: PODIATRIST

## 2018-08-29 ASSESSMENT — ENCOUNTER SYMPTOMS
NAUSEA: 0
SHORTNESS OF BREATH: 0
VOMITING: 0

## 2018-08-29 NOTE — PROGRESS NOTES
Patient instructed to remove shoes and socks, instructed to sit in exam chair. Current PCP name is  and date of last visit 7/11/18. Do you have a follow up visit scheduled? No  Diabetic visit information    Blood pressure (Control is BP <140/90)  BP Readings from Last 3 Encounters:   08/29/18 126/64   08/22/18 (!) 148/84   07/18/18 125/67       BP taken with correct size cuff? - Yes   Repeated if > 140/90 No      Tobacco use:  Patient  reports that he has never smoked. He has never used smokeless tobacco.  If Smoker - Cessation materials given?- No       Diabetic Health Maintenance Items due  Diabetes Management   Topic Date Due    Diabetic retinal exam  07/16/1958    Diabetic microalbuminuria test  12/15/2016       Diabetic retinal exam done in last year? - Yes   If No: remind patient that it is due and they should schedule an exam    Medications  Is patient taking any medications for diabetes? -   Yes  Have blood sugars been controlled? Fasting blood sugars under 120   -   Yes   Random home sugars or today's POCT glucose is under 180 -   Yes   []  If No to the above then patient should schedule appt with PCP. Diabetic Plan    A1C Plan  Lab Results   Component Value Date    LABA1C 8.2 (H) 12/13/2017    LABA1C 8.0 (H) 08/23/2012      []  If A1C over 8 and last result >3 months ago - Order A1C and refer to PCP   []  If last A1C over 6 months ago - Order A1C and refer to PCP for follow up   []  If elevated blood sugars > 180 - refer to PCP for follow up    []  Blood sugar controlled - A1C under 8 and last check was < 6 months      Cholesterol Plan   Lab Results   Component Value Date    LDLCHOLESTEROL 22 12/14/2017      []  If LDL > 100 and last result >3 months ago - order Fasting lipids and refer to PCP for follow up   []  If LDL < 100 and over 1 year ago - Order Fasting lipids and refer to PCP for follow up   [] LDL is controlled.   LDL < 100 and checked within the last year     Blood Pressure  BP Readings from Last 3 Encounters:   08/29/18 126/64   08/22/18 (!) 148/84   07/18/18 125/67      []  If SBP >140 mmhg - refer to PCP for follow up   []  If DBP > 90 mmhg - refer to PCP for follow up   [] BP is controlled <140/90     Order labs as PCP ordered.   (ie: Lipids, A1C, CMP)

## 2018-08-29 NOTE — PROGRESS NOTES
[facility] Podiatry Clinic Progress Note     Subjective:   Emmett Abel is a 77 y.o. male who presents for f/u of wound on right great toe. Pt is s/p L 2nd distal Syme's amputation (DOS 6/14/18, Dr. Sun Vegas). Patient has continued to work. Patient has been wearing offloading inserts in his work shoes. Patient states that he has been changing the dressing on his wound by himself with bacitracin and bandage. Patient states that his blood sugar was 70 mg/dl this am. Denies any nausea, vomiting, fever, chills. Review of Systems   Constitutional: Negative for chills, fever and malaise/fatigue. Respiratory: Negative for shortness of breath. Cardiovascular: Negative for chest pain. Gastrointestinal: Negative for nausea and vomiting. Objective:     Vitals:    08/29/18 1254   BP: 126/64   Pulse: 70      Lab Results   Component Value Date    LABA1C 8.2 (H) 12/13/2017     Physical Exam  Vascular: DP and PT pulses non palpable jeanne; audible on doppler, jeanne. Hair growth absent to the level of the digits, Bilateral.  CFT <5 seconds digits 1,4,5 right. CFT < 5 seconds digits 1,3,4,5 left. Neurological: Light touch sensation diminished to the digits, Bilateral.       Musculoskeletal: Muscle strength 5/5, Right. S/p Right 2nd and 3rd digit amputation. S/p partial left 2nd digit amputation.        Integument:  Nails thickened, elongated with subungual debris 1,4,5 right and 1, 3-5 left. Ulcer present on medial aspect of right hallux. Ulcer measures approximately 1.5 x 1.0 x 0.2 cm (Previously 3 x 3 x 0.2cm). Does not probe to bone. Granular wound base. MEI/PVR b/l LE 12/11/17  MEI: Right: 0.97, Left: 0.97  TBI: R 0.49, L 0.4    Culture: No bacterial growth. Assessment:      Diagnosis Orders   1. Type 2 diabetes mellitus with right diabetic foot ulcer ([address]     2.  Ulcer of foot, right, limited to breakdown of skin ([address]           Plan:   · Pt seen and examined with Dr. Dandy Blanc  · Treatment options discussed with pt and attending  · Excisional debridement of wound down to the level of subcutaneous tissue with a dermal curette. Flushed with saline. Dressed with bacitracin, gauze, kerlix. · Pt to continue dressing with bacitracin and dry sterile dressing. · Pt to continue wearing offloading shoes. · Discussed with patient that he is at high risk for not healing the wound due to peripheral arterial disease and long hours on feet at work. Discussed with patient that if the wound doesn't heal he may need to retire. · RTC in 1 week.     Electronically signed by Lavone Kocher, DPM on 8/29/2018 at 2:09 PM

## 2018-09-05 ENCOUNTER — OFFICE VISIT (OUTPATIENT)
Dept: PODIATRY | Age: 68
End: 2018-09-05
Payer: COMMERCIAL

## 2018-09-05 VITALS
HEART RATE: 66 BPM | HEIGHT: 71 IN | BODY MASS INDEX: 28.19 KG/M2 | WEIGHT: 201.4 LBS | SYSTOLIC BLOOD PRESSURE: 134 MMHG | DIASTOLIC BLOOD PRESSURE: 68 MMHG

## 2018-09-05 DIAGNOSIS — S91.301A OPEN WOUND OF RIGHT FOOT, INITIAL ENCOUNTER: ICD-10-CM

## 2018-09-05 DIAGNOSIS — E11.51 TYPE 2 DIABETES MELLITUS WITH DIABETIC PERIPHERAL ANGIOPATHY WITHOUT GANGRENE, WITHOUT LONG-TERM CURRENT USE OF INSULIN (HCC): Primary | ICD-10-CM

## 2018-09-05 LAB — HBA1C MFR BLD: 8.2 %

## 2018-09-05 PROCEDURE — 99212 OFFICE O/P EST SF 10 MIN: CPT | Performed by: STUDENT IN AN ORGANIZED HEALTH CARE EDUCATION/TRAINING PROGRAM

## 2018-09-05 PROCEDURE — 99213 OFFICE O/P EST LOW 20 MIN: CPT | Performed by: STUDENT IN AN ORGANIZED HEALTH CARE EDUCATION/TRAINING PROGRAM

## 2018-09-05 PROCEDURE — 83036 HEMOGLOBIN GLYCOSYLATED A1C: CPT | Performed by: STUDENT IN AN ORGANIZED HEALTH CARE EDUCATION/TRAINING PROGRAM

## 2018-09-05 ASSESSMENT — ENCOUNTER SYMPTOMS
SHORTNESS OF BREATH: 0
VOMITING: 0
NAUSEA: 0

## 2018-09-05 NOTE — PROGRESS NOTES
Patient instructed to remove shoes and socks, instructed to sit in exam chair. Current PCP name is Dr Nohemy Mcclendon  and date of last visit 8-22-18. Do you have a follow up visit scheduled? Yes or no    If yes the date is   NO    Diabetic visit information    Blood pressure (Control is BP <140/90)  BP Readings from Last 3 Encounters:   08/29/18 126/64   08/22/18 (!) 148/84   07/18/18 125/67       BP taken with correct size cuff? - Yes   Repeated if > 140/90 Yes      Tobacco use:  Patient  reports that he has never smoked. He has never used smokeless tobacco.  If Smoker - Cessation materials given?- No       Diabetic Health Maintenance Items due  Diabetes Management   Topic Date Due    Diabetic retinal exam  07/16/1958    Diabetic microalbuminuria test  12/15/2016       Diabetic retinal exam done in last year? - Yes   If No: remind patient that it is due and they should schedule an exam    Medications  Is patient taking any medications for diabetes? -   Yes  Have blood sugars been controlled? Fasting blood sugars under 120   -   Yes   Random home sugars or today's POCT glucose is under 180 -   Yes   []  If No to the above then patient should schedule appt with PCP. Diabetic Plan    A1C Plan  Lab Results   Component Value Date    LABA1C 8.2 (H) 12/13/2017    LABA1C 8.0 (H) 08/23/2012      []  If A1C over 8 and last result >3 months ago - Order A1C and refer to PCP   []  If last A1C over 6 months ago - Order A1C and refer to PCP for follow up   []  If elevated blood sugars > 180 - refer to PCP for follow up    []  Blood sugar controlled - A1C under 8 and last check was < 6 months      Cholesterol Plan   Lab Results   Component Value Date    LDLCHOLESTEROL 22 12/14/2017      []  If LDL > 100 and last result >3 months ago - order Fasting lipids and refer to PCP for follow up   []  If LDL < 100 and over 1 year ago - Order Fasting lipids and refer to PCP for follow up   [] LDL is controlled.   LDL < 100 and checked

## 2018-09-05 NOTE — PROGRESS NOTES
[facility] Podiatry Clinic Progress Note     Subjective:   Rosalino Samano is a 77 y.o. male who presents for f/u of wound on right great toe. Pt is s/p L 2nd distal Syme's amputation (DOS 6/14/18, Dr. Lamont Mitchell). Patient has continued to work. Patient has been wearing his work boots daily. Patient states that he has been changing the dressing on his wound by himself with bacitracin and bandage. Patient admits he just started working at his new job this week and requires to wear heavy boots on the job. Denies any nausea, vomiting, fever, chills. Review of Systems   Constitutional: Negative for chills, fever and malaise/fatigue. Respiratory: Negative for shortness of breath. Cardiovascular: Negative for chest pain. Gastrointestinal: Negative for nausea and vomiting. Objective:     Vitals:    09/05/18 1258   BP: 134/68   Pulse: 66      Lab Results   Component Value Date    LABA1C 8.2 09/05/2018     Physical Exam  Vascular: DP and PT pulses non palpable jeanne; audible on doppler, jeanne. Hair growth absent to the level of the digits, Bilateral.  CFT <5 seconds digits 1,4,5 right. CFT < 5 seconds digits 1,3,4,5 left. Neurological: Light touch sensation diminished to the digits, Bilateral.       Musculoskeletal: Muscle strength 5/5, Right. S/p Right 2nd and 3rd digit amputation. S/p partial left 2nd digit amputation.        Integument:  Nails thickened, elongated with subungual debris 1,4,5 right and 1, 3-5 left. Ulcer present on medial aspect of right hallux. Ulcer measures approximately 1.3 x 1.0 x 0.2 cm (Previously 1.5 x 1.0 x 0.2). Does not probe to bone. Granular wound base. MEI/PVR b/l LE 12/11/17  MEI: Right: 0.97, Left: 0.97  TBI: R 0.49, L 0.4    Culture: No bacterial growth. Assessment:      Diagnosis Orders   1.  Type 2 diabetes mellitus with diabetic peripheral angiopathy and gangrene, without long-term current use of insulin ([facility])  POCT glycosylated hemoglobin (Hb A1C)         Plan:   · Pt seen and examined   · Treatment options discussed with pt and attending  · Wound dressing was changed with Fibracol, bandaid. Continue with Fibracol, DSD. · Pt continues to work and states he just started this job and has bills to pay and can not afford to not work. He wears steel toe boots every day. · Discussed with patient that he is at high risk for not healing the wound due to peripheral arterial disease and long hours on feet at work. Discussed with patient that if the wound doesn't heal he may need to retire. · RTC in 1 week.   · Discussed with Dr. Sun Vegas    Electronically signed by Venecia Velasquez DPM on 9/5/2018 at 1:49 PM

## 2018-09-06 ENCOUNTER — HOSPITAL ENCOUNTER (OUTPATIENT)
Dept: GENERAL RADIOLOGY | Age: 68
Discharge: HOME | End: 2018-09-08
Payer: COMMERCIAL

## 2018-09-06 ENCOUNTER — HOSPITAL ENCOUNTER (OUTPATIENT)
Age: 68
Discharge: HOME | End: 2018-09-08
Payer: COMMERCIAL

## 2018-09-06 DIAGNOSIS — S91.301A OPEN WOUND OF RIGHT FOOT, INITIAL ENCOUNTER: ICD-10-CM

## 2018-09-06 PROCEDURE — 73630 X-RAY EXAM OF FOOT: CPT

## 2018-09-12 ENCOUNTER — OFFICE VISIT (OUTPATIENT)
Dept: PODIATRY | Age: 68
End: 2018-09-12
Payer: COMMERCIAL

## 2018-09-12 VITALS
WEIGHT: 202 LBS | BODY MASS INDEX: 28.28 KG/M2 | HEIGHT: 71 IN | HEART RATE: 71 BPM | DIASTOLIC BLOOD PRESSURE: 64 MMHG | SYSTOLIC BLOOD PRESSURE: 132 MMHG

## 2018-09-12 DIAGNOSIS — L84 CORNS AND CALLOSITIES: ICD-10-CM

## 2018-09-12 DIAGNOSIS — L97.519 DIABETIC ULCER OF RIGHT GREAT TOE (HCC): Primary | ICD-10-CM

## 2018-09-12 DIAGNOSIS — E11.49 OTHER DIABETIC NEUROLOGICAL COMPLICATION ASSOCIATED WITH TYPE 2 DIABETES MELLITUS (HCC): ICD-10-CM

## 2018-09-12 DIAGNOSIS — L97.512 NON-PRESSURE CHRONIC ULCER OF OTHER PART OF RIGHT FOOT WITH FAT LAYER EXPOSED (HCC): ICD-10-CM

## 2018-09-12 DIAGNOSIS — E11.621 DIABETIC ULCER OF RIGHT GREAT TOE (HCC): Primary | ICD-10-CM

## 2018-09-12 PROCEDURE — 11055 PARING/CUTG B9 HYPRKER LES 1: CPT | Performed by: STUDENT IN AN ORGANIZED HEALTH CARE EDUCATION/TRAINING PROGRAM

## 2018-09-12 PROCEDURE — 11045 DBRDMT SUBQ TISS EACH ADDL: CPT | Performed by: STUDENT IN AN ORGANIZED HEALTH CARE EDUCATION/TRAINING PROGRAM

## 2018-09-12 PROCEDURE — 11042 DBRDMT SUBQ TIS 1ST 20SQCM/<: CPT | Performed by: STUDENT IN AN ORGANIZED HEALTH CARE EDUCATION/TRAINING PROGRAM

## 2018-09-12 NOTE — PROGRESS NOTES
year     Blood Pressure  BP Readings from Last 3 Encounters:   09/05/18 134/68   08/29/18 126/64   08/22/18 (!) 148/84      []  If SBP >140 mmhg - refer to PCP for follow up   []  If DBP > 90 mmhg - refer to PCP for follow up   [] BP is controlled <140/90     Order labs as PCP ordered.   (ie: Lipids, A1C, CMP)

## 2018-09-24 RX ORDER — AMMONIUM LACTATE 12 G/100G
LOTION TOPICAL
Qty: 400 BOTTLE | Refills: 0 | Status: SHIPPED | OUTPATIENT
Start: 2018-09-24 | End: 2018-10-23 | Stop reason: SDUPTHER

## 2018-10-03 ENCOUNTER — OFFICE VISIT (OUTPATIENT)
Dept: PODIATRY | Age: 68
End: 2018-10-03
Payer: COMMERCIAL

## 2018-10-03 ENCOUNTER — TELEPHONE (OUTPATIENT)
Dept: PODIATRY | Age: 68
End: 2018-10-03

## 2018-10-03 VITALS
HEIGHT: 71 IN | BODY MASS INDEX: 27.72 KG/M2 | WEIGHT: 198 LBS | HEART RATE: 67 BPM | SYSTOLIC BLOOD PRESSURE: 129 MMHG | DIASTOLIC BLOOD PRESSURE: 71 MMHG

## 2018-10-03 DIAGNOSIS — M20.11 VALGUS DEFORMITY OF BOTH GREAT TOES: ICD-10-CM

## 2018-10-03 DIAGNOSIS — M20.12 VALGUS DEFORMITY OF BOTH GREAT TOES: ICD-10-CM

## 2018-10-03 DIAGNOSIS — L97.512 SKIN ULCER OF RIGHT FOOT WITH FAT LAYER EXPOSED (HCC): Primary | ICD-10-CM

## 2018-10-03 DIAGNOSIS — L84 CALLUS OF FOOT: ICD-10-CM

## 2018-10-03 DIAGNOSIS — E11.42 DIABETIC POLYNEUROPATHY ASSOCIATED WITH TYPE 2 DIABETES MELLITUS (HCC): ICD-10-CM

## 2018-10-03 DIAGNOSIS — B35.1 DERMATOPHYTOSIS OF NAIL: ICD-10-CM

## 2018-10-03 PROCEDURE — 99213 OFFICE O/P EST LOW 20 MIN: CPT | Performed by: STUDENT IN AN ORGANIZED HEALTH CARE EDUCATION/TRAINING PROGRAM

## 2018-10-03 PROCEDURE — 99212 OFFICE O/P EST SF 10 MIN: CPT | Performed by: STUDENT IN AN ORGANIZED HEALTH CARE EDUCATION/TRAINING PROGRAM

## 2018-10-03 PROCEDURE — 11055 PARING/CUTG B9 HYPRKER LES 1: CPT | Performed by: STUDENT IN AN ORGANIZED HEALTH CARE EDUCATION/TRAINING PROGRAM

## 2018-10-03 PROCEDURE — 11042 DBRDMT SUBQ TIS 1ST 20SQCM/<: CPT | Performed by: STUDENT IN AN ORGANIZED HEALTH CARE EDUCATION/TRAINING PROGRAM

## 2018-10-10 ENCOUNTER — OFFICE VISIT (OUTPATIENT)
Dept: PODIATRY | Age: 68
End: 2018-10-10
Payer: COMMERCIAL

## 2018-10-10 VITALS
WEIGHT: 199 LBS | HEIGHT: 71 IN | SYSTOLIC BLOOD PRESSURE: 138 MMHG | HEART RATE: 81 BPM | DIASTOLIC BLOOD PRESSURE: 75 MMHG | BODY MASS INDEX: 27.86 KG/M2

## 2018-10-10 DIAGNOSIS — L97.511 ULCER OF TOE OF RIGHT FOOT, LIMITED TO BREAKDOWN OF SKIN (HCC): ICD-10-CM

## 2018-10-10 PROCEDURE — 99212 OFFICE O/P EST SF 10 MIN: CPT | Performed by: STUDENT IN AN ORGANIZED HEALTH CARE EDUCATION/TRAINING PROGRAM

## 2018-10-10 PROCEDURE — 11042 DBRDMT SUBQ TIS 1ST 20SQCM/<: CPT | Performed by: STUDENT IN AN ORGANIZED HEALTH CARE EDUCATION/TRAINING PROGRAM

## 2018-10-24 ENCOUNTER — OFFICE VISIT (OUTPATIENT)
Dept: PODIATRY | Age: 68
End: 2018-10-24
Payer: COMMERCIAL

## 2018-10-24 VITALS
WEIGHT: 196.6 LBS | BODY MASS INDEX: 27.52 KG/M2 | DIASTOLIC BLOOD PRESSURE: 76 MMHG | HEART RATE: 79 BPM | HEIGHT: 71 IN | SYSTOLIC BLOOD PRESSURE: 135 MMHG

## 2018-10-24 DIAGNOSIS — L84 CALLUS OF FOOT: ICD-10-CM

## 2018-10-24 DIAGNOSIS — L97.511 ULCER OF TOE OF RIGHT FOOT, LIMITED TO BREAKDOWN OF SKIN (HCC): ICD-10-CM

## 2018-10-24 DIAGNOSIS — E11.49 DIABETES MELLITUS TYPE 2 WITH NEUROLOGICAL MANIFESTATIONS (HCC): ICD-10-CM

## 2018-10-24 PROCEDURE — 11056 PARNG/CUTG B9 HYPRKR LES 2-4: CPT | Performed by: PODIATRIST

## 2018-10-24 PROCEDURE — 99212 OFFICE O/P EST SF 10 MIN: CPT | Performed by: PODIATRIST

## 2018-10-24 PROCEDURE — 11042 DBRDMT SUBQ TIS 1ST 20SQCM/<: CPT | Performed by: PODIATRIST

## 2018-10-24 RX ORDER — AMMONIUM LACTATE 12 G/100G
LOTION TOPICAL
Qty: 400 BOTTLE | Refills: 0 | Status: SHIPPED | OUTPATIENT
Start: 2018-10-24 | End: 2018-11-19 | Stop reason: SDUPTHER

## 2018-10-24 NOTE — PROGRESS NOTES
[facility] Progress Note     Subjective:   Violetta Thomas is a 77 y.o. male who presents for f/u of wound on right great toe. He has been changing his dressing daily with Fibracol and a band-aid. He has been ambulating in his diabetic boots. He denies pain to his toe. Patient states his nurse has been coming to his house to check on the wound. Denies N/F/V/C. Review of Systems   Constitutional: Negative for chills, fever and malaise/fatigue. Respiratory: Negative for shortness of breath. Cardiovascular: Negative for chest pain. Gastrointestinal: Negative for nausea and vomiting. Objective:     Vitals:    10/24/18 1304   BP: 135/76   Pulse: 79      Lab Results   Component Value Date    LABA1C 8.2 09/05/2018     Physical Exam  Vascular: DP and PT pulses non palpable jeanne; audible on doppler, jeanne. Hair growth absent to the level of the digits, Bilateral.  CFT <5 seconds digits 1,4,5 right. CFT < 5 seconds digits 1,3,4,5 left. Neurological: Light touch sensation diminished to the digits, Bilateral. Fine touch intact 7/9 on the right and 8/10 on the left.       Musculoskeletal: Muscle strength 5/5, Right. S/p Right 2nd and 3rd digit amputation. S/p partial left 2nd digit amputation.        Integument:  Nails thickened,normal length 1,4,5 right and 1, 3-5 left. Ulcer present on plantar medial aspect of right hallux. Base is red and granular. Periwound is hyperkeratotic. Does not probe to bone, undermine or track. Wound Measurement:  10/24/18: 0.8 x 0.2 x 0.1 cm  10/10/18: 1.4 x 0.4 x 0.1cm  10/03/18: 1.3 x 1.0 x 0.2cm  9/12/18: 1.8 x 1.1x 0.2cm      MEI/PVR b/l LE 12/11/17  MEI: Right: 0.97, Left: 0.97  TBI: R 0.49, L 0.4    Assessment:      Diagnosis Orders   1. Toe amputation status, right ([address]     2. Ulcer of toe of right foot, limited to breakdown of skin (HCC)  OH DEBRIDEMENT, SKIN, SUB-Q TISSUE,=<20 SQ CM   3. Callus of foot  TRIM BENIGN HYPERKERATOTIC SKIN LESION,2-4   4.  Diabetes

## 2018-11-07 ENCOUNTER — OFFICE VISIT (OUTPATIENT)
Dept: PODIATRY | Age: 68
End: 2018-11-07
Payer: COMMERCIAL

## 2018-11-07 VITALS
WEIGHT: 200 LBS | HEART RATE: 85 BPM | DIASTOLIC BLOOD PRESSURE: 73 MMHG | SYSTOLIC BLOOD PRESSURE: 129 MMHG | HEIGHT: 71 IN | BODY MASS INDEX: 28 KG/M2

## 2018-11-07 DIAGNOSIS — E11.49 DIABETES MELLITUS TYPE 2 WITH NEUROLOGICAL MANIFESTATIONS (HCC): ICD-10-CM

## 2018-11-07 DIAGNOSIS — L97.511 ULCER OF TOE OF RIGHT FOOT, LIMITED TO BREAKDOWN OF SKIN (HCC): Primary | ICD-10-CM

## 2018-11-07 DIAGNOSIS — L84 CALLUS OF FOOT: ICD-10-CM

## 2018-11-07 PROCEDURE — 11055 PARING/CUTG B9 HYPRKER LES 1: CPT | Performed by: PODIATRIST

## 2018-11-07 PROCEDURE — 11042 DBRDMT SUBQ TIS 1ST 20SQCM/<: CPT | Performed by: PODIATRIST

## 2018-11-07 PROCEDURE — 99213 OFFICE O/P EST LOW 20 MIN: CPT | Performed by: PODIATRIST

## 2018-11-07 PROCEDURE — 99212 OFFICE O/P EST SF 10 MIN: CPT | Performed by: PODIATRIST

## 2018-11-07 NOTE — PROGRESS NOTES
[facility] Progress Note     Subjective:   Kayleigh Roman is a 77 y.o. male who presents for f/u of wound on right great toe. He has been changing his dressing daily with Fibracol and a band-aid. He has been ambulating in his diabetic boots with offloading pad. He denies any changes to his health. Denies any new questions or concerns. Patient still works occasionally. Denies N/F/V/C. Review of Systems   Constitutional: Negative for chills, fever and malaise/fatigue. Respiratory: Negative for shortness of breath. Cardiovascular: Negative for chest pain. Gastrointestinal: Negative for nausea and vomiting. Objective:     Vitals:    11/07/18 1257   BP: 129/73   Pulse: 85      Lab Results   Component Value Date    LABA1C 8.2 09/05/2018     Physical Exam  Vascular: DP and PT pulses non palpable jeanne; audible on doppler, jeanne. Hair growth absent to the level of the digits, Bilateral.  CFT <5 seconds digits 1,4,5 right. CFT < 5 seconds digits 1,3,4,5 left. Neurological: Light touch sensation diminished to the digits, Bilateral. Fine touch intact 7/9 on the right and 8/10 on the left.       Musculoskeletal: Muscle strength 5/5, Right. S/p Right 2nd and 3rd digit amputation. S/p partial left 2nd digit amputation.        Integument:  Nails thickened,normal length 1,4,5 right and 1, 3-5 left. Ulcer present on plantar medial aspect of right hallux is healed. Hyperkeratotic tissue around medial aspect of right hallux. Wound Measurement:  11/7/18: Healed. 10/24/18: 0.8 x 0.2 x 0.1 cm  10/10/18: 1.4 x 0.4 x 0.1cm  10/03/18: 1.3 x 1.0 x 0.2cm  9/12/18: 1.8 x 1.1x 0.2cm      MEI/PVR b/l LE 12/11/17  MEI: Right: 0.97, Left: 0.97  TBI: R 0.49, L 0.4    Assessment:      Diagnosis Orders   1. Ulcer of toe of right foot, limited to breakdown of skin (HCC)  AR DEBRIDEMENT, SKIN, SUB-Q TISSUE,=<20 SQ CM   2. Callus of foot     3.  Diabetes mellitus type 2 with neurological manifestations ([facility]  AR

## 2018-11-08 ENCOUNTER — TELEPHONE (OUTPATIENT)
Dept: PODIATRY | Age: 68
End: 2018-11-08

## 2018-11-21 ENCOUNTER — OFFICE VISIT (OUTPATIENT)
Dept: PODIATRY | Age: 68
End: 2018-11-21
Payer: COMMERCIAL

## 2018-11-21 VITALS
DIASTOLIC BLOOD PRESSURE: 73 MMHG | HEIGHT: 71 IN | BODY MASS INDEX: 27.89 KG/M2 | HEART RATE: 79 BPM | SYSTOLIC BLOOD PRESSURE: 130 MMHG | WEIGHT: 199.2 LBS

## 2018-11-21 DIAGNOSIS — L84 CALLUS OF FOOT: ICD-10-CM

## 2018-11-21 DIAGNOSIS — L97.511 ULCER OF TOE OF RIGHT FOOT, LIMITED TO BREAKDOWN OF SKIN (HCC): ICD-10-CM

## 2018-11-21 DIAGNOSIS — E11.49 DIABETES MELLITUS TYPE 2 WITH NEUROLOGICAL MANIFESTATIONS (HCC): Primary | ICD-10-CM

## 2018-11-21 PROCEDURE — 99213 OFFICE O/P EST LOW 20 MIN: CPT | Performed by: STUDENT IN AN ORGANIZED HEALTH CARE EDUCATION/TRAINING PROGRAM

## 2018-11-21 PROCEDURE — 99212 OFFICE O/P EST SF 10 MIN: CPT | Performed by: STUDENT IN AN ORGANIZED HEALTH CARE EDUCATION/TRAINING PROGRAM

## 2018-11-21 PROCEDURE — 11055 PARING/CUTG B9 HYPRKER LES 1: CPT | Performed by: STUDENT IN AN ORGANIZED HEALTH CARE EDUCATION/TRAINING PROGRAM

## 2018-11-21 RX ORDER — AMMONIUM LACTATE 12 G/100G
LOTION TOPICAL
Qty: 400 BOTTLE | Refills: 0 | Status: SHIPPED | OUTPATIENT
Start: 2018-11-21 | End: 2018-12-21 | Stop reason: SDUPTHER

## 2018-11-21 NOTE — PROGRESS NOTES
Patient instructed to remove shoes and socks, instructed to sit in exam chair. Current PCP name is  and date of last visit 11/6/18. Do you have a follow up visit scheduled? No  Diabetic visit information    Blood pressure (Control is BP <140/90)  BP Readings from Last 3 Encounters:   11/07/18 129/73   10/24/18 135/76   10/10/18 138/75       BP taken with correct size cuff? - Yes   Repeated if > 140/90 NA      Tobacco use:  Patient  reports that he has never smoked. He has never used smokeless tobacco.  If Smoker - Cessation materials given?- NA       Diabetic Health Maintenance Items due  Diabetes Management   Topic Date Due    Diabetic retinal exam  07/16/1958    Diabetic microalbuminuria test  12/15/2016       Diabetic retinal exam done in last year? - Yes   If No: remind patient that it is due and they should schedule an exam    Medications  Is patient taking any medications for diabetes? -   Yes  Have blood sugars been controlled? Fasting blood sugars under 120   -   Yes   Random home sugars or today's POCT glucose is under 180 -   Yes   []  If No to the above then patient should schedule appt with PCP. Diabetic Plan    A1C Plan  Lab Results   Component Value Date    LABA1C 8.2 09/05/2018    LABA1C 8.2 (H) 12/13/2017    LABA1C 8.0 (H) 08/23/2012      []  If A1C over 8 and last result >3 months ago - Order A1C and refer to PCP   []  If last A1C over 6 months ago - Order A1C and refer to PCP for follow up   []  If elevated blood sugars > 180 - refer to PCP for follow up    []  Blood sugar controlled - A1C under 8 and last check was < 6 months      Cholesterol Plan   Lab Results   Component Value Date    LDLCHOLESTEROL 22 12/14/2017      []  If LDL > 100 and last result >3 months ago - order Fasting lipids and refer to PCP for follow up   []  If LDL < 100 and over 1 year ago - Order Fasting lipids and refer to PCP for follow up   [] LDL is controlled.   LDL < 100 and checked within the last

## 2019-01-03 RX ORDER — AMMONIUM LACTATE 12 G/100G
LOTION TOPICAL
Qty: 400 BOTTLE | Refills: 0 | Status: SHIPPED | OUTPATIENT
Start: 2019-01-03 | End: 2020-01-14

## 2019-01-09 ENCOUNTER — OFFICE VISIT (OUTPATIENT)
Dept: PODIATRY | Age: 69
End: 2019-01-09
Payer: COMMERCIAL

## 2019-01-09 VITALS
WEIGHT: 201 LBS | BODY MASS INDEX: 28.03 KG/M2 | HEART RATE: 72 BPM | DIASTOLIC BLOOD PRESSURE: 71 MMHG | SYSTOLIC BLOOD PRESSURE: 137 MMHG

## 2019-01-09 DIAGNOSIS — I73.9 PAD (PERIPHERAL ARTERY DISEASE) (HCC): ICD-10-CM

## 2019-01-09 DIAGNOSIS — B35.1 ONYCHOMYCOSIS: ICD-10-CM

## 2019-01-09 DIAGNOSIS — L84 CALLUS OF FOOT: ICD-10-CM

## 2019-01-09 DIAGNOSIS — L85.3 DRY SKIN: ICD-10-CM

## 2019-01-09 DIAGNOSIS — E11.49 DIABETES MELLITUS TYPE 2 WITH NEUROLOGICAL MANIFESTATIONS (HCC): Primary | ICD-10-CM

## 2019-01-09 PROCEDURE — 99213 OFFICE O/P EST LOW 20 MIN: CPT | Performed by: STUDENT IN AN ORGANIZED HEALTH CARE EDUCATION/TRAINING PROGRAM

## 2019-01-09 PROCEDURE — 11056 PARNG/CUTG B9 HYPRKR LES 2-4: CPT | Performed by: STUDENT IN AN ORGANIZED HEALTH CARE EDUCATION/TRAINING PROGRAM

## 2019-01-09 PROCEDURE — 11721 DEBRIDE NAIL 6 OR MORE: CPT | Performed by: STUDENT IN AN ORGANIZED HEALTH CARE EDUCATION/TRAINING PROGRAM

## 2019-01-09 PROCEDURE — 99212 OFFICE O/P EST SF 10 MIN: CPT | Performed by: STUDENT IN AN ORGANIZED HEALTH CARE EDUCATION/TRAINING PROGRAM

## 2019-01-09 PROCEDURE — 11055 PARING/CUTG B9 HYPRKER LES 1: CPT | Performed by: STUDENT IN AN ORGANIZED HEALTH CARE EDUCATION/TRAINING PROGRAM

## 2019-02-20 ENCOUNTER — HOSPITAL ENCOUNTER (OUTPATIENT)
Age: 69
Discharge: HOME | End: 2019-02-20
Payer: COMMERCIAL

## 2019-02-20 LAB
ABSOLUTE EOS #: 0.46 K/UL (ref 0–0.44)
ABSOLUTE IMMATURE GRANULOCYTE: 0.04 K/UL (ref 0–0.3)
ABSOLUTE LYMPH #: 1.4 K/UL (ref 1.1–3.7)
ABSOLUTE MONO #: 0.48 K/UL (ref 0.1–1.2)
ALBUMIN SERPL-MCNC: 4.5 G/DL (ref 3.5–5.2)
ALBUMIN/GLOBULIN RATIO: 1.2 (ref 1–2.5)
ALP BLD-CCNC: 76 U/L (ref 40–129)
ALT SERPL-CCNC: 26 U/L (ref 5–41)
ANION GAP SERPL CALCULATED.3IONS-SCNC: 15 MMOL/L (ref 9–17)
AST SERPL-CCNC: 21 U/L
BASOPHILS # BLD: 1 % (ref 0–2)
BASOPHILS ABSOLUTE: 0.05 K/UL (ref 0–0.2)
BILIRUB SERPL-MCNC: 0.19 MG/DL (ref 0.3–1.2)
BUN BLDV-MCNC: 16 MG/DL (ref 8–23)
BUN/CREAT BLD: ABNORMAL (ref 9–20)
CALCIUM SERPL-MCNC: 10 MG/DL (ref 8.6–10.4)
CHLORIDE BLD-SCNC: 99 MMOL/L (ref 98–107)
CHOLESTEROL, FASTING: 114 MG/DL
CHOLESTEROL/HDL RATIO: 2.4
CO2: 23 MMOL/L (ref 20–31)
CREAT SERPL-MCNC: 1.13 MG/DL (ref 0.7–1.2)
CREATININE URINE: 184.3 MG/DL (ref 39–259)
DIFFERENTIAL TYPE: ABNORMAL
EOSINOPHILS RELATIVE PERCENT: 6 % (ref 1–4)
GFR AFRICAN AMERICAN: >60 ML/MIN
GFR NON-AFRICAN AMERICAN: >60 ML/MIN
GFR SERPL CREATININE-BSD FRML MDRD: ABNORMAL ML/MIN/{1.73_M2}
GFR SERPL CREATININE-BSD FRML MDRD: ABNORMAL ML/MIN/{1.73_M2}
GLUCOSE BLD-MCNC: 193 MG/DL (ref 70–99)
HCT VFR BLD CALC: 43.4 % (ref 40.7–50.3)
HDLC SERPL-MCNC: 47 MG/DL
HEMOGLOBIN: 13.6 G/DL (ref 13–17)
IMMATURE GRANULOCYTES: 1 %
LDL CHOLESTEROL: 52 MG/DL (ref 0–130)
LYMPHOCYTES # BLD: 19 % (ref 24–43)
MCH RBC QN AUTO: 26 PG (ref 25.2–33.5)
MCHC RBC AUTO-ENTMCNC: 31.3 G/DL (ref 28.4–34.8)
MCV RBC AUTO: 83 FL (ref 82.6–102.9)
MICROALBUMIN/CREAT 24H UR: 70 MG/L
MICROALBUMIN/CREAT UR-RTO: 38 MCG/MG CREAT
MONOCYTES # BLD: 7 % (ref 3–12)
NRBC AUTOMATED: 0 PER 100 WBC
PDW BLD-RTO: 15.2 % (ref 11.8–14.4)
PLATELET # BLD: 346 K/UL (ref 138–453)
PLATELET ESTIMATE: ABNORMAL
PMV BLD AUTO: 10.2 FL (ref 8.1–13.5)
POTASSIUM SERPL-SCNC: 4.3 MMOL/L (ref 3.7–5.3)
RBC # BLD: 5.23 M/UL (ref 4.21–5.77)
RBC # BLD: ABNORMAL 10*6/UL
SEG NEUTROPHILS: 66 % (ref 36–65)
SEGMENTED NEUTROPHILS ABSOLUTE COUNT: 4.81 K/UL (ref 1.5–8.1)
SODIUM BLD-SCNC: 137 MMOL/L (ref 135–144)
TOTAL PROTEIN: 8.2 G/DL (ref 6.4–8.3)
TRIGLYCERIDE, FASTING: 77 MG/DL
VLDLC SERPL CALC-MCNC: NORMAL MG/DL (ref 1–30)
WBC # BLD: 7.2 K/UL (ref 3.5–11.3)
WBC # BLD: ABNORMAL 10*3/UL

## 2019-02-20 PROCEDURE — 80053 COMPREHEN METABOLIC PANEL: CPT

## 2019-02-20 PROCEDURE — 36415 COLL VENOUS BLD VENIPUNCTURE: CPT

## 2019-02-20 PROCEDURE — 82043 UR ALBUMIN QUANTITATIVE: CPT

## 2019-02-20 PROCEDURE — 80061 LIPID PANEL: CPT

## 2019-02-20 PROCEDURE — 82570 ASSAY OF URINE CREATININE: CPT

## 2019-02-20 PROCEDURE — 85025 COMPLETE CBC W/AUTO DIFF WBC: CPT

## 2019-04-10 ENCOUNTER — OFFICE VISIT (OUTPATIENT)
Dept: PODIATRY | Age: 69
End: 2019-04-10
Payer: COMMERCIAL

## 2019-04-10 VITALS
SYSTOLIC BLOOD PRESSURE: 134 MMHG | DIASTOLIC BLOOD PRESSURE: 68 MMHG | HEIGHT: 71 IN | HEART RATE: 76 BPM | WEIGHT: 205.2 LBS | BODY MASS INDEX: 28.73 KG/M2

## 2019-04-10 DIAGNOSIS — M79.672 PAIN IN BOTH FEET: Primary | ICD-10-CM

## 2019-04-10 DIAGNOSIS — Z79.4 DIABETES MELLITUS TYPE 2, INSULIN DEPENDENT (HCC): ICD-10-CM

## 2019-04-10 DIAGNOSIS — B35.1 ONYCHOMYCOSIS: ICD-10-CM

## 2019-04-10 DIAGNOSIS — M79.671 PAIN IN BOTH FEET: Primary | ICD-10-CM

## 2019-04-10 DIAGNOSIS — I73.9 PVD (PERIPHERAL VASCULAR DISEASE) (HCC): ICD-10-CM

## 2019-04-10 DIAGNOSIS — L84 CALLUS: ICD-10-CM

## 2019-04-10 DIAGNOSIS — Z89.421 STATUS POST AMPUTATION OF LESSER TOE, RIGHT (HCC): ICD-10-CM

## 2019-04-10 DIAGNOSIS — E11.9 DIABETES MELLITUS TYPE 2, INSULIN DEPENDENT (HCC): ICD-10-CM

## 2019-04-10 PROCEDURE — 99213 OFFICE O/P EST LOW 20 MIN: CPT | Performed by: STUDENT IN AN ORGANIZED HEALTH CARE EDUCATION/TRAINING PROGRAM

## 2019-04-10 PROCEDURE — 11721 DEBRIDE NAIL 6 OR MORE: CPT | Performed by: STUDENT IN AN ORGANIZED HEALTH CARE EDUCATION/TRAINING PROGRAM

## 2019-04-10 PROCEDURE — 11055 PARING/CUTG B9 HYPRKER LES 1: CPT | Performed by: STUDENT IN AN ORGANIZED HEALTH CARE EDUCATION/TRAINING PROGRAM

## 2019-04-10 NOTE — PROGRESS NOTES
Patient instructed to remove shoes and socks, instructed to sit in exam chair. Current PCP name is Dr. Margarito Pinzon and date of last visit Feb 2019. Do you have a follow up visit scheduled?   Yes or no    If yes the date is 5/2019

## 2019-04-10 NOTE — PROGRESS NOTES
[facility] Progress Note     Subjective:   Wing Conrad is a 77 y.o. male who presents for f/u of wound on right great toe. Patient states the wound has been healed for the past couple months. He states he has been putting lotion on his feet. States dm shoes are over 3years old. Denies N/F/V/C/new sob/cp. Review of Systems   Constitutional: Negative for chills, fever and malaise/fatigue. Respiratory: Negative for shortness of breath. Cardiovascular: Negative for chest pain. Gastrointestinal: Negative for nausea and vomiting. Objective:     Vitals:    04/10/19 1307   BP: 134/68   Pulse: 76      Lab Results   Component Value Date    LABA1C 8.2 09/05/2018     Physical Exam  Vascular: DP and PT pulses non palpable jeanne; audible on doppler, jeanne. Hair growth absent to the level of the digits, Bilateral.  CFT <5 seconds digits 1,4,5 right. CFT < 5 seconds digits 1,3,4,5 left. Neurological: Light touch sensation diminished to the digits, Bilateral. Fine touch intact 6/8 on the right and 8/9 on the left.       Musculoskeletal: Muscle strength 5/5, Right. S/p Right 2nd and 3rd digit amputation. S/p partial left 2nd digit amputation.        Integument:  Nails thickened,normal length 1,4,5 right and 1, 3-5 left. Pre-ulcerative lesion noted to medial right hallux. Hyperkeratotic tissue around medial aspect of right hallux. No fluctuance or erythema or open wounds noted. No drainage    Wound Measurement:  1/9/19: epithelized  11/21/18: epithelized   11/7/18: Healed. 10/24/18: 0.8 x 0.2 x 0.1 cm  10/10/18: 1.4 x 0.4 x 0.1cm  10/03/18: 1.3 x 1.0 x 0.2cm  9/12/18: 1.8 x 1.1x 0.2cm      MEI/PVR b/l LE 12/11/17  MEI: Right: 0.97, Left: 0.97  TBI: R 0.49, L 0.4    Assessment:      Diagnosis Orders   1. Pain in both feet  81054 - DE DEBRIDEMENT OF NAILS, 6 OR MORE   2.  Diabetes mellitus type 2, insulin dependent (HCC)  DE TRIM HYPERKERATOTIC SKIN LESION, ONE    04631 - DE DEBRIDEMENT OF NAILS, 6 OR MORE   3. Callus  MS TRIM HYPERKERATOTIC SKIN LESION, ONE    75422 - MS DEBRIDEMENT OF NAILS, 6 OR MORE   4. Status post amputation of lesser toe, right (HCC)  50306 - MS DEBRIDEMENT OF NAILS, 6 OR MORE   5. Onychomycosis  14728 - MS DEBRIDEMENT OF NAILS, 6 OR MORE   6. PVD (peripheral vascular disease) ([address]  MS TRIM HYPERKERATOTIC SKIN LESION, ONE         Plan:   · Pt seen and examined   · Treatment options discussed with pt and attending  · Debridement of hyperkeratotic tissue down to the layer of healthy epidermis around previous wound site with a #15 blade without incident. No hemostasis or anesthesia required.  0/10 post debridement  · Trimmed nails 1,4,5 on the right and 1-5 on the left using nail nippers without incident  · Instructed pt on the importance of tight glycemic control and the daily foot inspections  · rx dm shoes and inserts  · Educated pt on signs of infection and instructed to goto Ed if arise  · RTC in 1 month   · Case discussed with Dr. Cindy Scott    Electronically signed by Adam Rivas DPM on 4/10/2019 at 1:37 PM

## 2019-04-15 NOTE — PROGRESS NOTES
The patients PCP is Dr Marycarmen White. His last office visit was on 2/7/2019 and his next office visit is scheduled for 5/13/2019.     Electronically signed by Isabel Worthy MA on 4/15/2019 at 11:10 AM

## 2019-05-15 ENCOUNTER — OFFICE VISIT (OUTPATIENT)
Dept: PODIATRY | Age: 69
End: 2019-05-15
Payer: COMMERCIAL

## 2019-05-15 VITALS
BODY MASS INDEX: 26.5 KG/M2 | DIASTOLIC BLOOD PRESSURE: 71 MMHG | WEIGHT: 190 LBS | HEART RATE: 66 BPM | SYSTOLIC BLOOD PRESSURE: 140 MMHG

## 2019-05-15 DIAGNOSIS — Z79.4 DIABETES MELLITUS TYPE 2, INSULIN DEPENDENT (HCC): Primary | ICD-10-CM

## 2019-05-15 DIAGNOSIS — I73.9 PVD (PERIPHERAL VASCULAR DISEASE) (HCC): ICD-10-CM

## 2019-05-15 DIAGNOSIS — B35.1 ONYCHOMYCOSIS: ICD-10-CM

## 2019-05-15 DIAGNOSIS — M79.672 PAIN IN BOTH FEET: ICD-10-CM

## 2019-05-15 DIAGNOSIS — Z89.421 STATUS POST AMPUTATION OF LESSER TOE, RIGHT (HCC): ICD-10-CM

## 2019-05-15 DIAGNOSIS — M79.671 PAIN IN BOTH FEET: ICD-10-CM

## 2019-05-15 DIAGNOSIS — E11.9 DIABETES MELLITUS TYPE 2, INSULIN DEPENDENT (HCC): Primary | ICD-10-CM

## 2019-05-15 DIAGNOSIS — L84 CALLUS: ICD-10-CM

## 2019-05-15 PROCEDURE — 11056 PARNG/CUTG B9 HYPRKR LES 2-4: CPT | Performed by: STUDENT IN AN ORGANIZED HEALTH CARE EDUCATION/TRAINING PROGRAM

## 2019-05-15 PROCEDURE — 99212 OFFICE O/P EST SF 10 MIN: CPT | Performed by: STUDENT IN AN ORGANIZED HEALTH CARE EDUCATION/TRAINING PROGRAM

## 2019-05-15 NOTE — PROGRESS NOTES
[facility] Progress Note     Subjective:   Elizabeth Guevaar is a 77 y.o. male who presents for follow up pre-ulcerative lesions to bilateral feet. Patient has had previous amputations of toes on the right foot. Patient admits he has calluses to both feet. Admits he has been using cream every day to feet. No new sores to feet. Admits to chronic neuropathy of both feet. Admits he got his new diabetic shoes and inserts. Denies N/F/V/C/new sob/cp. Review of Systems   Constitutional: Negative for chills, fever and malaise/fatigue. Respiratory: Negative for shortness of breath. Cardiovascular: Negative for chest pain. Gastrointestinal: Negative for nausea and vomiting. Objective:     Vitals:    05/15/19 1244   BP: (!) 140/71   Pulse: 66      Lab Results   Component Value Date    LABA1C 8.2 09/05/2018     Physical Exam  Vascular: DP and PT pulses non palpable jeanne; audible on doppler, jeanne. Hair growth absent to the level of the digits, Bilateral.  CFT <5 seconds digits 1,4,5 right. CFT < 5 seconds digits 1,3,4,5 left. Neurological: Light touch sensation diminished to the digits, Bilateral. Fine touch intact 6/8 on the right and 8/9 on the left.       Musculoskeletal: Muscle strength 5/5, Right. S/p Right 2nd and 3rd digit amputation. S/p partial left 2nd digit amputation.        Integument:  Nails are WNL length 1,4,5 right and 1, 3-5 left. Pre-ulcerative lesion noted to medial right hallux, bilateral 5th mtpj. No fluctuance or erythema or open wounds noted. No drainage noted. MEI/PVR b/l LE 12/11/17  MEI: Right: 0.97, Left: 0.97  TBI: R 0.49, L 0.4    Assessment:      Diagnosis Orders   1. Diabetes mellitus type 2, insulin dependent ([address]  TRIM BENIGN HYPERKERATOTIC SKIN LESION,2-4   2. Pain in both feet  TRIM BENIGN HYPERKERATOTIC SKIN LESION,2-4   3. Callus  TRIM BENIGN HYPERKERATOTIC SKIN LESION,2-4   4. Status post amputation of lesser toe, right ([address]     5. Onychomycosis     6.  PVD (peripheral vascular disease) ([facility]           Plan:   · Pt seen and examined   · Treatment options discussed with pt   · Debridement of all hyperkeratotic tissues down to the layer of healthy epidermis around previous wound site with a #15 blade without incident. No hemostasis or anesthesia required.  0/10 post debridement  · Instructed pt on the importance of tight glycemic control and the daily foot inspections  · Educated pt on signs of infection and instructed to goto Ed if arise  · Continue using lotion daily   · RTC in 3 month   · Case discussed with Dr. Bahena Said    Electronically signed by Maxi Archibald DPM on 5/15/2019 at 1:12 PM

## 2019-08-02 ENCOUNTER — HOSPITAL ENCOUNTER (OUTPATIENT)
Age: 69
Discharge: HOME | End: 2019-08-02
Payer: COMMERCIAL

## 2019-08-02 LAB
CREATININE URINE: 78.3 MG/DL (ref 39–259)
MICROALBUMIN/CREAT 24H UR: 50 MG/L
MICROALBUMIN/CREAT UR-RTO: 64 MCG/MG CREAT

## 2019-08-02 PROCEDURE — 82043 UR ALBUMIN QUANTITATIVE: CPT

## 2019-08-02 PROCEDURE — 82570 ASSAY OF URINE CREATININE: CPT

## 2019-09-04 ENCOUNTER — OFFICE VISIT (OUTPATIENT)
Dept: PODIATRY | Age: 69
End: 2019-09-04
Payer: COMMERCIAL

## 2019-09-04 VITALS
HEIGHT: 71 IN | HEART RATE: 75 BPM | DIASTOLIC BLOOD PRESSURE: 71 MMHG | BODY MASS INDEX: 29.12 KG/M2 | WEIGHT: 208 LBS | SYSTOLIC BLOOD PRESSURE: 134 MMHG

## 2019-09-04 DIAGNOSIS — B35.1 ONYCHOMYCOSIS: ICD-10-CM

## 2019-09-04 DIAGNOSIS — I73.9 PVD (PERIPHERAL VASCULAR DISEASE) (HCC): ICD-10-CM

## 2019-09-04 DIAGNOSIS — Z89.421 STATUS POST AMPUTATION OF LESSER TOE, RIGHT (HCC): ICD-10-CM

## 2019-09-04 DIAGNOSIS — E11.9 DIABETES MELLITUS TYPE 2, INSULIN DEPENDENT (HCC): Primary | ICD-10-CM

## 2019-09-04 DIAGNOSIS — L84 CALLUS OF FOOT: ICD-10-CM

## 2019-09-04 DIAGNOSIS — Z79.4 DIABETES MELLITUS TYPE 2, INSULIN DEPENDENT (HCC): Primary | ICD-10-CM

## 2019-09-04 LAB — HBA1C MFR BLD: 8.5 %

## 2019-09-04 PROCEDURE — 11721 DEBRIDE NAIL 6 OR MORE: CPT | Performed by: STUDENT IN AN ORGANIZED HEALTH CARE EDUCATION/TRAINING PROGRAM

## 2019-09-04 PROCEDURE — 99212 OFFICE O/P EST SF 10 MIN: CPT | Performed by: STUDENT IN AN ORGANIZED HEALTH CARE EDUCATION/TRAINING PROGRAM

## 2019-09-04 PROCEDURE — 83036 HEMOGLOBIN GLYCOSYLATED A1C: CPT | Performed by: STUDENT IN AN ORGANIZED HEALTH CARE EDUCATION/TRAINING PROGRAM

## 2019-09-04 PROCEDURE — 99213 OFFICE O/P EST LOW 20 MIN: CPT | Performed by: STUDENT IN AN ORGANIZED HEALTH CARE EDUCATION/TRAINING PROGRAM

## 2019-09-04 PROCEDURE — 11056 PARNG/CUTG B9 HYPRKR LES 2-4: CPT | Performed by: STUDENT IN AN ORGANIZED HEALTH CARE EDUCATION/TRAINING PROGRAM

## 2019-09-04 NOTE — PROGRESS NOTES
Patient instructed to remove shoes and socks, instructed to sit in exam chair. Current PCP name is porsha and date of last visit 8/31/19 . Do you have a follow up visit scheduled? no             Diabetic visit information    Blood pressure (Control is BP <140/90)  BP Readings from Last 3 Encounters:   05/15/19 (!) 140/71   04/10/19 134/68   01/09/19 137/71       BP taken with correct size cuff? - Yes   Repeated if > 140/90 NA      Tobacco use:  Patient  reports that he has never smoked. He has never used smokeless tobacco.  If Smoker - Cessation materials given?- NA       Diabetic Health Maintenance Items due  Diabetes Management   Topic Date Due    Diabetic retinal exam  07/16/1958    Diabetic foot exam  12/13/2018    A1C test (Diabetic or Prediabetic)  09/05/2019       Diabetic retinal exam done in last year? - Yes   If No: remind patient that it is due and they should schedule an exam    Medications  Is patient taking any medications for diabetes? -   Yes  Have blood sugars been controlled? Fasting blood sugars under 120   -   Yes   Random home sugars or today's POCT glucose is under 180 -   Yes   []  If No to the above then patient should schedule appt with PCP.      Diabetic Plan    A1C Plan  Lab Results   Component Value Date    LABA1C 8.2 09/05/2018    LABA1C 8.2 (H) 12/13/2017    LABA1C 8.0 (H) 08/23/2012      []  If A1C over 8 and last result >3 months ago - Order A1C and refer to PCP   []  If last A1C over 6 months ago - Order A1C and refer to PCP for follow up   []  If elevated blood sugars > 180 - refer to PCP for follow up    []  Blood sugar controlled - A1C under 8 and last check was < 6 months      Cholesterol Plan   Lab Results   Component Value Date    LDLCHOLESTEROL 52 02/20/2019      []  If LDL > 100 and last result >3 months ago - order Fasting lipids and refer to PCP for follow up   []  If LDL < 100 and over 1 year ago - Order Fasting lipids and refer to PCP for follow up   [] LDL is

## 2019-10-03 ENCOUNTER — HOSPITAL ENCOUNTER (OUTPATIENT)
Dept: VASCULAR LAB | Age: 69
Discharge: HOME | End: 2019-10-03
Payer: COMMERCIAL

## 2019-10-03 DIAGNOSIS — I73.9 PVD (PERIPHERAL VASCULAR DISEASE) (HCC): ICD-10-CM

## 2019-10-03 PROCEDURE — 93923 UPR/LXTR ART STDY 3+ LVLS: CPT

## 2019-12-04 ENCOUNTER — OFFICE VISIT (OUTPATIENT)
Dept: PODIATRY | Age: 69
End: 2019-12-04
Payer: COMMERCIAL

## 2019-12-04 VITALS
BODY MASS INDEX: 28.7 KG/M2 | HEIGHT: 71 IN | WEIGHT: 205 LBS | SYSTOLIC BLOOD PRESSURE: 136 MMHG | HEART RATE: 74 BPM | DIASTOLIC BLOOD PRESSURE: 72 MMHG

## 2019-12-04 DIAGNOSIS — E11.9 DIABETES MELLITUS TYPE 2, INSULIN DEPENDENT (HCC): ICD-10-CM

## 2019-12-04 DIAGNOSIS — Z79.4 DIABETES MELLITUS TYPE 2, INSULIN DEPENDENT (HCC): ICD-10-CM

## 2019-12-04 DIAGNOSIS — L84 CALLUS OF FOOT: ICD-10-CM

## 2019-12-04 DIAGNOSIS — Z89.421 STATUS POST AMPUTATION OF LESSER TOE, RIGHT (HCC): ICD-10-CM

## 2019-12-04 DIAGNOSIS — I73.9 PVD (PERIPHERAL VASCULAR DISEASE) (HCC): ICD-10-CM

## 2019-12-04 DIAGNOSIS — L30.9 DERMATITIS: Primary | ICD-10-CM

## 2019-12-04 DIAGNOSIS — B35.1 ONYCHOMYCOSIS: ICD-10-CM

## 2019-12-04 PROCEDURE — 11102 TANGNTL BX SKIN SINGLE LES: CPT | Performed by: STUDENT IN AN ORGANIZED HEALTH CARE EDUCATION/TRAINING PROGRAM

## 2019-12-04 PROCEDURE — 11721 DEBRIDE NAIL 6 OR MORE: CPT | Performed by: STUDENT IN AN ORGANIZED HEALTH CARE EDUCATION/TRAINING PROGRAM

## 2019-12-04 PROCEDURE — 99213 OFFICE O/P EST LOW 20 MIN: CPT | Performed by: STUDENT IN AN ORGANIZED HEALTH CARE EDUCATION/TRAINING PROGRAM

## 2019-12-04 PROCEDURE — 11055 PARING/CUTG B9 HYPRKER LES 1: CPT | Performed by: STUDENT IN AN ORGANIZED HEALTH CARE EDUCATION/TRAINING PROGRAM

## 2019-12-04 PROCEDURE — 99212 OFFICE O/P EST SF 10 MIN: CPT | Performed by: STUDENT IN AN ORGANIZED HEALTH CARE EDUCATION/TRAINING PROGRAM

## 2019-12-04 RX ORDER — SITAGLIPTIN 100 MG/1
TABLET, FILM COATED ORAL
Refills: 2 | COMMUNITY
Start: 2019-11-27

## 2019-12-04 RX ORDER — OXYBUTYNIN CHLORIDE 10 MG/1
TABLET, EXTENDED RELEASE ORAL
Refills: 3 | COMMUNITY
Start: 2019-12-02

## 2019-12-18 ENCOUNTER — OFFICE VISIT (OUTPATIENT)
Dept: PODIATRY | Age: 69
End: 2019-12-18
Payer: COMMERCIAL

## 2019-12-18 VITALS
HEART RATE: 81 BPM | HEIGHT: 71 IN | DIASTOLIC BLOOD PRESSURE: 65 MMHG | WEIGHT: 207 LBS | SYSTOLIC BLOOD PRESSURE: 133 MMHG | BODY MASS INDEX: 28.98 KG/M2

## 2019-12-18 DIAGNOSIS — L30.9 DERMATITIS: Primary | ICD-10-CM

## 2019-12-18 DIAGNOSIS — Z79.4 DIABETES MELLITUS TYPE 2, INSULIN DEPENDENT (HCC): ICD-10-CM

## 2019-12-18 DIAGNOSIS — E11.9 DIABETES MELLITUS TYPE 2, INSULIN DEPENDENT (HCC): ICD-10-CM

## 2019-12-18 PROCEDURE — 99213 OFFICE O/P EST LOW 20 MIN: CPT | Performed by: STUDENT IN AN ORGANIZED HEALTH CARE EDUCATION/TRAINING PROGRAM

## 2019-12-18 PROCEDURE — 99212 OFFICE O/P EST SF 10 MIN: CPT | Performed by: STUDENT IN AN ORGANIZED HEALTH CARE EDUCATION/TRAINING PROGRAM

## 2019-12-18 RX ORDER — GLIPIZIDE 10 MG/1
TABLET ORAL
Refills: 3 | COMMUNITY
Start: 2019-12-09

## 2019-12-18 RX ORDER — PEN NEEDLE, DIABETIC 32GX 5/32"
NEEDLE, DISPOSABLE MISCELLANEOUS
Refills: 3 | COMMUNITY
Start: 2019-12-09

## 2019-12-18 RX ORDER — CETIRIZINE HYDROCHLORIDE 10 MG/1
TABLET ORAL
Refills: 2 | COMMUNITY
Start: 2019-12-09

## 2020-01-14 ENCOUNTER — OFFICE VISIT (OUTPATIENT)
Dept: DERMATOLOGY | Age: 70
End: 2020-01-14
Payer: MEDICARE

## 2020-01-14 VITALS
SYSTOLIC BLOOD PRESSURE: 150 MMHG | BODY MASS INDEX: 29.01 KG/M2 | OXYGEN SATURATION: 96 % | WEIGHT: 207.2 LBS | DIASTOLIC BLOOD PRESSURE: 75 MMHG | HEIGHT: 71 IN | HEART RATE: 73 BPM

## 2020-01-14 PROCEDURE — 99202 OFFICE O/P NEW SF 15 MIN: CPT | Performed by: DERMATOLOGY

## 2020-01-14 RX ORDER — TRIAMCINOLONE ACETONIDE 1 MG/G
CREAM TOPICAL
Qty: 45 G | Refills: 3 | Status: SHIPPED | OUTPATIENT
Start: 2020-01-14 | End: 2020-03-12 | Stop reason: SDUPTHER

## 2020-01-14 RX ORDER — AMMONIUM LACTATE 12 G/100G
LOTION TOPICAL
Qty: 57 G | Refills: 3 | Status: SHIPPED | OUTPATIENT
Start: 2020-01-14 | End: 2020-03-12 | Stop reason: SDUPTHER

## 2020-02-26 ENCOUNTER — OFFICE VISIT (OUTPATIENT)
Dept: PODIATRY | Age: 70
End: 2020-02-26
Payer: MEDICARE

## 2020-02-26 VITALS
DIASTOLIC BLOOD PRESSURE: 72 MMHG | HEIGHT: 71 IN | WEIGHT: 201 LBS | HEART RATE: 102 BPM | BODY MASS INDEX: 28.14 KG/M2 | SYSTOLIC BLOOD PRESSURE: 117 MMHG

## 2020-02-26 PROCEDURE — 99212 OFFICE O/P EST SF 10 MIN: CPT | Performed by: STUDENT IN AN ORGANIZED HEALTH CARE EDUCATION/TRAINING PROGRAM

## 2020-02-26 PROCEDURE — 99213 OFFICE O/P EST LOW 20 MIN: CPT | Performed by: STUDENT IN AN ORGANIZED HEALTH CARE EDUCATION/TRAINING PROGRAM

## 2020-02-26 PROCEDURE — 11721 DEBRIDE NAIL 6 OR MORE: CPT | Performed by: STUDENT IN AN ORGANIZED HEALTH CARE EDUCATION/TRAINING PROGRAM

## 2020-02-26 PROCEDURE — 11056 PARNG/CUTG B9 HYPRKR LES 2-4: CPT | Performed by: STUDENT IN AN ORGANIZED HEALTH CARE EDUCATION/TRAINING PROGRAM

## 2020-02-26 NOTE — PROGRESS NOTES
Patient instructed to remove shoes and socks, instructed to sit in exam chair. Current PCP name is Apurva Dietrich and date of last visit 8-31-19. Do you have a follow up visit scheduled?   Yes or no    If yes the date is NO

## 2020-02-26 NOTE — PROGRESS NOTES
Skin: Denies open wounds. Otherwise negative except as noted in the HPI. PMH:  Past Medical History:   Diagnosis Date    Diabetic ulcer of toe ([address]     Gangrene ([address]     Hypertension     MRSA (methicillin resistant staph aureus) culture positive 07/01/2016    foot    Type II or unspecified type diabetes mellitus without mention of complication, not stated as uncontrolled        Surgical History:   Past Surgical History:   Procedure Laterality Date    CHEST SURGERY      BULLET NEAR AORTA/ NOT SAFE FOR MRI PER RADIOLOGIST DR. Michael Valdez    CA AMPUTATION TOE,I-P JT Left 6/14/2018    TOE AMPUTATION PARTIAL LEFT 2ND performed by Gianna López DPM at [address] [place] [address] N/A 12/19/2017    DEBRIDMENT AND DELAYED PRIMARY CLOSURE RIGHT FOOT WOUND performed by Angela Caceres DPM at [place] [address] Right 12/16/2017    RIGHT 2ND AND 3RD DIGITS TOE AMPUTATION FOR WET GANGRENE performed by Angela Caceres DPM at [address] [place] [address] TOE AMPUTATION Left 06/14/2018       Social History:  Social History     Tobacco Use    Smoking status: Never Smoker    Smokeless tobacco: Never Used   Substance Use Topics    Alcohol use: No     Alcohol/week: 0.0 standard drinks    Drug use: No       Medications:  Prior to Admission medications    Medication Sig Start Date End Date Taking? Authorizing Provider   ammonium lactate (LAC-HYDRIN) 12 % lotion Apply twice daily to dry skin 1/14/20  Yes Tammy Medina MD   triamcinolone (KENALOG) 0.1 % cream Apply to red irritated rash once daily (not face, armpit or groin) 1/14/20  Yes Tammy Medina MD   cetirizine (ZYRTEC) 10 MG tablet  12/9/19  Yes Historical Provider, MD   glipiZIDE (GLUCOTROL) 10 MG tablet  12/9/19  Yes Historical Provider, MD   Alcohol Swabs (PRO COMFORT ALCOHOL) 70 % PADS  12/9/19  Yes Historical Provider, MD   hydrocortisone 2.5 % cream Apply topically 2 times daily.  12/18/19  Yes Deejay Armijo DPM   JANUVIA 100 MG tablet maceration of WS, b/l. Plantar skin is dry bilateral.    Large diffuse hyperkeratotic plaque located to R medial leg with no signs of infection. 10/3/19 LE NIVS:  MEI R 0.95, L 0.94  TBI R 0.81, L 0.72    Assessment   Denilson Levy is a 68 y.o. male with     Diagnosis Orders   1. Dermatitis     2. Diabetes mellitus type 2, insulin dependent ([address]  12278 - [place] DEBRIDEMENT OF NAILS, 6 OR MORE    TRIM BENIGN HYPERKERATOTIC SKIN LESION,2-4   3. Status post amputation of lesser toe, right ([address]  74959 - KS DEBRIDEMENT OF NAILS, 6 OR MORE    TRIM BENIGN HYPERKERATOTIC SKIN LESION,2-4   4. Onychomycosis  94701 - KS DEBRIDEMENT OF NAILS, 6 OR MORE   5. PVD (peripheral vascular disease) ([address]  30276 - KS DEBRIDEMENT OF NAILS, 6 OR MORE    TRIM BENIGN HYPERKERATOTIC SKIN LESION,2-4   6. Callus of foot  TRIM BENIGN HYPERKERATOTIC SKIN LESION,2-4   7. Pain in both feet  05021 - KS DEBRIDEMENT OF NAILS, 6 OR MORE    TRIM BENIGN HYPERKERATOTIC SKIN LESION,2-4     Plan   · Patient examined and evaluated  · Diagnosis and treatment options discussed in detail  · Continue Lac-Hydrin daily, warm washcloth cleansing and Triamcinolone BID to RLE hyperkeratosis per Dermatology   · Nails 1/3/4/5 on L foot and 1/4/5 on R foot sharply debridement w/ sterile nipper w/o incident - (Q7 h/o amputation) - Q8 findings  · HKT x2 sharply debrided w/ #15 blade w/o incident  - Q8 findings  · Educated patient on importance of daily foot checks/hygiene, monitor for SOI, ambulation in diabetic shoes, no barefoot ambulation and tight glycemic control  · Please, call the office with any questions or concerns   · RTC in 3 months for routine DM foot care or sooner if issues arise  · Discussed w/ Dr. Renu Garland    No orders of the defined types were placed in this encounter.     Orders Placed This Encounter   Procedures    15001 - [place] DEBRIDEMENT OF NAILS, 6 OR MORE    TRIM BENIGN HYPERKERATOTIC SKIN [facility] , DPM   Podiatric Medicine & Surgery   2/26/2020 at 1:14 PM

## 2020-03-12 ENCOUNTER — OFFICE VISIT (OUTPATIENT)
Dept: DERMATOLOGY | Age: 70
End: 2020-03-12
Payer: COMMERCIAL

## 2020-03-12 VITALS
HEART RATE: 83 BPM | HEIGHT: 71 IN | WEIGHT: 200 LBS | BODY MASS INDEX: 28 KG/M2 | SYSTOLIC BLOOD PRESSURE: 164 MMHG | OXYGEN SATURATION: 99 % | DIASTOLIC BLOOD PRESSURE: 83 MMHG

## 2020-03-12 PROCEDURE — 99213 OFFICE O/P EST LOW 20 MIN: CPT | Performed by: DERMATOLOGY

## 2020-03-12 RX ORDER — AMMONIUM LACTATE 12 G/100G
LOTION TOPICAL
Qty: 57 G | Refills: 3 | Status: SHIPPED | OUTPATIENT
Start: 2020-03-12

## 2020-03-12 RX ORDER — TRIAMCINOLONE ACETONIDE 1 MG/G
CREAM TOPICAL
Qty: 45 G | Refills: 3 | Status: SHIPPED | OUTPATIENT
Start: 2020-03-12

## 2020-03-12 NOTE — PATIENT INSTRUCTIONS
-Continue the Triamcinolone Medicated for itching. Use if its itchy, red, and raised.   -Continue the Am-Lactin (helps break down the dry skin)  -If it starts to bother you call the office.  [phone]

## 2020-03-12 NOTE — PROGRESS NOTES
armpit or groin)  Dispense: 45 g; Refill: 3  - ammonium lactate (LAC-HYDRIN) 12 % lotion; Apply twice daily to dry skin  Dispense: 57 g; Refill: 3    RTC prn            Patient Instructions   -Continue the Triamcinolone Medicated for itching. Use if its itchy, red, and raised.   -Continue the Am-Lactin (helps break down the dry skin)  -If it starts to bother you call the office. [phone]      Follow-up: No follow-ups on file. This note was created with the assistance of a speech-recognition program.  Although the intention is to generate a document that actually reflects the content of the visit, no guarantees can be provided that every mistake has been identified and corrected byediting.     Electronically signed by Hussein Gates MD on 3/12/20 at 2:58 PM EDT

## 2020-05-27 ENCOUNTER — OFFICE VISIT (OUTPATIENT)
Dept: PODIATRY | Age: 70
End: 2020-05-27
Payer: MEDICARE

## 2020-05-27 VITALS
DIASTOLIC BLOOD PRESSURE: 64 MMHG | WEIGHT: 202 LBS | SYSTOLIC BLOOD PRESSURE: 115 MMHG | HEIGHT: 71 IN | HEART RATE: 70 BPM | BODY MASS INDEX: 28.28 KG/M2 | TEMPERATURE: 97 F

## 2020-05-27 PROCEDURE — 99212 OFFICE O/P EST SF 10 MIN: CPT | Performed by: STUDENT IN AN ORGANIZED HEALTH CARE EDUCATION/TRAINING PROGRAM

## 2020-05-27 PROCEDURE — 11721 DEBRIDE NAIL 6 OR MORE: CPT | Performed by: STUDENT IN AN ORGANIZED HEALTH CARE EDUCATION/TRAINING PROGRAM

## 2020-05-27 PROCEDURE — 99213 OFFICE O/P EST LOW 20 MIN: CPT | Performed by: STUDENT IN AN ORGANIZED HEALTH CARE EDUCATION/TRAINING PROGRAM

## 2020-05-27 PROCEDURE — 11056 PARNG/CUTG B9 HYPRKR LES 2-4: CPT | Performed by: STUDENT IN AN ORGANIZED HEALTH CARE EDUCATION/TRAINING PROGRAM

## 2020-05-27 RX ORDER — MULTIVIT WITH MINERALS/LUTEIN
1000 TABLET ORAL DAILY
COMMUNITY

## 2020-05-27 NOTE — PROGRESS NOTES
[facility] 25 [phone] [address] Shakir Pinto  Tel: [phone]   Fax: [phone]    Subjective     CC: Diabetic Foot Exam    HPI:  Jen Rojas is a 68y.o. year old male who presents to clinic today for routine diabetic foot exam. Patient c/o elongated/thickened/painful toenails and calluses which cause discomfort with ambulation. Patient has h/o left partial 2nd digit amputation and right 2nd/3rd digit amputations. He denies cramping in legs at rest or with ambulation. Admits to tinlging numbness intermittently denies burning in feet. HgA1C was 8.5% on 9/4/19. Seen by Dermatology for RLE hyperkeratotic plaque - has been compliant w/ Rx triamcinolone/lac-hydrin per their instructions. Primary care physician is Kierra Farrell MD.    ROS:    Constitutional: Denies nausea, vomiting, fever, chills. Neurologic: see above. Vascular: Denies symptoms of lower extremity claudication. Skin: Denies open wounds. Otherwise negative except as noted in the HPI.      PMH:  Past Medical History:   Diagnosis Date    Diabetic ulcer of toe ([address]     Gangrene ([address]     Hypertension     MRSA (methicillin resistant staph aureus) culture positive 07/01/2016    foot    Type II or unspecified type diabetes mellitus without mention of complication, not stated as uncontrolled        Surgical History:   Past Surgical History:   Procedure Laterality Date    CHEST SURGERY      BULLET NEAR AORTA/ NOT SAFE FOR MRI PER RADIOLOGIST DR. Licea Human    KS AMPUTATION TOE,I-P JT Left 6/14/2018    TOE AMPUTATION PARTIAL LEFT 2ND performed by Chata Ayala DPM at [address] [place] [address] N/A 12/19/2017    DEBRIDMENT AND DELAYED PRIMARY CLOSURE RIGHT FOOT WOUND performed by Mitesh Mckeon DPM at [place] [address] Right 12/16/2017    RIGHT 2ND AND 3RD DIGITS TOE AMPUTATION FOR WET GANGRENE performed by Mitesh Mckeon DPM at [facility] Left 06/14/2018       Social History:  Social History     Tobacco Use    Smoking status: Never Smoker    Smokeless tobacco: Never Used   Substance Use Topics    Alcohol use: No     Alcohol/week: 0.0 standard drinks    Drug use: No       Medications:  Prior to Admission medications    Medication Sig Start Date End Date Taking? Authorizing Provider   vitamin E 1000 units capsule Take 1,000 Units by mouth daily   Yes Historical Provider, MD   triamcinolone (KENALOG) 0.1 % cream Apply to red irritated rash once daily (not face, armpit or groin) 3/12/20  Yes Radha Batres MD   ammonium lactate (LAC-HYDRIN) 12 % lotion Apply twice daily to dry skin 3/12/20  Yes Radha Batres MD   cetirizine (ZYRTEC) 10 MG tablet  12/9/19  Yes Historical Provider, MD   glipiZIDE (GLUCOTROL) 10 MG tablet  12/9/19  Yes Historical Provider, MD   Alcohol Swabs (PRO COMFORT ALCOHOL) 70 % PADS  12/9/19  Yes Historical Provider, MD Jimenez Adali 100 MG tablet  11/27/19  Yes Historical Provider, MD   oxybutynin (DITROPAN-XL) 10 MG extended release tablet  12/2/19  Yes Historical Provider, MD   Omega-3 Fatty Acids (OMEGA 3 500 PO) Take by mouth   Yes Historical Provider, MD   aspirin EC 81 MG EC tablet  9/24/11  Yes Historical Provider, MD   LIPITOR 20 MG tablet  11/4/11  Yes Historical Provider, MD   diltiazem (CARDIZEM CD) 300 MG ER capsule  11/4/11  Yes Historical Provider, MD   docusate sodium (COLACE) 100 MG capsule  11/4/11  Yes Historical Provider, MD   glimepiride (AMARYL) 4 MG tablet  11/4/11  Yes Historical Provider, MD Bharath Long 1213 TEST strip  11/4/11  Yes Historical Provider, MD   lisinopril-hydrochlorothiazide (PRINZIDE;ZESTORETIC) 20-12.5 MG per tablet  9/6/11  Yes Historical Provider, MD   metformin (GLUCOPHAGE) 1000 MG tablet  11/4/11  Yes Historical Provider, MD   povidone-iodine (BETADINE) 10 % external solution Apply topically as needed.  7/6/16 4/10/19  Hermila Soler DPM     Objective     Vitals:    05/27/20 1236   BP: 115/64

## 2020-06-17 ENCOUNTER — OFFICE VISIT (OUTPATIENT)
Dept: PODIATRY | Age: 70
End: 2020-06-17
Payer: MEDICARE

## 2020-06-17 VITALS
HEART RATE: 80 BPM | WEIGHT: 199 LBS | SYSTOLIC BLOOD PRESSURE: 139 MMHG | HEIGHT: 71 IN | BODY MASS INDEX: 27.86 KG/M2 | TEMPERATURE: 98 F | DIASTOLIC BLOOD PRESSURE: 74 MMHG

## 2020-06-17 PROCEDURE — 11055 PARING/CUTG B9 HYPRKER LES 1: CPT | Performed by: STUDENT IN AN ORGANIZED HEALTH CARE EDUCATION/TRAINING PROGRAM

## 2020-06-17 PROCEDURE — 99212 OFFICE O/P EST SF 10 MIN: CPT | Performed by: PODIATRIST

## 2020-06-17 PROCEDURE — 11721 DEBRIDE NAIL 6 OR MORE: CPT | Performed by: STUDENT IN AN ORGANIZED HEALTH CARE EDUCATION/TRAINING PROGRAM

## 2020-06-17 RX ORDER — AMMONIUM LACTATE 12 G/100G
LOTION TOPICAL
Qty: 57 G | Refills: 5 | Status: CANCELLED | OUTPATIENT
Start: 2020-06-17

## 2020-06-17 NOTE — PATIENT INSTRUCTIONS
Patient Education        Diabetes Foot Health: Care Instructions  Your Care Instructions     When you have diabetes, your feet need extra care and attention. Diabetes can damage the nerve endings and blood vessels in your feet, making you less likely to notice when your feet are injured. Diabetes also limits your body's ability to fight infection and get blood to areas that need it. If you get a minor foot injury, it could become an ulcer or a serious infection. With good foot care, you can prevent most of these problems. Caring for your feet can be quick and easy. Most of the care can be done when you are bathing or getting ready for bed. Follow-up care is a key part of your treatment and safety. Be sure to make and go to all appointments, and call your doctor if you are having problems. It's also a good idea to know your test results and keep a list of the medicines you take. How can you care for yourself at home? · Keep your blood sugar close to normal by watching what and how much you eat, monitoring blood sugar, taking medicines if prescribed, and getting regular exercise. · Do not smoke. Smoking affects blood flow and can make foot problems worse. If you need help quitting, talk to your doctor about stop-smoking programs and medicines. These can increase your chances of quitting for good. · Eat a diet that is low in fats. High fat intake can cause fat to build up in your blood vessels and decrease blood flow. · Inspect your feet daily for blisters, cuts, cracks, or sores. If you cannot see well, use a mirror or have someone help you. · Take care of your feet:  ? Wash your feet every day. Use warm (not hot) water. Check the water temperature with your wrists or other part of your body, not your feet. ? Dry your feet well. Pat them dry. Do not rub the skin on your feet too hard. Dry well between your toes.  If the skin on your feet stays moist, bacteria or a fungus can grow, which can lead to

## 2020-06-17 NOTE — PROGRESS NOTES
Used   Substance Use Topics    Alcohol use: No     Alcohol/week: 0.0 standard drinks    Drug use: No       Medications:  Prior to Admission medications    Medication Sig Start Date End Date Taking? Authorizing Provider   vitamin E 1000 units capsule Take 1,000 Units by mouth daily    Historical Provider, MD   triamcinolone (KENALOG) 0.1 % cream Apply to red irritated rash once daily (not face, armpit or groin) 3/12/20   Isela Cheema MD   ammonium lactate (LAC-HYDRIN) 12 % lotion Apply twice daily to dry skin 3/12/20   Lina Jensen MD   cetirizine (ZYRTEC) 10 MG tablet  12/9/19   Historical Provider, MD   glipiZIDE (GLUCOTROL) 10 MG tablet  12/9/19   Historical Provider, MD   Alcohol Swabs (PRO COMFORT ALCOHOL) 70 % PADS  12/9/19   Historical Provider, MD   JANUVIA 100 MG tablet  11/27/19   Historical Provider, MD   oxybutynin (DITROPAN-XL) 10 MG extended release tablet  12/2/19   Historical Provider, MD   Omega-3 Fatty Acids (OMEGA 3 500 PO) Take by mouth    Historical Provider, MD   povidone-iodine (BETADINE) 10 % external solution Apply topically as needed. 7/6/16 4/10/19  Zurdo Casillas DPM   aspirin EC 81 MG EC tablet  9/24/11   Historical Provider, MD   LIPITOR 20 MG tablet  11/4/11   Historical Provider, MD   diltiazem (CARDIZEM CD) 300 MG ER capsule  11/4/11   Historical Provider, MD   docusate sodium (COLACE) 100 MG capsule  11/4/11   Historical Provider, MD   glimepiride (AMARYL) 4 MG tablet  11/4/11   Historical Provider, MD Bharath Long 1213 TEST strip  11/4/11   Historical Provider, MD   lisinopril-hydrochlorothiazide (PRINZIDE;ZESTORETIC) 20-12.5 MG per tablet  9/6/11   Historical Provider, MD   metformin (GLUCOPHAGE) 1000 MG tablet  11/4/11   Historical Provider, MD     Objective     There were no vitals filed for this visit. Lab Results   Component Value Date    LABA1C 8.5 09/04/2019     Physical Exam:  General:  Alert and oriented x3. In no acute distress.      Lower Extremity Physical Exam:

## 2021-06-18 NOTE — PROGRESS NOTES
[address] [place] [address]   Department of Internal Medicine -   Internal Medicine Residency Program  ______________________________________________________________________    Patient: Sage Skinner  YOB: 1950   MRN: [id number]    Acct: [de-identified]     Admit date: 2017  Today's date: 17    Admitting Diagnosis: Diabetic gangrene ([address]    Subjective:     Pt seen and Chart reviewed. Blood sugar in the evening was 278  Received 3 units sliding scale     Fever:-  Temp (24hrs), Av.2 °F (36.8 °C), Min:97.9 °F (36.6 °C), Max:98.7 °F (37.1 °C)    Blood pressure:   Systolic (70MXS), TPN:489 , Min:135 , SWM:048     Diastolic (34UOE), HDD:76, Min:36, Max:60    Urine output in the last 24 hours: In: -   Out: 600 [Urine:600]    Fluids:10ml/hr   Feeding:NPO  Analgesics:none   DVT Prophylaxis: SCD  GI Prophylaxis:none  Glycemic Control:amaryl 2mg QD and sliding scale   Bowel management:having bowel movements   Indwelling catheter:none     Review of systems. Constitutional: Negative for fever  Cardiovascular: Negative for lightheadedness/orthostatic symptoms ,chest pain, dyspnea on exertion, palpitations or loss of consciousness. Respiratory: Negative for cough or wheezing, sputum production, hemoptysis,   Gastrointestinal: Negative for nausea/vomiting, change in bowel habits, abdominal pain  Genitourinary:Negative dysuria, trouble voiding, hematuria.   Neurological: Negative for headache, dizziness,numbness/tingling    Objective:   Vital Sign:  /60   Pulse 68   Temp 98 °F (36.7 °C) (Oral)   Resp 15   Ht 5' 10.87\" (1.8 m)   Wt 195 lb (88.5 kg)   SpO2 100%   BMI 27.30 kg/m²       Physical Exam:  General appearance:   alert, well appearing, and in no distress  Mental Status: alert, oriented to person, place, and time  Neurologic:  alert, oriented, normal speech, no focal findings or movement disorder noted  Lungs:  clear to auscultation, no wheezes, rales or rhonchi, symmetric What Type Of Note Output Would You Prefer (Optional)?: Standard Output Hpi Title: Evaluation of Skin Lesions air entry  Heart[de-identified] normal rate, regular rhythm, normal S1, S2, no murmurs, rubs, clicks or gallops  Abdomen:  soft, nontender, nondistended, no masses or organomegaly  Extremities: postoperative dressing noted to right foot, non-palpable pedal pulses       Medications:   Scheduled Medications   diltiazem  120 mg Oral Daily    insulin lispro  0-12 Units Subcutaneous TID WC    insulin lispro  0-6 Units Subcutaneous Nightly    lisinopril-hydrochlorothiazide  1 tablet Oral Daily    glimepiride  2 mg Oral Daily with breakfast    vancomycin  1,250 mg Intravenous Q12H    aspirin EC  81 mg Oral Daily    atorvastatin  20 mg Oral Daily    sodium chloride flush  10 mL Intravenous 2 times per day       PRN Medications    ammonium lactate  PRN   docusate sodium 100 mg BID PRN   ondansetron 4 mg Q8H PRN   povidone-iodine  PRN   sodium chloride flush 10 mL PRN   acetaminophen 650 mg Q4H PRN   magnesium hydroxide 30 mL Daily PRN   glucose 15 g PRN   dextrose 12.5 g PRN   glucagon (rDNA) 1 mg PRN   dextrose 100 mL/hr PRN       Diagnostic Labs and Imaging    CBC:  Recent Labs      12/16/17   1414  12/17/17   0712  12/18/17   1019   WBC   --   11.0  11.4*   HGB  10.1*  10.2*  11.4*   PLT   --   480*  526*     BMP:   Recent Labs      12/16/17   1414  12/17/17   0712  12/19/17   0456   NA  133*  134*  133*   K  4.2  4.3  4.0   CL  100  99  97*   CO2  22  22  23   BUN  12  14  15   CREATININE  1.14  1.01  1.13   GLUCOSE  175*  144*  268*     Hepatic: No results for input(s): AST, ALT, ALB, BILITOT, ALKPHOS in the last 72 hours. INR: No results for input(s): INR in the last 72 hours. Troponin: No results for input(s): TROPONINI in the last 72 hours.     Assessment and Plan:   Principal Problem:    Diabetic gangrene ([facility] [address]  Active Problems:    Type 2 diabetes mellitus with diabetic peripheral angiopathy and gangrene, without long-term current use of insulin (HCC)    Essential hypertension    Hyponatremia    Microcytic anemia How Severe Are Your Spot(S)?: mild Have Your Spot(S) Been Treated In The Past?: has not been treated

## 2024-03-14 NOTE — PROGRESS NOTES
Patient instructed to remove shoes and socks, instructed to sit in exam chair. Current PCP name is Kvng Isidro and date of last visit 11/2017. Do you have a follow up visit scheduled? Yes 12/2017  Diabetic visit information    Blood pressure (Control is BP <140/90)  BP Readings from Last 3 Encounters:   12/21/17 (!) 139/51   12/19/17 (!) 122/59   12/16/17 (!) 128/52       BP taken with correct size cuff? - Yes   Repeated if > 140/90 Yes      Tobacco use:  Patient  reports that he has never smoked. He has never used smokeless tobacco.  If Smoker - Cessation materials given? - Yes       Diabetic Health Maintenance Items due  Diabetes Management   Topic Date Due    Diabetic retinal exam  07/16/1958    Diabetic microalbuminuria test  12/15/2016       Diabetic retinal exam done in last year? - Yes   If No: remind patient that it is due and they should schedule an exam    Medications  Is patient taking any medications for diabetes? -   Yes  Have blood sugars been controlled? Fasting blood sugars under 120   -   Yes   Random home sugars or today's POCT glucose is under 180 -   Yes   []  If No to the above then patient should schedule appt with PCP. Diabetic Plan    A1C Plan  Lab Results   Component Value Date    LABA1C 8.2 (H) 12/13/2017    LABA1C 8.0 (H) 08/23/2012      []  If A1C over 8 and last result >3 months ago - Order A1C and refer to PCP   []  If last A1C over 6 months ago - Order A1C and refer to PCP for follow up   []  If elevated blood sugars > 180 - refer to PCP for follow up    []  Blood sugar controlled - A1C under 8 and last check was < 6 months      Cholesterol Plan   Lab Results   Component Value Date    LDLCHOLESTEROL 22 12/14/2017      []  If LDL > 100 and last result >3 months ago - order Fasting lipids and refer to PCP for follow up   []  If LDL < 100 and over 1 year ago - Order Fasting lipids and refer to PCP for follow up   [] LDL is controlled.   LDL < 100 and checked within the last Cloth Tape

## 2024-06-18 NOTE — ANESTHESIA POSTPROCEDURE EVALUATION
Department of Anesthesiology  Postprocedure Note    Patient: Filippo Desai  MRN: [id number]  YOB: 1950  Date of evaluation: 12/19/2017  Time:  8:10 PM     Procedure Summary     Date:  12/19/17 Room / Location:  [address] OR    Anesthesia Start:  [facility] Anesthesia Stop:  1938    Procedure:  DEBRIDMENT AND DELAYED PRIMARY CLOSURE RIGHT FOOT WOUND (N/A Foot) Diagnosis:  (ADD-ON)    Surgeon:  Myrna Ron DPM Responsible Provider:  Lauren Bone MD    Anesthesia Type:  TIVA, general, MAC ASA Status:  3          Anesthesia Type: TIVA, general, MAC    Freya Phase I: Freya Score: 10    Freya Phase II: Freya Score: 10    Last vitals: Reviewed and per EMR flowsheets.        Anesthesia Post Evaluation    Patient location during evaluation: PACU  Patient participation: complete - patient participated  Level of consciousness: awake and alert  Pain score: 0  Airway patency: patent  Nausea & Vomiting: no vomiting  Complications: no  Cardiovascular status: hemodynamically stable  Respiratory status: acceptable  Hydration status: stable Your procedure/Surgery is scheduled at [facility]:  [address] [place] [address]  On ___6/20___. Your arrival time is at   __7am__,        however if there are any changes to your procedure time,  you will be called with your new arrival time the day before your procedure.       Please do NOT follow the arrival time on your live well misbah, it is incorrect and doing so may cause your procedure to be cancelled (we are working on the issue).    Free  service is available Monday through Friday starting at 8am until 3:30pm     Please bring your insurance card, 's license and a list of your medications, vitamins and supplements to the hospital, including the last dosage and time taken.    A surgical estimate may be prepared for your upcoming procedure.  If you have questions about the out of pocket amount due on the estimate,  you may contact the Patient Contact Center for assistance at [phone] -option #4 Estimates.     ON DAY OF PROCEDURE, CHECK IN AT:  Day Surgery - located on the 2nd floor. ([phone])    Other Testing needed:    6/18/2024- type and screen    At [facility]:  [address] [place]   [phone]  Monday - Friday: 6am - 6:30pm  Saturday: 6am - 12:30pm    Diet:    follow diet instructions given by Violeta MATOS       Medication Instructions:    follow medication instructions given by Violeta MATOS    If there are any changes in your physical condition, such as cold, fever or infection, or you have specific questions regarding your procedure/surgery, please contact your surgeon directly.    For questions regarding these instructions, contact Pre-Admission Testing at [phone], Monday through Friday, 8 am - 4 pm.    On the day of your procedure, please bring in a copy of your complete up-to-date medication list, I.D., and insurance card.      Important Information:    HIBICLENS REQUIRED    Please bathe or shower the evening before and morning  of your procedure/surgery.  Avoid using lotions, creams, powders, make-up and deodorant on your clean skin.    Remove all body piercings, jewelry, earrings and contact lenses before coming to the hospital.    You may wear you glasses, hearing aids and/or dentures to the hospital. Please bring a case as they will need to be removed prior to the procedure/surgery.    If your doctor mentioned that you might spend the night, please bring a small overnight bag with toiletries and any personal items you may need.  If you use a CPAP machine and will be spending the night, bring your machine, tubing and mask.    Due to anesthesia, you will not be able to operate power tools, drink alcohol, or drive a vehicle for 24 hours after surgery.  You will not be able to walk home, use public transportation or take a cab, Uber or Lyft home.        *If you are not being admitted after your procedure, you must have a responsible adult (18 or older) to drive you home and it is HIGHLY recommended that you have a responsible adult with you overnight at home following the procedure/surgery.*. IF YOU ARE UNABLE TO HAVE SOMEONE WITH YOU OVERNIGHT AFTER SURGERY, PLEASE NOTIFY YOUR SURGEON.     REQUIRED INSTRUCTIONS PRESURGERY SHOWER/BATH   DO NOT: shave any part of the surgical area for at least 2 days prior to surgery.   Tiny skin cuts and abrasions from shaving in the surgical area can increase risk of infection.   DO NOT use lotions on skin before surgery.   DO use a fresh clean towel after each shower.   DO dress in clean clothes after each of your showers.   DO shower or bathe your whole body, including hair, on the night before surgery.   DO shower again on the morning of surgery if time permits (do not wash hair during this shower)     GENERAL REQUIRED SHOWER/BATH INSTRUCTIONS (SOAP AND WATER)   1. Use warm, but not hot, water for shower/bath       NOTE: Showers are recommended. Showers are more effective than a bath for pre-operative  skin cleaning   2. Use regular soap to remove dirt and germs from your skin   3. Do not scrub so vigorously that skin is abraded or scratched   4. Use clean towels to gently dry your skin after each shower/bath          Instructions for Preoperative Skin Cleansing Before Planned Surgical Procedure at [facility]   For your safety and to help prevent infection, please follow this:    REQUIRED additional antiseptic shower/bath.   ANTISEPTIC SHOWER/BATH PROCESS     e.g. HIBICLENS* or alternative chlorhexidine (CHG) containing skin antisepsis solutions   Chlorhexidine gluconate (2% - 4%) skin cleansing solutions are wash solutions used to kill germs on the skin. They are available over the counter (no prescription needed) at minimal cost at most pharmacies and drug stores.   *WHEN USING HIBICLENS* MAY CAUSE SHOWER AND BATHTUB SURFACES TO BECOME SLIPPERY*   Instructions for \"whole body\" shower or bath with chlorhexidine gluconate (CHG) antiseptic solution   1. After your required shower or bath, rinse thoroughly.   2. Step away from the stream of water, and thoroughly apply enough solution to cover skin below the neck (not head or face), within skin folds, and in hard to reach areas (e.g. the back of the knees, inside elbows, etc). Use your hands to apply and rub onto your skin without using a washcloth. Note: Solution will NOT suds up when applying   3. Rinse thoroughly.   4. Dry off with a clean towel.   5. Dress in clean clothes.   ? DO NOT use on the head or face DO NOT use in the genital area   ? DO NOT use on non-healing wounds DO NOT use in eyes, ears or mouth.   Making sure that your skin is clean and ready for surgery at home is very important.   You can reduce the risk of infection by following the required instructions above.       email

## (undated) DEVICE — SKIN PREP TRAY W/CHG: Brand: MEDLINE INDUSTRIES, INC.

## (undated) DEVICE — SVMMC POD PK

## (undated) DEVICE — YANKAUER,FLEXIBLE HANDLE,REGLR CAPACITY: Brand: MEDLINE INDUSTRIES, INC.

## (undated) DEVICE — CONTAINER,SPECIMEN,OR STERILE,4OZ: Brand: MEDLINE

## (undated) DEVICE — TUBING, SUCTION, 1/4" X 12', STRAIGHT: Brand: MEDLINE

## (undated) DEVICE — ZIMMER® STERILE DISPOSABLE TOURNIQUET CUFF WITH PLC, DUAL PORT, SINGLE BLADDER, 18 IN. (46 CM)

## (undated) DEVICE — GOWN,AURORA,NONREINFORCED,LARGE: Brand: MEDLINE

## (undated) DEVICE — ELECTRODE PT RET AD L9FT HI MOIST COND ADH HYDRGEL CORDED

## (undated) DEVICE — DRAPE,REIN 53X77,STERILE: Brand: MEDLINE

## (undated) DEVICE — DUP USE 394429 BLADE SAW SAG OSCIL MED NAR 5.5MM

## (undated) DEVICE — GLOVE SURG SZ 75 L12IN FNGR THK87MIL WHT LTX FREE

## (undated) DEVICE — Z CONVERTED USE 2162213 APPLICATOR PREP 4OZ CHG 4% BACTOSHIELD USE FOR HND WSH AND

## (undated) DEVICE — SOLUTION IV IRRIG POUR BRL 0.9% SODIUM CHL 2F7124

## (undated) DEVICE — ZIMMER® STERILE DISPOSABLE TOURNIQUET CUFF WITH PROTECTIVE SLEEVE AND PLC, DUAL PORT, SINGLE BLADDER, 18 IN. (46 CM)

## (undated) DEVICE — CHLORAPREP 26ML ORANGE

## (undated) DEVICE — BLADE SAW W7XL18.5MM THK0.51MM SAG OSC TPS 229633114] STRYKER INSTRUMENT DIV]

## (undated) DEVICE — STRIP WND PK W0.25INXL5YD IODO GZ TIGHTLY WVN CURAD

## (undated) DEVICE — STANDARD HYPODERMIC NEEDLE,POLYPROPYLENE HUB: Brand: MONOJECT

## (undated) DEVICE — STOCKINETTE ORTH UNIV W4INXL25YD COT W DISPNS BX PROTOUCH

## (undated) DEVICE — NEEDLE FLTR 18GA L1.5IN MEM THK5UM BLNT DISP

## (undated) DEVICE — COVER XR CASS W24XL25IN CLR POLY FLM DRAPEABLE W REL LNR

## (undated) DEVICE — INTENDED FOR TISSUE SEPARATION, AND OTHER PROCEDURES THAT REQUIRE A SHARP SURGICAL BLADE TO PUNCTURE OR CUT.: Brand: BARD-PARKER ® CARBON RIB-BACK BLADES

## (undated) DEVICE — Device

## (undated) DEVICE — SYRINGE, LUER LOCK, 10ML: Brand: MEDLINE

## (undated) DEVICE — 3M™ WARMING BLANKET, UPPER BODY, 10 PER CASE, 42268: Brand: BAIR HUGGER™

## (undated) DEVICE — Z INACTIVE USE 2660664 SOLUTION IRRIG 3000ML 0.9% SOD CHL USP UROMATIC PLAS CONT

## (undated) DEVICE — PADDING UNDERCAST W4INXL4YD COT FBR LO LINTING WYTEX

## (undated) DEVICE — GLOVE ORANGE PI 7   MSG9070

## (undated) DEVICE — TUBE IRRIG HNDPC HI FLO TP INTRPULS W/SUCTION TUBE

## (undated) DEVICE — SUTURE COAT VCRL SZ 4-0 L18IN ABSRB UD L19MM PS-2 1/2 CIR J496G

## (undated) DEVICE — GLOVE ORANGE PI 8 1/2   MSG9085

## (undated) DEVICE — SWAB CULT CLR BLU PLAS RAYON LIQ AMIES AERB ANAERB FRIC CAP

## (undated) DEVICE — Z DISCONTINUED BY MEDLINE USE 2711682 TRAY SKIN PREP DRY W/ PREM GLV

## (undated) DEVICE — Z DISCONTINUED USE 2275686 GLOVE SURG SZ 8 L12IN FNGR THK13MIL WHT ISOLEX POLYISOPRENE

## (undated) DEVICE — GLOVE SURG SZ 65 L12IN FNGR THK87MIL WHT LTX FREE

## (undated) DEVICE — BANDAGE,GAUZE,BULKEE II,4.5"X4.1YD,STRL: Brand: MEDLINE

## (undated) DEVICE — CULTURETTE AERO/ANEROBIC RED/BLUE BUNDLE

## (undated) DEVICE — SUTURE VCRL SZ 2-0 L27IN ABSRB UD L26MM SH 1/2 CIR J417H

## (undated) DEVICE — PREP SOL PVP IODINE 4%  4 OZ/BTL

## (undated) DEVICE — GOWN,SIRUS,NONRNF,SETINSLV,XL,20/CS: Brand: MEDLINE

## (undated) DEVICE — 1016 S-DRAPE IRRIG POUCH 10/BOX: Brand: STERI-DRAPE™

## (undated) DEVICE — BNDG,ELSTC,MATRIX,STRL,6"X5YD,LF,HOOK&LP: Brand: MEDLINE

## (undated) DEVICE — GLOVE ORANGE PI 7 1/2   MSG9075

## (undated) DEVICE — GLOVE ORANGE PI 8   MSG9080

## (undated) DEVICE — GAUZE,SPONGE,FLUFF,6"X6.75",STRL,5/TRAY: Brand: MEDLINE

## (undated) DEVICE — SUTURE ETHLN SZ 4-0 L18IN NONABSORBABLE BLK L19MM PS-2 3/8 1667H

## (undated) DEVICE — SUTURE NONABSORBABLE MONOFILAMENT 3-0 PS-1 18 IN BLK ETHILON 1663H